# Patient Record
Sex: FEMALE | Race: BLACK OR AFRICAN AMERICAN | Employment: FULL TIME | RURAL
[De-identification: names, ages, dates, MRNs, and addresses within clinical notes are randomized per-mention and may not be internally consistent; named-entity substitution may affect disease eponyms.]

---

## 2017-01-11 ENCOUNTER — OFFICE VISIT (OUTPATIENT)
Dept: FAMILY MEDICINE CLINIC | Age: 37
End: 2017-01-11

## 2017-01-11 VITALS
BODY MASS INDEX: 21.66 KG/M2 | HEART RATE: 109 BPM | HEIGHT: 66 IN | RESPIRATION RATE: 16 BRPM | DIASTOLIC BLOOD PRESSURE: 84 MMHG | OXYGEN SATURATION: 96 % | WEIGHT: 134.8 LBS | TEMPERATURE: 98.4 F | SYSTOLIC BLOOD PRESSURE: 116 MMHG

## 2017-01-11 DIAGNOSIS — E11.69 TYPE 2 DIABETES MELLITUS WITH OTHER SPECIFIED COMPLICATION, WITH LONG-TERM CURRENT USE OF INSULIN (HCC): Primary | ICD-10-CM

## 2017-01-11 DIAGNOSIS — F39 MOOD DISORDER (HCC): ICD-10-CM

## 2017-01-11 DIAGNOSIS — I47.1 PSVT (PAROXYSMAL SUPRAVENTRICULAR TACHYCARDIA) (HCC): ICD-10-CM

## 2017-01-11 DIAGNOSIS — Z79.4 TYPE 2 DIABETES MELLITUS WITH OTHER SPECIFIED COMPLICATION, WITH LONG-TERM CURRENT USE OF INSULIN (HCC): Primary | ICD-10-CM

## 2017-01-11 DIAGNOSIS — Z72.0 TOBACCO ABUSE: ICD-10-CM

## 2017-01-11 RX ORDER — METOPROLOL SUCCINATE 25 MG/1
25 TABLET, EXTENDED RELEASE ORAL DAILY
Qty: 30 TAB | Refills: 2 | Status: ON HOLD | OUTPATIENT
Start: 2017-01-11 | End: 2020-11-16 | Stop reason: SDUPTHER

## 2017-01-11 RX ORDER — INSULIN GLARGINE 100 [IU]/ML
15 INJECTION, SOLUTION SUBCUTANEOUS DAILY
Qty: 3 VIAL | Refills: 2 | Status: SHIPPED | OUTPATIENT
Start: 2017-01-11 | End: 2021-03-09 | Stop reason: ALTCHOICE

## 2017-01-11 RX ORDER — CLONAZEPAM 1 MG/1
1 TABLET ORAL 3 TIMES DAILY
Qty: 45 TAB | Refills: 0 | Status: SHIPPED | OUTPATIENT
Start: 2017-01-11 | End: 2017-01-26

## 2017-01-11 RX ORDER — QUETIAPINE FUMARATE 50 MG/1
50 TABLET, FILM COATED ORAL 2 TIMES DAILY
Qty: 60 TAB | Refills: 2 | Status: SHIPPED | OUTPATIENT
Start: 2017-01-11 | End: 2017-01-19 | Stop reason: SDUPTHER

## 2017-01-11 NOTE — PROGRESS NOTES
Chief Complaint   Patient presents with    Medication Refill     Body mass index is 21.76 kg/(m^2). Reviewed record in preparation for visit and have necessary documentation  Pt did not bring medication to office visit for review  Information was given to pt on Advanced Directives, Living Will  Information was given on Shingles Vaccine  Opportunity was given for questions  Goals that were addressed and/or need to be completed after this appointment include:     Health Maintenance Due   Topic Date Due    Pneumococcal 19-64 Medium Risk (1 of 1 - PPSV23) 07/26/1999    DTaP/Tdap/Td series (1 - Tdap) 07/26/2001    PAP AKA CERVICAL CYTOLOGY  07/26/2001    EYE EXAM RETINAL OR DILATED Q1  06/03/2014     - check for functional glucose monitor and record keeping system: Yes, checks sugar three times daily  Pt was given BS record log to document home readings and return to office for review  Diabetic Bundle:  LDL-76  A1C-11.6  BP-116/84  Smoking? Yes  Anticoagulation medication? No   Eye exam dilated?  Due  Foot exam? Good

## 2017-01-11 NOTE — MR AVS SNAPSHOT
Visit Information Date & Time Provider Department Dept. Phone Encounter #  
 1/11/2017  2:40 PM Alvina Lorenzana MD [address] [id number] Follow-up Instructions Return in about 2 weeks (around 1/25/2017). Upcoming Health Maintenance Date Due Pneumococcal 19-64 Medium Risk (1 of 1 - PPSV23) 7/26/1999 DTaP/Tdap/Td series (1 - Tdap) 7/26/2001 PAP AKA CERVICAL CYTOLOGY 7/26/2001 EYE EXAM RETINAL OR DILATED Q1 6/3/2014 HEMOGLOBIN A1C Q6M 5/7/2017 FOOT EXAM Q1 11/7/2017 MICROALBUMIN Q1 11/7/2017 LIPID PANEL Q1 11/7/2017 Allergies as of 1/11/2017  Review Complete On: 1/11/2017 By: Aliyah Cadet LPN No Known Allergies Current Immunizations  Never Reviewed Name Date Influenza Vaccine (Quad) PF 11/7/2016 Influenza Vaccine PF 1/15/2014 Not reviewed this visit You Were Diagnosed With   
  
 Codes Comments Type 2 diabetes mellitus with other specified complication, with long-term current use of insulin (HCC)    -  Primary ICD-10-CM: E11.69, Z79.4 PSVT (paroxysmal supraventricular tachycardia) (HCC)     ICD-10-CM: I47.1 ICD-9-CM: 427.0 [facility] disorder ([facility]     ICD-10-CM: F39 
ICD-9-CM: 296.90 Vitals BP Pulse Temp Resp Height(growth percentile) Weight(growth percentile) 116/84 (BP 1 Location: Right arm, BP Patient Position: Sitting) (!) 109 98.4 °F (36.9 °C) (Oral) 16 5' 6\" (1.676 m) 134 lb 12.8 oz (61.1 kg) LMP SpO2 BMI OB Status Smoking Status 12/10/2012 96% 21.76 kg/m2 Hysterectomy Current Every Day Smoker Vitals History BMI and BSA Data Body Mass Index Body Surface Area 21.76 kg/m 2 1.69 m 2 Preferred Pharmacy Pharmacy Name Phone [address] [place], [place] - [address] [phone] Your Updated Medication List  
  
   
This list is accurate as of: 1/11/17  3:00 PM.  Always use your most recent med list.  
  
  
  
  
 Blood-Glucose Meter monitoring kit Commonly known as:  [address]  
by Does Not Apply route. Use once daily at different times of day  
  
 citalopram 10 mg tablet Commonly known as:  Alireza Gola Take 1 Tab by mouth daily. clonazePAM 1 mg tablet Commonly known as:  Alexa Sables Take 1 Tab by mouth three (3) times daily for 15 days. Max Daily Amount: 3 mg. Indications: SARA ASSOCIATED WITH BIPOLAR DISORDER, ADJUNCT FLAGYL 500 mg tablet Generic drug:  metroNIDAZOLE Take 500 mg by mouth two (2) times a day. glucose blood VI test strips strip Commonly known as:  ASCENSIA AUTODISC VI, ONE TOUCH ULTRA TEST VI  
by Does Not Apply route two (2) times a day. Bid  
  
 insulin glargine 100 unit/mL injection Commonly known as:  LANTUS  
15 Units by SubCUTAneous route daily. Lancets Misc E11.9 testing once a day  
  
 linaclotide 145 mcg Cap capsule Commonly known as:  Weaver Peterson Take 1 Cap by mouth daily. lisinopril-hydroCHLOROthiazide 20-25 mg per tablet Commonly known as:  Peterson Spark Take  by mouth daily. metFORMIN 1,000 mg tablet Commonly known as:  GLUCOPHAGE Take 1,000 mg by mouth two (2) times daily (with meals). metoprolol succinate 25 mg XL tablet Commonly known as:  TOPROL-XL Take 1 Tab by mouth daily. Indications: PAROXYSMAL SUPRAVENTRICULAR TACHYCARDIA QUEtiapine 50 mg tablet Commonly known as:  SEROquel Take 1 Tab by mouth two (2) times a day for 30 days. Indications: Sara associated with Bipolar Disorder Prescriptions Printed Refills  
 clonazePAM (KLONOPIN) 1 mg tablet 0 Sig: Take 1 Tab by mouth three (3) times daily for 15 days. Max Daily Amount: 3 mg. Indications: SARA ASSOCIATED WITH BIPOLAR DISORDER, ADJUNCT Class: Print Route: Oral  
  
Prescriptions Sent to Pharmacy  Refills  
 insulin glargine (LANTUS) 100 unit/mL injection 2  
 Sig: 15 Units by SubCUTAneous route daily. Class: Normal  
 Pharmacy: [address] [place] [address] #: [phone] Route: SubCUTAneous  
 metoprolol succinate (TOPROL-XL) 25 mg XL tablet 2 Sig: Take 1 Tab by mouth daily. Indications: PAROXYSMAL SUPRAVENTRICULAR TACHYCARDIA Class: Normal  
 Pharmacy: [address] [place] [address] #: [phone] Route: Oral  
 QUEtiapine (SEROQUEL) 50 mg tablet 2 Sig: Take 1 Tab by mouth two (2) times a day for 30 days. Indications: Jolene associated with Bipolar Disorder Class: Normal  
 Pharmacy: [address] [place] [address] #: [phone] Route: Oral  
  
We Performed the Following REFERRAL TO OPHTHALMOLOGY [REF57 Custom] Comments:  
 Patient with DM2 in need of eye exam.. Follow-up Instructions Return in about 2 weeks (around 1/25/2017). Referral Information Referral ID Referred By Referred To  
  
 [id number] Seema FLORES Not Available Visits Status Start Date End Date 1 New Request 1/11/17 1/11/18 If your referral has a status of pending review or denied, additional information will be sent to support the outcome of this decision. Introducing [facility]! Romayne Duster introduces InfoScout patient portal. Now you can access parts of your medical record, email your doctor's office, and request medication refills online. 1. In your internet browser, go to [email or web address] 2. Click on the First Time User? Click Here link in the Sign In box. You will see the New Member Sign Up page. 3. Enter your InfoScout Access Code exactly as it appears below. You will not need to use this code after youve completed the sign-up process. If you do not sign up before the expiration date, you must request a new code. · Info[employer]out Access Code: [email or web address] Expires: 2/5/2017 10:20 AM 
 
 4. Enter the last four digits of your Social Security Number (xxxx) and Date of Birth (mm/dd/yyyy) as indicated and click Submit. You will be taken to the next sign-up page. 5. Create a Joox ID. This will be your Joox login ID and cannot be changed, so think of one that is secure and easy to remember. 6. Create a Joox password. You can change your password at any time. 7. Enter your Password Reset Question and Answer. This can be used at a later time if you forget your password. 8. Enter your e-mail address. You will receive e-mail notification when new information is available in [address] 9. Click Sign Up. You can now view and download portions of your medical record. 10. Click the Download Summary menu link to download a portable copy of your medical information. If you have questions, please visit the Frequently Asked Questions section of the Joox website. Remember, Joox is NOT to be used for urgent needs. For medical emergencies, dial 911. Now available from your iPhone and Android! Please provide this summary of care documentation to your next provider. Your primary care clinician is listed as [address] [place] [address]. If you have any questions after today's visit, please call [phone].

## 2017-01-16 NOTE — PROGRESS NOTES
Patient: Miguel Ellison MRN: [id number]  SSN: [id number]    YOB: 1980  Age: 39 y.o. Sex: female        Subjective:     Chief Complaint   Patient presents with    Medication Refill       HPI: she is a 39y.o. year old female who presents for follow up. Patient with hx of uncontrolled DM2, HTN, HLD, and tobacco abuse. Patient with recent drug screen positive for opioid and cannabis. She is requesting refill of clonazepam prescribed by another provider for anxiety. Patient denies F/C, HA, dizziness, SOB, CP, abdominal pain, dysuria, myalgias or arthralgias. Importance of compliance with all prescribed medications, diabetic diet and follow up discussed. Patient sans other complaints or concerns at this time. Explained that she needed follow up with prescribing provider as I would not write current amounts of clonazepam except for today. Particularly given patient's hx of drug abuse. BP Readings from Last 3 Encounters:   01/11/17 116/84   12/27/16 128/83   11/29/16 136/82     Wt Readings from Last 3 Encounters:   01/11/17 134 lb 12.8 oz (61.1 kg)   12/27/16 126 lb (57.2 kg)   11/29/16 123 lb (55.8 kg)     Body mass index is 21.76 kg/(m^2).       Lab Results   Component Value Date/Time    WBC 5.8 11/07/2016 12:22 PM    Hemoglobin (POC) 12.5 10/04/2013 04:33 PM    HGB 15.0 11/07/2016 12:22 PM    HCT 45.3 11/07/2016 12:22 PM    PLATELET 368 27/88/7482 12:22 PM    MCV 88 11/07/2016 12:22 PM       Lab Results   Component Value Date/Time    Cholesterol, total 143 11/07/2016 12:22 PM    HDL Cholesterol 45 11/07/2016 12:22 PM    LDL, calculated 76 11/07/2016 12:22 PM    Triglyceride 108 11/07/2016 12:22 PM       Lab Results   Component Value Date/Time    Sodium 132 11/07/2016 12:22 PM    Potassium 4.1 11/07/2016 12:22 PM    Chloride 88 11/07/2016 12:22 PM    CO2 24 11/07/2016 12:22 PM    Glucose 226 11/07/2016 12:22 PM    BUN 11 11/07/2016 12:22 PM    Creatinine 0.65 11/07/2016 12:22 PM    BUN/Creatinine ratio 17 11/07/2016 12:22 PM    GFR est  11/07/2016 12:22 PM    GFR est non- 11/07/2016 12:22 PM    Calcium 10.3 11/07/2016 12:22 PM    Bilirubin, total 0.3 11/07/2016 12:22 PM    ALT 11 11/07/2016 12:22 PM    AST 15 11/07/2016 12:22 PM    Alk. phosphatase 87 11/07/2016 12:22 PM    Protein, total 7.7 11/07/2016 12:22 PM    Albumin 4.6 11/07/2016 12:22 PM    A-G Ratio 1.5 11/07/2016 12:22 PM      Lab Results   Component Value Date/Time    Hemoglobin A1c 11.6 11/07/2016 12:22 PM    Hemoglobin A1c (POC) 5.5 08/07/2013 01:47 PM          Encounter Diagnoses   Name Primary?  Type 2 diabetes mellitus with other specified complication, with long-term current use of insulin (HCC) Yes    PSVT ([facility]oxysmal supraventricular tachycardia) [facility]     Mood disorder ([facility])     Tobacco abuse        Current and past medical information:    Current Medications after this visit[de-identified]     Current Outpatient Prescriptions   Medication Sig    insulin glargine (LANTUS) 100 unit/mL injection 15 Units by SubCUTAneous route daily.  metoprolol succinate (TOPROL-XL) 25 mg XL tablet Take 1 Tab by mouth daily. Indications: PAROXYSMAL SUPRAVENTRICULAR TACHYCARDIA    QUEtiapine (SEROQUEL) 50 mg tablet Take 1 Tab by mouth two (2) times a day for 30 days. Indications: Sara associated with Bipolar Disorder    clonazePAM (KLONOPIN) 1 mg tablet Take 1 Tab by mouth three (3) times daily for 15 days. Max Daily Amount: 3 mg. Indications: SARA ASSOCIATED WITH BIPOLAR DISORDER, ADJUNCT    glucose blood VI test strips (ASCENSIA AUTODISC VI, ONE TOUCH ULTRA TEST VI) strip by Does Not Apply route two (2) times a day. Bid    lisinopril-hydroCHLOROthiazide (PRINZIDE, ZESTORETIC) 20-25 mg per tablet Take  by mouth daily.  linaclotide (LINZESS) 145 mcg cap capsule Take 1 Cap by mouth daily.  metFORMIN (GLUCOPHAGE) 1,000 mg tablet Take 1,000 mg by mouth two (2) times daily (with meals).     Lancets misc E11.9 testing once a day    citalopram (CELEXA) 10 mg tablet Take 1 Tab by mouth daily.  Blood-Glucose Meter (ONE TOUCH [employer] SYSTEM) monitoring kit by Does Not Apply route. Use once daily at different times of day    metroNIDAZOLE (FLAGYL) 500 mg tablet Take 500 mg by mouth two (2) times a day. No current facility-administered medications for this visit.         Patient Active Problem List    Diagnosis Date Noted    H/O medication noncompliance 11/23/2016    Toenail fungus 08/07/2013    Emotional lability 05/31/2013    Hyperlipidemia 04/29/2013    Anxiety 04/29/2013    Anemia 03/18/2011    Tobacco abuse 06/21/2010    HTN (hypertension), benign 05/19/2010    Diabetes mellitus ([facility] 05/19/2010    Cholesterol serum increased 05/19/2010    Menorrhagia, premenopausal 05/19/2010       Past Medical History   Diagnosis Date    Cholesterol serum increased 5/19/2010    Diabetes mellitus ([facility] 5/19/2010    HTN (hypertension), benign 5/19/2010       No Known Allergies    Past Surgical History   Procedure Laterality Date    Hx gyn       s/p Hysterectomy       Social History     Social History    Marital status: SINGLE     Spouse name: N/A    Number of children: N/A    Years of education: N/A     Social History Main Topics    Smoking status: Current Every Day Smoker     Packs/day: 1.00    Smokeless tobacco: Never Used    Alcohol use No    Drug use: No    Sexual activity: Yes     Other Topics Concern    None     Social History Narrative         Objective:     Review of Systems:  Constitutional: Negative for fatigue or malaise  Derm: Negative for rash or lesion  HEENT: Negative for acute hearing or vision changes  Cardiovascular: Negative for dizziness, chest pain or palpitations  Respiratory: Negative for cough, wheezing or SOB  Gastreintestinal: Negative for nausea or abdominal pain  Genital/urinary: Negative for dysuria or voiding dysfunction  Muscoloskeletal: Negative for acute myalgias or arthralgias   Neurological: Negative for headache, weakness or paresthesia  Psychological: Negative for depression or anxiety      Vitals:    01/11/17 1438   BP: 116/84   Pulse: (!) 109   Resp: 16   Temp: 98.4 °F (36.9 °C)   TempSrc: Oral   SpO2: 96%   Weight: 134 lb 12.8 oz (61.1 kg)   Height: 5' 6\" (1.676 m)      Body mass index is 21.76 kg/(m^2). Physical Exam:  Constitutional: well developed, well nourished, in no acute distress  Skin: warm and dry, normal tone and turgor  Head: normocephalic, atraumatic  Eyes: sclera clear, EOMI, PERRL  Neck: normal range of motion  Cardiovascular: normal S1, S2, regular rate and rhythm  Respiratory: clear to auscultation bilaterally with symmetrical effort  Abdomen: soft, BS normal  Extremities: full range of motion  Neurology: normal strength and sensation  Psych: active, alert and oriented, affect appropriate       Health Maintenance Due   Topic Date Due    Pneumococcal 19-64 Medium Risk (1 of 1 - PPSV23) 07/26/1999    DTaP/Tdap/Td series (1 - Tdap) 07/26/2001    PAP AKA CERVICAL CYTOLOGY  07/26/2001    EYE EXAM RETINAL OR DILATED Q1  06/03/2014       Risk, benefits and potential costs of recommended health maintenance discussed. Patient expressed understanding and deferred at this time. Assessment and orders:       ICD-10-CM ICD-9-CM    1. Type 2 diabetes mellitus with other specified complication, with long-term current use of insulin ([facility]) E11.69  insulin glargine (LANTUS) 100 unit/mL injection    Z79.4  REFERRAL TO OPHTHALMOLOGY   2. PSVT (paroxysmal supraventricular tachycardia) ([facility]) I47.1 427.0 metoprolol succinate (TOPROL-XL) 25 mg XL tablet   3. Mood disorder ([facility]) F39 296.90 QUEtiapine (SEROQUEL) 50 mg tablet      clonazePAM (KLONOPIN) 1 mg tablet   4. Tobacco abuse Z72.0 305.1          Plan of care:    Available hospital records reviewed. Diagnoses were discussed in detail with patient. Medication risks/benefits/side effects discussed with patient.    All of the patient's questions were addressed. The patient understands and agrees with our plan of care. The patient knows to call back if they are unsure of or forgets any changes we discussed today or if the symptoms change. The patient received an After-Visit Summary which contains VS, diagnoses, orders, allergy and medication lists. Patient Care Team:  Gio De Los Santos NP as PCP - General (Nurse Practitioner)  Veronica Petty MD (Ophthalmology)    Follow-up Disposition:  Return in about 2 weeks (around 1/25/2017).     Future Appointments  Date Time Provider Lucila De Oliveira   1/25/2017 1:25 PM Bee Cortes NP [facility] SCHED       Signed By: Reynaldo Wade MD     January 15, 2017

## 2017-01-18 ENCOUNTER — PATIENT OUTREACH (OUTPATIENT)
Dept: FAMILY MEDICINE CLINIC | Age: 37
End: 2017-01-18

## 2017-01-18 NOTE — PROGRESS NOTES
[address] 11/24-11/26/2016 DKA, Pseudohyponatremia, Acute kidney injury, Trichomonas    This episode closed. Patient attended follow up appointment and had no readmissions during 30 day PALMER.

## 2017-01-19 ENCOUNTER — TELEPHONE (OUTPATIENT)
Dept: FAMILY MEDICINE CLINIC | Age: 37
End: 2017-01-19

## 2017-01-19 DIAGNOSIS — F39 MOOD DISORDER (HCC): ICD-10-CM

## 2017-01-19 DIAGNOSIS — R45.86 EMOTIONAL LABILITY: ICD-10-CM

## 2017-01-19 DIAGNOSIS — G47.00 INSOMNIA, UNSPECIFIED TYPE: ICD-10-CM

## 2017-01-19 RX ORDER — QUETIAPINE FUMARATE 50 MG/1
50 TABLET, FILM COATED ORAL 2 TIMES DAILY
Qty: 15 TAB | Refills: 0 | Status: SHIPPED | OUTPATIENT
Start: 2017-01-19 | End: 2017-01-25 | Stop reason: ALTCHOICE

## 2017-01-19 NOTE — TELEPHONE ENCOUNTER
Pt is calling back about her anxiety. It is really high. She took the medication as described by Bee. But it did not last as long as suppose to. She does not believe she can go another 8 hours with the anxiety as high as it is. She had some kind of \"blow up\" last night. Her next appointment is scheduled for Wednesday, January 25, 2017 01:25 PM. Per Dr. Gonzalo Bartlett: He is sending medication to last her to her next appointment only. She is to take as directed.

## 2017-01-19 NOTE — TELEPHONE ENCOUNTER
Patient called about her prescription for Seroquel. [facility] is stating that it is a week to early to fill this prescription. Patient states that she is out and wants to know what to do about this. [facility] stated that the dose is the same as what has been prescribed previously.   Please contact the patient to discuss ASAP at [phone]

## 2017-01-19 NOTE — TELEPHONE ENCOUNTER
Call placed to [facility] regarding filling seroquel early.  Per Dr. Sarah Valentin refill sent for 15 tablets to last patient until next appointment

## 2017-01-23 DIAGNOSIS — F39 MOOD DISORDER (HCC): ICD-10-CM

## 2017-01-23 RX ORDER — CLONAZEPAM 1 MG/1
1 TABLET ORAL 3 TIMES DAILY
Refills: 0 | OUTPATIENT
Start: 2017-01-23 | End: 2017-02-07

## 2017-01-24 NOTE — TELEPHONE ENCOUNTER
Patient request for refill of seroquel. Using more than prescribed dosing and has run out early. Has follow up on 1/25/17.

## 2017-01-25 ENCOUNTER — OFFICE VISIT (OUTPATIENT)
Dept: FAMILY MEDICINE CLINIC | Age: 37
End: 2017-01-25

## 2017-01-25 VITALS
WEIGHT: 131 LBS | DIASTOLIC BLOOD PRESSURE: 102 MMHG | OXYGEN SATURATION: 99 % | BODY MASS INDEX: 21.05 KG/M2 | SYSTOLIC BLOOD PRESSURE: 149 MMHG | RESPIRATION RATE: 16 BRPM | HEIGHT: 66 IN | HEART RATE: 103 BPM | TEMPERATURE: 98.1 F

## 2017-01-25 DIAGNOSIS — E55.9 VITAMIN D DEFICIENCY: ICD-10-CM

## 2017-01-25 DIAGNOSIS — G47.00 INSOMNIA, UNSPECIFIED TYPE: ICD-10-CM

## 2017-01-25 DIAGNOSIS — I47.1 PSVT (PAROXYSMAL SUPRAVENTRICULAR TACHYCARDIA) (HCC): ICD-10-CM

## 2017-01-25 DIAGNOSIS — Z71.3 DIETARY COUNSELING AND SURVEILLANCE: ICD-10-CM

## 2017-01-25 DIAGNOSIS — Z79.4 TYPE 2 DIABETES MELLITUS WITH OTHER SPECIFIED COMPLICATION, WITH LONG-TERM CURRENT USE OF INSULIN (HCC): ICD-10-CM

## 2017-01-25 DIAGNOSIS — Z71.82 EXERCISE COUNSELING: ICD-10-CM

## 2017-01-25 DIAGNOSIS — E78.1 PURE HYPERGLYCERIDEMIA: ICD-10-CM

## 2017-01-25 DIAGNOSIS — F39 MOOD DISORDER (HCC): Primary | ICD-10-CM

## 2017-01-25 DIAGNOSIS — Z72.0 TOBACCO ABUSE: ICD-10-CM

## 2017-01-25 DIAGNOSIS — E11.69 TYPE 2 DIABETES MELLITUS WITH OTHER SPECIFIED COMPLICATION, WITH LONG-TERM CURRENT USE OF INSULIN (HCC): ICD-10-CM

## 2017-01-25 RX ORDER — TRAZODONE HYDROCHLORIDE 100 MG/1
100 TABLET ORAL
Qty: 30 TAB | Refills: 0 | Status: SHIPPED | OUTPATIENT
Start: 2017-01-25 | End: 2017-02-06 | Stop reason: SDUPTHER

## 2017-01-25 RX ORDER — DIVALPROEX SODIUM 500 MG/1
500 TABLET, DELAYED RELEASE ORAL 2 TIMES DAILY
Qty: 60 TAB | Refills: 0 | Status: SHIPPED | OUTPATIENT
Start: 2017-01-25 | End: 2017-02-24

## 2017-01-25 NOTE — MR AVS SNAPSHOT
Visit Information Date & Time Provider Department Dept. Phone Encounter #  
 1/25/2017  1:25 PM Shellie Ch,  [facility] [phone] [id number] Follow-up Instructions Return in about 2 weeks (around 2/8/2017), or if symptoms worsen or fail to improve. Upcoming Health Maintenance Date Due Pneumococcal 19-64 Medium Risk (1 of 1 - PPSV23) 7/26/1999 DTaP/Tdap/Td series (1 - Tdap) 7/26/2001 PAP AKA CERVICAL CYTOLOGY 7/26/2001 EYE EXAM RETINAL OR DILATED Q1 6/3/2014 HEMOGLOBIN A1C Q6M 5/7/2017 FOOT EXAM Q1 11/7/2017 MICROALBUMIN Q1 11/7/2017 LIPID PANEL Q1 11/7/2017 Allergies as of 1/25/2017  Review Complete On: 1/25/2017 By: Carole Thomson LPN No Known Allergies Current Immunizations  Never Reviewed Name Date Influenza Vaccine (Quad) PF 11/7/2016 Influenza Vaccine PF 1/15/2014 Not reviewed this visit You Were Diagnosed With   
  
 Codes Comments Mood disorder ([facility]    -  Primary ICD-10-CM: F39 
ICD-9-CM: 296.90 Insomnia, unspecified type     ICD-10-CM: G47.00 ICD-9-CM: 780.52 Type 2 diabetes mellitus with other specified complication, with long-term current use of insulin (HCC)     ICD-10-CM: E11.69, Z79.4 PSVT (paroxysmal supraventricular tachycardia) ([facility])     ICD-10-CM: I47.1 ICD-9-CM: 427.0 Tobacco abuse     ICD-10-CM: Z72.0 ICD-9-CM: 305.1 Uncontrolled type 2 diabetes mellitus without complication, without long-term current use of insulin ([facility]     ICD-10-CM: E11.65 ICD-9-CM: 250.02   
 Pure hyperglyceridemia     ICD-10-CM: E78.1 ICD-9-CM: 272.1 Vitamin D deficiency     ICD-10-CM: E55.9 ICD-9-CM: 268.9 Exercise counseling     ICD-10-CM: Z71.89 ICD-9-CM: V65.41 Dietary counseling and surveillance     ICD-10-CM: Z71.3 ICD-9-CM: V65.3  Type 2 diabetes mellitus with ketoacidosis without coma, with long-term current use of insulin (HCC)     ICD-10-CM: E13.10, Z79.4 ICD-9-CM: 250.10, V58.67 Vitals BP Pulse Temp Resp Height(growth percentile) Weight(growth percentile) (!) 149/102 (BP 1 Location: Right arm, BP Patient Position: Sitting) (!) 103 98.1 °F (36.7 °C) (Oral) 16 5' 6\" (1.676 m) 131 lb (59.4 kg) LMP SpO2 BMI OB Status Smoking Status 12/10/2012 99% 21.14 kg/m2 Hysterectomy Current Every Day Smoker Vitals History BMI and BSA Data Body Mass Index Body Surface Area  
 21.14 kg/m 2 1.66 m 2 Preferred Pharmacy Pharmacy Name Phone [address] [address] [facility] [phone] Your Updated Medication List  
  
   
This list is accurate as of: 1/25/17  2:14 PM.  Always use your most recent med list.  
  
  
  
  
 Blood-Glucose Meter monitoring kit Commonly known as:  [address]  
by Does Not Apply route. Use once daily at different times of day  
  
 clonazePAM 1 mg tablet Commonly known as:  Lova Robe Take 1 Tab by mouth three (3) times daily for 15 days. Max Daily Amount: 3 mg. Indications: SARA ASSOCIATED WITH BIPOLAR DISORDER, ADJUNCT  
  
 divalproex  mg tablet Commonly known as:  DEPAKOTE Take 1 Tab by mouth two (2) times a day for 30 days. Indications: Sara associated with Bipolar Disorder  
  
 glucose blood VI test strips strip Commonly known as:  ASCENSIA AUTODISC VI, ONE TOUCH ULTRA TEST VI  
by Does Not Apply route two (2) times a day. Bid  
  
 insulin glargine 100 unit/mL injection Commonly known as:  LANTUS  
15 Units by SubCUTAneous route daily. Insulin Syringes (Disposable) 1 mL Syrg 2 Syringes by Does Not Apply route daily for 30 days. Lancets Misc E11.9 testing once a day  
  
 lisinopril-hydroCHLOROthiazide 20-25 mg per tablet Commonly known as:  Grace Schilder Take  by mouth daily. metFORMIN 1,000 mg tablet Commonly known as:  GLUCOPHAGE  
 Take 1,000 mg by mouth two (2) times daily (with meals). metoprolol succinate 25 mg XL tablet Commonly known as:  TOPROL-XL Take 1 Tab by mouth daily. Indications: PAROXYSMAL SUPRAVENTRICULAR TACHYCARDIA  
  
 traZODone 100 mg tablet Commonly known as:  Henry Croniner Take 1 Tab by mouth nightly for 30 days. Indications: insomnia associated with depression Prescriptions Sent to Pharmacy Refills  
 divalproex DR (DEPAKOTE) 500 mg tablet 0 Sig: Take 1 Tab by mouth two (2) times a day for 30 days. Indications: Jolene associated with Bipolar Disorder Class: Normal  
 Pharmacy: [address] [place] [address] #: [phone] Route: Oral  
 glucose blood VI test strips (ASCENSIA AUTODISC VI, ONE TOUCH ULTRA TEST VI) strip 3 Sig: by Does Not Apply route two (2) times a day. Bid Class: Normal  
 Pharmacy: [address] [place] [address] #: [phone] Route: Does Not Apply Insulin Syringes, Disposable, 1 mL syrg 2 Si Syringes by Does Not Apply route daily for 30 days. Class: Normal  
 Pharmacy: [address] [place] [address] #: [phone] Route: Does Not Apply  
 traZODone (DESYREL) 100 mg tablet 0 Sig: Take 1 Tab by mouth nightly for 30 days. Indications: insomnia associated with depression Class: Normal  
 Pharmacy: [address] [place] [address] #: [phone] Route: Oral  
  
We Performed the Following [id number] 9+OXYCODONE+CRT-UNBUND [74860 CPT(R)] Follow-up Instructions Return in about 2 weeks (around 2017), or if symptoms worsen or fail to improve. Patient Instructions A Healthy Lifestyle: Care Instructions Your Care Instructions A healthy lifestyle can help you feel good, stay at a healthy weight, and have plenty of energy for both work and play.  A healthy lifestyle is something you can share with your whole family. A healthy lifestyle also can lower your risk for serious health problems, such as high blood pressure, heart disease, and diabetes. You can follow a few steps listed below to improve your health and the health of your family. Follow-up care is a key part of your treatment and safety. Be sure to make and go to all appointments, and call your doctor if you are having problems. Its also a good idea to know your test results and keep a list of the medicines you take. How can you care for yourself at home? · Do not eat too much sugar, fat, or fast foods. You can still have dessert and treats now and then. The goal is moderation. · Start small to improve your eating habits. Pay attention to portion sizes, drink less juice and soda pop, and eat more fruits and vegetables. ¨ Eat a healthy amount of food. A 3-ounce serving of meat, for example, is about the size of a deck of cards. Fill the rest of your plate with vegetables and whole grains. ¨ Limit the amount of soda and sports drinks you have every day. Drink more water when you are thirsty. ¨ Eat at least 5 servings of fruits and vegetables every day. It may seem like a lot, but it is not hard to reach this goal. A serving or helping is 1 piece of fruit, 1 cup of vegetables, or 2 cups of leafy, raw vegetables. Have an apple or some carrot sticks as an afternoon snack instead of a candy bar. Try to have fruits and/or vegetables at every meal. 
· Make exercise part of your daily routine. You may want to start with simple activities, such as walking, bicycling, or slow swimming. Try to be active 30 to 60 minutes every day. You do not need to do all 30 to 60 minutes all at once. For example, you can exercise 3 times a day for 10 or 20 minutes.  Moderate exercise is safe for most people, but it is always a good idea to talk to your doctor before starting an exercise program. 
 · Keep moving. Krystal Restrepoo the lawn, work in the garden, or Soundstache. Take the stairs instead of the elevator at work. · If you smoke, quit. People who smoke have an increased risk for heart attack, stroke, cancer, and other lung illnesses. Quitting is hard, but there are ways to boost your chance of quitting tobacco for good. ¨ Use nicotine gum, patches, or lozenges. ¨ Ask your doctor about stop-smoking programs and medicines. ¨ Keep trying. In addition to reducing your risk of diseases in the future, you will notice some benefits soon after you stop using tobacco. If you have shortness of breath or asthma symptoms, they will likely get better within a few weeks after you quit. · Limit how much alcohol you drink. Moderate amounts of alcohol (up to 2 drinks a day for men, 1 drink a day for women) are okay. But drinking too much can lead to liver problems, high blood pressure, and other health problems. Family health If you have a family, there are many things you can do together to improve your health. · Eat meals together as a family as often as possible. · Eat healthy foods. This includes fruits, vegetables, lean meats and dairy, and whole grains. · Include your family in your fitness plan. Most people think of activities such as jogging or tennis as the way to fitness, but there are many ways you and your family can be more active. Anything that makes you breathe hard and gets your heart pumping is exercise. Here are some tips: 
¨ Walk to do errands or to take your child to school or the bus. ¨ Go for a family bike ride after dinner instead of watching TV. Where can you learn more? Go to http://stewart-delaney.info/. Enter X537 in the search box to learn more about \"A Healthy Lifestyle: Care Instructions. \" Current as of: July 26, 2016 Content Version: 11.1 © 3150-8159 [employer].  Care instructions adapted under license by [address] (which disclaims liability or warranty for this information). If you have questions about a medical condition or this instruction, always ask your healthcare professional. [address] any warranty or liability for your use of this information. Introducing \[facility] [place] [facility]\"" & HEALTH SERVICES! Manohar Sifuentes introduces [employer] patient portal. Now you can access parts of your medical record, email your doctor's office, and request medication refills online. 1. In your internet browser, go to [email or web address] 2. Click on the First Time User? Click Here link in the Sign In box. You will see the New Member Sign Up page. 3. Enter your [employer] Access Code exactly as it appears below. You will not need to use this code after youve completed the sign-up process. If you do not sign up before the expiration date, you must request a new code. · [employer] Access Code: [email or web address] Expires: 2/5/2017 10:20 AM 
 
4. Enter the last four digits of your Social Security Number (xxxx) and Date of Birth (mm/dd/yyyy) as indicated and click Submit. You will be taken to the next sign-up page. 5. Create a [employer] ID. This will be your [employer] login ID and cannot be changed, so think of one that is secure and easy to remember. 6. Create a [employer] password. You can change your password at any time. 7. Enter your Password Reset Question and Answer. This can be used at a later time if you forget your password. 8. Enter your e-mail address. You will receive e-mail notification when new information is available in [address] 9. Click Sign Up. You can now view and download portions of your medical record. 10. Click the Download Summary menu link to download a portable copy of your medical information. If you have questions, please visit the Frequently Asked Questions section of the [employer] website.  Remember, [employer] is NOT to be used for urgent needs. For medical emergencies, dial 911. Now available from your iPhone and Android! Please provide this summary of care documentation to your next provider. Your primary care clinician is listed as [address] [place] [address]. If you have any questions after today's visit, please call [phone].

## 2017-01-25 NOTE — PATIENT INSTRUCTIONS

## 2017-01-25 NOTE — PROGRESS NOTES
- check for functional glucose monitor and record keeping system-yes  Three times dailyPt was given BS record log to document home readings and return to office for review  Diabetic Bundle:  LDL-76  A1C-11.6  BP-  Smoking? yes  Anticoagulation medication? no  Eye exam dilated? Foot exam?  Reviewed record in preparation for visit and have obtained necessary documentation. Patient did not bring medications to visit for review. Information provided on Advanced Directive, Living Will. Body mass index is 21.14 kg/(m^2).    Health Maintenance Due   Topic Date Due    Pneumococcal 19-64 Medium Risk (1 of 1 - PPSV23) 07/26/1999    DTaP/Tdap/Td series (1 - Tdap) 07/26/2001    PAP AKA CERVICAL CYTOLOGY  07/26/2001    EYE EXAM RETINAL OR DILATED Q1  06/03/2014

## 2017-01-31 LAB
ALPRAZ UR QL: NEGATIVE
AMPHETAMINES UR QL SCN: NEGATIVE NG/ML
BARBITURATES UR QL SCN: NEGATIVE NG/ML
BENZODIAZ UR QL SCN: NORMAL NG/ML
BENZODIAZ UR QL: POSITIVE NG/ML
BZE UR QL SCN: NEGATIVE NG/ML
CANNABINOIDS UR QL CFM: POSITIVE
CANNABINOIDS UR QL SCN: NORMAL NG/ML
CLONAZEPAM UR CFM-MCNC: 566 NG/ML
CLONAZEPAM UR QL: POSITIVE
CREAT UR-MCNC: 230.6 MG/DL (ref 20–300)
FLURAZEPAM UR QL: NEGATIVE
LORAZEPAM UR QL: NEGATIVE
METHADONE UR QL SCN: NEGATIVE NG/ML
MIDAZOLAM UR QL CFM: NEGATIVE
NORDIAZEPAM UR QL: NEGATIVE
OPIATES UR QL SCN: NEGATIVE NG/ML
OXAZEPAM UR QL: NEGATIVE
OXYCODONE+OXYMORPHONE UR QL SCN: NEGATIVE
OXYCODONE+OXYMORPHONE UR QL SCN: NORMAL NG/ML
PCP UR QL: NEGATIVE NG/ML
PH UR: 6 [PH] (ref 4.5–8.9)
PLEASE NOTE:, 733157: NORMAL
PROPOXYPH UR QL SCN: NEGATIVE NG/ML
TEMAZEPAM UR QL CFM: NEGATIVE
THC UR CFM-MCNC: 756 NG/ML
TRIAZOLAM UR QL: NEGATIVE

## 2017-02-01 NOTE — PROGRESS NOTES
Progress Note    Patient: Chi Heaton MRN: [id number]  SSN: [id number]    YOB: 1980  Age: 39 y.o. Sex: female        Chief Complaint   Patient presents with    Diabetes    Medication Refill    Sleep Problem         Subjective:   Cardiovascular Review:  The patient has diabetes, hypertension and hyperlipidemia. Diet and Lifestyle: generally follows a low fat low cholesterol diet, exercises sporadically, smoker 1 ppd, caffeine intake moderate, alcohol intake none  Home BP Monitoring: is well controlled at home, ranging 120's/70's. Pertinent ROS: taking medications as instructed, no medication side effects noted, no TIA's, no chest pain on exertion, no dyspnea on exertion, no swelling of ankles, no orthostatic dizziness or lightheadedness, no orthopnea or paroxysmal nocturnal dyspnea, no palpitations.       Diabetes Mellitus:  She has diabetes mellitus, and hypertension and hyperlipidemia. Diabetic ROS - medication compliance: compliant all of the time, diabetic diet compliance: compliant all of the time, home glucose monitoring: is performed regularly, fasting values range 130. Lab review: most recent lipid panel reviewed, showing LDL result does not yet meet goal, HDL normal, triglycerides normal, liver functions are normal, A1c is >11. Patient states that she is checking her blood sugars twice a day. Has a log with the results. Highest was 190 and lowest was 120. Patient relates that she is watching her diet and attempting to get some exercise. Relates that she was afraid that her glucose was elevated.      Anxiety  Patient complains of evaluation of anxiety disorder, bipolar disorder. She has the following anxiety symptoms: palpitations, racing thoughts, psychomotor agitation, feelings of losing control, difficulty concentrating. Onset of symptoms was approximately 2 weeks ago, gradually worsening since that time. She denies current suicidal and homicidal ideation.  Family history significant for nothing. Possible organic causes contributing are: none. Risk factors: negative life event multiple and previous episode of depression Previous treatment includes Celexa. Previous treatment includes Celexa and no other therapies. She complains of the following side effects from the treatment: none. Patient states that she may have bipolar. Is having severe mood swings with feelings that she has failed. Is having issues with insomnia and states that her brain will not shut off at night.      Encounter Diagnoses   Name Primary?  Mood disorder (HCC) Yes    Insomnia, unspecified type     Type 2 diabetes mellitus with other specified complication, with long-term current use of insulin (HCC)     PSVT (paroxysmal supraventricular tachycardia) (HCC)     Tobacco abuse     Uncontrolled type 2 diabetes mellitus without complication, without long-term current use of insulin (HCC)     Pure hyperglyceridemia     Vitamin D deficiency     Exercise counseling     Dietary counseling and surveillance              Current and past medical information:    Current Medications after this visit[de-identified]   Current Outpatient Prescriptions   Medication Sig    divalproex DR (DEPAKOTE) 500 mg tablet Take 1 Tab by mouth two (2) times a day for 30 days. Indications: Jolene associated with Bipolar Disorder    glucose blood VI test strips (ASCENSIA AUTODISC VI, ONE TOUCH ULTRA TEST VI) strip by Does Not Apply route two (2) times a day. Bid    Insulin Syringes, Disposable, 1 mL syrg 2 Syringes by Does Not Apply route daily for 30 days.  traZODone (DESYREL) 100 mg tablet Take 1 Tab by mouth nightly for 30 days. Indications: insomnia associated with depression    insulin glargine (LANTUS) 100 unit/mL injection 15 Units by SubCUTAneous route daily.  metoprolol succinate (TOPROL-XL) 25 mg XL tablet Take 1 Tab by mouth daily.  Indications: PAROXYSMAL SUPRAVENTRICULAR TACHYCARDIA    lisinopril-hydroCHLOROthiazide (PRINZIDE, ZESTORETIC) 20-25 mg per tablet Take  by mouth daily.  metFORMIN (GLUCOPHAGE) 1,000 mg tablet Take 1,000 mg by mouth two (2) times daily (with meals).  Lancets misc E11.9 testing once a day    Blood-Glucose Meter (ONE TOUCH BASIC SYSTEM) monitoring kit by Does Not Apply route. Use once daily at different times of day     No current facility-administered medications for this visit. Patient Active Problem List    Diagnosis Date Noted    H/O medication noncompliance 11/23/2016    Toenail fungus 08/07/2013    Emotional lability 05/31/2013    Hyperlipidemia 04/29/2013    Anxiety 04/29/2013    Anemia 03/18/2011    Tobacco abuse 06/21/2010    HTN (hypertension), benign 05/19/2010    Diabetes mellitus ([facility] [address] 05/19/2010    Cholesterol serum increased 05/19/2010    Menorrhagia, premenopausal 05/19/2010       Past Medical History   Diagnosis Date    Cholesterol serum increased 5/19/2010    Diabetes mellitus ([facility] [address] 5/19/2010    HTN (hypertension), benign 5/19/2010       No Known Allergies    Past Surgical History   Procedure Laterality Date    Hx gyn       s/p Hysterectomy       Social History     Social History    Marital status: SINGLE     Spouse name: N/A    Number of children: N/A    Years of education: N/A     Social History Main Topics    Smoking status: Current Every Day Smoker     Packs/day: 1.00    Smokeless tobacco: Never Used    Alcohol use No    Drug use: No    Sexual activity: Yes     Other Topics Concern    None     Social History Narrative       Review of Systems   Constitutional: Negative. Negative for chills, diaphoresis, fever, malaise/fatigue and weight loss. HENT: Negative. Negative for congestion and sore throat. Eyes: Negative. Negative for blurred vision, double vision and photophobia. Respiratory: Negative. Negative for cough, hemoptysis, sputum production, shortness of breath and wheezing. Cardiovascular: Negative.   Negative for chest pain, palpitations, orthopnea, claudication, leg swelling and PND. Gastrointestinal: Negative. Negative for abdominal pain, blood in stool, constipation, diarrhea, heartburn, melena, nausea and vomiting. Genitourinary: Negative. Negative for dysuria, flank pain, frequency, hematuria and urgency. Musculoskeletal: Negative. Negative for back pain, falls, joint pain, myalgias and neck pain. Skin: Negative. Negative for itching and rash. Neurological: Negative. Negative for dizziness, tingling, tremors, sensory change, speech change, focal weakness, seizures, loss of consciousness, weakness and headaches. Endo/Heme/Allergies: Negative. Psychiatric/Behavioral: Negative for depression, hallucinations, memory loss, substance abuse and suicidal ideas. The patient is nervous/anxious and has insomnia. Objective:     Vitals:    01/25/17 1311   BP: (!) 149/102   Pulse: (!) 103   Resp: 16   Temp: 98.1 °F (36.7 °C)   TempSrc: Oral   SpO2: 99%   Weight: 131 lb (59.4 kg)   Height: 5' 6\" (1.676 m)      Body mass index is 21.14 kg/(m^2). Physical Exam   Constitutional: She is oriented to person, place, and time and well-developed, well-nourished, and in no distress. No distress. HENT:   Head: Normocephalic and atraumatic. Right Ear: External ear normal.   Left Ear: External ear normal.   Nose: Nose normal.   Mouth/Throat: Oropharynx is clear and moist. No oropharyngeal exudate. Eyes: Conjunctivae and EOM are normal. Pupils are equal, round, and reactive to light. Right eye exhibits no discharge. Left eye exhibits no discharge. Neck: Normal range of motion. Neck supple. No JVD present. No tracheal deviation present. No thyromegaly present. Cardiovascular: Normal rate, regular rhythm, normal heart sounds and intact distal pulses. Exam reveals no gallop and no friction rub. No murmur heard. Pulmonary/Chest: Effort normal and breath sounds normal. No stridor. No respiratory distress. She has no wheezes.  She has no rales. She exhibits no tenderness. Abdominal: Soft. Bowel sounds are normal. She exhibits no distension and no mass. There is no tenderness. There is no rebound and no guarding. Musculoskeletal: Normal range of motion. She exhibits no edema, tenderness or deformity. Lymphadenopathy:     She has no cervical adenopathy. Neurological: She is alert and oriented to person, place, and time. She displays normal reflexes. No cranial nerve deficit. She exhibits normal muscle tone. Gait normal. Coordination normal. GCS score is 15. Skin: Skin is warm and dry. No rash noted. She is not diaphoretic. No erythema. No pallor. Psychiatric: Mood, memory, affect and judgment normal.   Nursing note and vitals reviewed. Health Maintenance Due   Topic Date Due    Pneumococcal 19-64 Medium Risk (1 of 1 - PPSV23) 07/26/1999    DTaP/Tdap/Td series (1 - Tdap) 07/26/2001    PAP AKA CERVICAL CYTOLOGY  07/26/2001    EYE EXAM RETINAL OR DILATED Q1  06/03/2014       Assessment and orders:       ICD-10-CM ICD-9-CM    1. [facility] F39 296.90 [id number]   2. Insomnia, unspecified type G47.00 780.52 Will patient's history, do not feel that any of the hypnotic medications is appropriate for this patient. Will start with trazodone for sleep. traZODone (DESYREL) 100 mg tablet   3. Type 2 diabetes mellitus with other specified complication, with long-term current use of insulin (HCC) E11.69  Patient to check  glucose at least daily. Instructions on foot care discussed with patient with information printed and given to patient for review. Dietary guidelines along with the need to get exercise discussed with patient to help with getting blood sugar down along with the A1c. Patient verbalized understanding. Carbohydrate counting reinforced. Patient to continue to work on compliance with medications and preventive appointments.    Reduce risk of comorbid complications related to diabetes (renal disease, heart disease, amputation, and retinopathy). To decrease or maintain patient's biometrics:  HgA1c <7 mg/dL, /80 or less. LDL of less than 100 and/or less than 70 in a patient with CAD. Verbalized understanding. glucose blood VI test strips (ASCENSIA AUTODISC VI, ONE TOUCH ULTRA TEST VI) strip    Z79.4  Insulin Syringes, Disposable, 1 mL syrg   4. PSVT (paroxysmal supraventricular tachycardia) ([facility]) I47.1 427.0 HR continues to be elevated, Patient is to f/u with cardiology about this. Appointment is not scheduled at this visit. 5. Tobacco abuse Z72.0 305.1 Discussion with patient about the global effects of smoking on their health. Is not ready to stop at this visit. Information printed for patient to review. Urged strongly to quit. Verbalized understanding. 6. Uncontrolled type 2 diabetes mellitus without complication, without long-term current use of insulin ([facility]) E11.65 250.02    7. Pure hyperglyceridemia E78.1 272.1 The patient is aware of our goal to reduce or eliminate the long term problems (such as strokes and heart attacks) related to poorly controlled hyperlipidemia. Discussion about strict diet modification along with as much exercise as possible. Information printed and given to the patient for review. 8. Vitamin D deficiency E55.9 268.9 Patient states that she is taking her medications as prescribed. No concerns at this visit. 9. Exercise counseling Z71.89 V65.41 Physical activity information given to patient and printed for review. 10. Dietary counseling and surveillance Z71.3 V65.3 Dietary information discussed with patient and printed for review. Plan of care:  Discussed diagnoses in detail with patient. Medication risks/benefits/side effects discussed with patient. All of the patient's questions were addressed. The patient understands and agrees with our plan of care.     The patient knows to call back if they are unsure of or forget any changes we discussed today or if the symptoms change. The patient received an After-Visit Summary which contains VS, orders, medication list and allergy list. This can be used as a \"mini-medical record\" should they have to seek medical care while out of town. Patient Care Team:  Hossein Aguilera NP as PCP - General (Nurse Practitioner)  Antwon Brambila MD (Ophthalmology)  Ching Leon, RN as Ambulatory Care Navigator    Follow-up Disposition:  Return in about 2 weeks (around 2/8/2017), or if symptoms worsen or fail to improve.     Future Appointments  Date Time Provider Lucila De Oliveira   2/6/2017 2:05 PM Bee Gutierrez NP [facility] MARGARETTE SCHED       Signed By: Hossein Aguilera NP     February 1, 2017

## 2017-02-06 ENCOUNTER — OFFICE VISIT (OUTPATIENT)
Dept: FAMILY MEDICINE CLINIC | Age: 37
End: 2017-02-06

## 2017-02-06 VITALS
OXYGEN SATURATION: 98 % | HEART RATE: 82 BPM | BODY MASS INDEX: 22.24 KG/M2 | TEMPERATURE: 98.7 F | RESPIRATION RATE: 20 BRPM | WEIGHT: 138.4 LBS | HEIGHT: 66 IN | DIASTOLIC BLOOD PRESSURE: 85 MMHG | SYSTOLIC BLOOD PRESSURE: 122 MMHG

## 2017-02-06 DIAGNOSIS — E11.69 TYPE 2 DIABETES MELLITUS WITH OTHER SPECIFIED COMPLICATION, WITH LONG-TERM CURRENT USE OF INSULIN (HCC): ICD-10-CM

## 2017-02-06 DIAGNOSIS — I47.1 PSVT (PAROXYSMAL SUPRAVENTRICULAR TACHYCARDIA) (HCC): ICD-10-CM

## 2017-02-06 DIAGNOSIS — E78.1 PURE HYPERGLYCERIDEMIA: ICD-10-CM

## 2017-02-06 DIAGNOSIS — Z72.0 TOBACCO ABUSE: ICD-10-CM

## 2017-02-06 DIAGNOSIS — G47.00 INSOMNIA, UNSPECIFIED TYPE: ICD-10-CM

## 2017-02-06 DIAGNOSIS — Z71.82 EXERCISE COUNSELING: ICD-10-CM

## 2017-02-06 DIAGNOSIS — Z71.3 DIETARY COUNSELING AND SURVEILLANCE: ICD-10-CM

## 2017-02-06 DIAGNOSIS — E55.9 VITAMIN D DEFICIENCY: ICD-10-CM

## 2017-02-06 DIAGNOSIS — F39 MOOD DISORDER (HCC): Primary | ICD-10-CM

## 2017-02-06 DIAGNOSIS — Z79.4 TYPE 2 DIABETES MELLITUS WITH OTHER SPECIFIED COMPLICATION, WITH LONG-TERM CURRENT USE OF INSULIN (HCC): ICD-10-CM

## 2017-02-06 RX ORDER — TRAZODONE HYDROCHLORIDE 100 MG/1
150 TABLET ORAL
Qty: 45 TAB | Refills: 0 | Status: SHIPPED | OUTPATIENT
Start: 2017-02-06 | End: 2017-03-08

## 2017-02-06 RX ORDER — CLONAZEPAM 1 MG/1
1 TABLET ORAL 3 TIMES DAILY
Qty: 90 TAB | Refills: 0 | Status: SHIPPED | OUTPATIENT
Start: 2017-02-06 | End: 2017-03-08

## 2017-02-06 NOTE — MR AVS SNAPSHOT
Visit Information Date & Time Provider Department Dept. Phone Encounter #  
 2/6/2017  2:05 PM Violetta Chelly, [facility] [phone] [id number] Upcoming Health Maintenance Date Due Pneumococcal 19-64 Medium Risk (1 of 1 - PPSV23) 7/26/1999 DTaP/Tdap/Td series (1 - Tdap) 7/26/2001 PAP AKA CERVICAL CYTOLOGY 7/26/2001 EYE EXAM RETINAL OR DILATED Q1 6/3/2014 HEMOGLOBIN A1C Q6M 5/7/2017 FOOT EXAM Q1 11/7/2017 MICROALBUMIN Q1 11/7/2017 LIPID PANEL Q1 11/7/2017 Allergies as of 2/6/2017  Review Complete On: 2/6/2017 By: Jerri Certain No Known Allergies Current Immunizations  Never Reviewed Name Date Influenza Vaccine (Quad) PF 11/7/2016 Influenza Vaccine PF 1/15/2014 Not reviewed this visit You Were Diagnosed With   
  
 Codes Comments Mood disorder ([facility]    -  Primary ICD-10-CM: F39 
ICD-9-CM: 296.90 Insomnia, unspecified type     ICD-10-CM: G47.00 ICD-9-CM: 780.52 Type 2 diabetes mellitus with other specified complication, with long-term current use of insulin (HCC)     ICD-10-CM: E11.69, Z79.4 PSVT (paroxysmal supraventricular tachycardia) (HCC)     ICD-10-CM: I47.1 ICD-9-CM: 427.0 Tobacco abuse     ICD-10-CM: Z72.0 ICD-9-CM: 305.1 Pure hyperglyceridemia     ICD-10-CM: E78.1 ICD-9-CM: 272.1 Vitamin D deficiency     ICD-10-CM: E55.9 ICD-9-CM: 268.9 Exercise counseling     ICD-10-CM: Z71.89 ICD-9-CM: V65.41 Dietary counseling and surveillance     ICD-10-CM: Z71.3 ICD-9-CM: V65.3 Vitals BP Pulse Temp Resp Height(growth percentile) Weight(growth percentile) 122/85 82 98.7 °F (37.1 °C) (Oral) 20 5' 6\" (1.676 m) 138 lb 6.4 oz (62.8 kg) LMP SpO2 BMI OB Status Smoking Status 12/10/2012 98% 22.34 kg/m2 Hysterectomy Current Every Day Smoker Vitals History BMI and BSA Data  Body Mass Index Body Surface Area  
 22.34 kg/m 2 1.71 m 2  
 Preferred Pharmacy Pharmacy Name Phone [address] [facility] [place]  [phone] Your Updated Medication List  
  
   
This list is accurate as of: 2/6/17  2:06 PM.  Always use your most recent med list.  
  
  
  
  
 Blood-Glucose Meter monitoring kit Commonly known as:  [address]  
by Does Not Apply route. Use once daily at different times of day  
  
 divalproex  mg tablet Commonly known as:  DEPAKOTE Take 1 Tab by mouth two (2) times a day for 30 days. Indications: Jolene associated with Bipolar Disorder  
  
 glucose blood VI test strips strip Commonly known as:  ASCENSIA AUTODISC VI, ONE TOUCH ULTRA TEST VI  
by Does Not Apply route two (2) times a day. Bid  
  
 insulin glargine 100 unit/mL injection Commonly known as:  LANTUS  
15 Units by SubCUTAneous route daily. Insulin Syringes (Disposable) 1 mL Syrg 2 Syringes by Does Not Apply route daily for 30 days. Lancets Misc E11.9 testing once a day  
  
 lisinopril-hydroCHLOROthiazide 20-25 mg per tablet Commonly known as:  Jaziel Anant Take  by mouth daily. metFORMIN 1,000 mg tablet Commonly known as:  GLUCOPHAGE Take 1,000 mg by mouth two (2) times daily (with meals). metoprolol succinate 25 mg XL tablet Commonly known as:  TOPROL-XL Take 1 Tab by mouth daily. Indications: PAROXYSMAL SUPRAVENTRICULAR TACHYCARDIA  
  
 traZODone 100 mg tablet Commonly known as:  Massimo Adah Take 1 Tab by mouth nightly for 30 days. Indications: insomnia associated with depression Patient Instructions Learning About Healthy Weight What is a healthy weight? A healthy weight is the weight at which you feel good about yourself and have energy for work and play. It's also one that lowers your risk for health problems. What can you do to stay at a healthy weight? It can be hard to stay at a healthy weight, especially when fast food, vending-machine snacks, and processed foods are so easy to find. And with your busy lifestyle, activity may be low on your list of things to do. But staying at a healthy weight may be easier than you think. Here are some dos and don'ts for staying at a healthy weight: 
Do eat healthy foods The kinds of foods you eat have a big impact on both your weight and your health. Reaching and staying at a healthy weight is not about going on a diet. It's about making healthier food choices every day and changing your diet for good. Healthy eating means eating a variety of foods so that you get all the nutrients you need. Your body needs protein, carbohydrate, and fats for energy. They keep your heart beating, your brain active, and your muscles working. On most days, try to eat from each food group. This means eating a variety of: · Whole grains, such as whole wheat breads and pastas. · Fruits and vegetables. · Dairy products, such as low-fat milk, yogurt, and cheese. · Lean proteins, such as all types of fish, chicken without the skin, and beans. Don't have too much or too little of one thing. All foods, if eaten in moderation, can be part of healthy eating. Even sweets can be okay. If your favorite foods are high in fat, salt, sugar, or calories, limit how often you eat them. Eat smaller servings, or look for healthy substitutes. Do watch what you eat Many people eat more than their bodies need. Part of staying at a healthy weight means learning how much food you really need from day to day and not eating more than that. Even with healthy foods, eating too much can make you gain weight. Having a well-balanced diet means that you eat enough, but not too much, and that your food gives you the nutrients you need to stay healthy. So listen to your body. Eat when you're hungry. Stop when you feel satisfied. It's a good idea to have healthy snacks ready for when you get hungry. Keep healthy snacks with you at work, in your car, and at home. If you have a healthy snack easily available, you'll be less likely to pick a candy bar or bag of chips from a vending machine instead. Some healthy snacks you might want to keep on hand are fruit, low-fat yogurt, string cheese, low-fat microwave popcorn, raisins and other dried fruit, nuts, whole wheat crackers, pretzels, carrots, celery sticks, and broccoli. Do some physical activity A big part of reaching and staying at a healthy weight is being active. When you're active, you burn calories. This makes it easier to reach and stay at a healthy weight. When you're active on a regular basis, your body burns more calories, even when you're at rest. Being active helps you lose fat and build lean muscle. Try to be active for at least 1 hour every day. This may sound like a lot, but it's okay to be active in smaller blocks of time that add up to 1 hour a day. Any activity that makes your heart beat faster and keeps it there for a while counts. A brisk walk, run, or swim will get your heart beating faster. So will climbing stairs, shooting baskets, or cycling. Even some household chores like vacuuming and mowing the lawn will get your heart rate up. Pick activities that you enjoyones that make your heart beat faster, your muscles stronger, and your muscles and joints more flexible. If you find more than one thing you like doing, do them all. You don't have to do the same thing every day. Don't diet Diets don't work. Diets are temporary. Because you give up so much when you diet, you may be hungry and think about food all the time. And after you stop dieting, you also may overeat to make up for what you missed. Most people who diet end up gaining back the pounds they lostand more. Remember that healthy bodies come in lots of shapes and sizes.  Everyone can get healthier by eating better and being more active. Where can you learn more? Go to http://stewart-delaney.info/. Enter 933 6565 in the search box to learn more about \"Learning About Healthy Weight. \" Current as of: February 16, 2016 Content Version: 11.1 © 0570-3416 [employer]. Care instructions adapted under license by [employer] (which disclaims liability or warranty for this information). If you have questions about a medical condition or this instruction, always ask your healthcare professional. [address] any warranty or liability for your use of this information. High Cholesterol: Care Instructions Your Care Instructions Cholesterol is a type of fat in your blood. It is needed for many body functions, such as making new cells. Cholesterol is made by your body. It also comes from food you eat. High cholesterol means that you have too much of the fat in your blood. This raises your risk of a heart attack and stroke. LDL and HDL are part of your total cholesterol. LDL is the \"bad\" cholesterol. High LDL can raise your risk for heart disease, heart attack, and stroke. HDL is the \"good\" cholesterol. It helps clear bad cholesterol from the body. High HDL is linked with a lower risk of heart disease, heart attack, and stroke. Your cholesterol levels help your doctor find out your risk for having a heart attack or stroke. You and your doctor can talk about whether you need to lower your risk and what treatment is best for you. A heart-healthy lifestyle along with medicines can help lower your cholesterol and your risk. The way you choose to lower your risk will depend on how high your risk is for heart attack and stroke. It will also depend on how you feel about taking medicines. Follow-up care is a key part of your treatment and safety.  Be sure to make and go to all appointments, and call your doctor if you are having problems. It's also a good idea to know your test results and keep a list of the medicines you take. How can you care for yourself at home? · Eat a variety of foods every day. Good choices include fruits, vegetables, whole grains (like oatmeal), dried beans and peas, nuts and seeds, soy products (like tofu), and fat-free or low-fat dairy products. · Replace butter, margarine, and hydrogenated or partially hydrogenated oils with olive and canola oils. (Canola oil margarine without trans fat is fine.) · Replace red meat with fish, poultry, and soy protein (like tofu). · Limit processed and packaged foods like chips, crackers, and cookies. · Bake, broil, or steam foods. Don't adams them. · Be physically active. Get at least 30 minutes of exercise on most days of the week. Walking is a good choice. You also may want to do other activities, such as running, swimming, cycling, or playing tennis or team sports. · Stay at a healthy weight or lose weight by making the changes in eating and physical activity listed above. Losing just a small amount of weight, even 5 to 10 pounds, can reduce your risk for having a heart attack or stroke. · Do not smoke. When should you call for help? Watch closely for changes in your health, and be sure to contact your doctor if: 
· You need help making lifestyle changes. · You have questions about your medicine. Where can you learn more? Go to http://stewart-delaney.info/. Enter W701 in the search box to learn more about \"High Cholesterol: Care Instructions. \" Current as of: January 27, 2016 Content Version: 11.1 © 3187-2593 [employer]. Care instructions adapted under license by [employer] (which disclaims liability or warranty for this information).  If you have questions about a medical condition or this instruction, always ask your healthcare professional. [employer] disclaims any warranty or liability for your use of this information. Introducing [facility]! Fouzia Baileynorah introduces [employer] patient portal. Now you can access parts of your medical record, email your doctor's office, and request medication refills online. 1. In your internet browser, go to [email or web address] 2. Click on the First Time User? Click Here link in the Sign In box. You will see the New Member Sign Up page. 3. Enter your [employer] Access Code exactly as it appears below. You will not need to use this code after youve completed the sign-up process. If you do not sign up before the expiration date, you must request a new code. · [employer] Access Code: [email or web address] Expires: 5/7/2017  1:05 PM 
 
4. Enter the last four digits of your Social Security Number (xxxx) and Date of Birth (mm/dd/yyyy) as indicated and click Submit. You will be taken to the next sign-up page. 5. Create a [employer] ID. This will be your [employer] login ID and cannot be changed, so think of one that is secure and easy to remember. 6. Create a [employer] password. You can change your password at any time. 7. Enter your Password Reset Question and Answer. This can be used at a later time if you forget your password. 8. Enter your e-mail address. You will receive e-mail notification when new information is available in [address] 9. Click Sign Up. You can now view and download portions of your medical record. 10. Click the Download Summary menu link to download a portable copy of your medical information. If you have questions, please visit the Frequently Asked Questions section of the [employer] website. Remember, [employer] is NOT to be used for urgent needs. For medical emergencies, dial 911. Now available from your iPhone and Android! Please provide this summary of care documentation to your next provider. Your primary care clinician is listed as [address] [place] [address]. If you have any questions after today's visit, please call [phone].

## 2017-02-06 NOTE — PATIENT INSTRUCTIONS
Learning About Healthy Weight  What is a healthy weight? A healthy weight is the weight at which you feel good about yourself and have energy for work and play. It's also one that lowers your risk for health problems. What can you do to stay at a healthy weight? It can be hard to stay at a healthy weight, especially when fast food, vending-machine snacks, and processed foods are so easy to find. And with your busy lifestyle, activity may be low on your list of things to do. But staying at a healthy weight may be easier than you think. Here are some dos and don'ts for staying at a healthy weight:  Do eat healthy foods  The kinds of foods you eat have a big impact on both your weight and your health. Reaching and staying at a healthy weight is not about going on a diet. It's about making healthier food choices every day and changing your diet for good. Healthy eating means eating a variety of foods so that you get all the nutrients you need. Your body needs protein, carbohydrate, and fats for energy. They keep your heart beating, your brain active, and your muscles working. On most days, try to eat from each food group. This means eating a variety of:  · Whole grains, such as whole wheat breads and pastas. · Fruits and vegetables. · Dairy products, such as low-fat milk, yogurt, and cheese. · Lean proteins, such as all types of fish, chicken without the skin, and beans. Don't have too much or too little of one thing. All foods, if eaten in moderation, can be part of healthy eating. Even sweets can be okay. If your favorite foods are high in fat, salt, sugar, or calories, limit how often you eat them. Eat smaller servings, or look for healthy substitutes. Do watch what you eat  Many people eat more than their bodies need. Part of staying at a healthy weight means learning how much food you really need from day to day and not eating more than that.  Even with healthy foods, eating too much can make you gain weight. Having a well-balanced diet means that you eat enough, but not too much, and that your food gives you the nutrients you need to stay healthy. So listen to your body. Eat when you're hungry. Stop when you feel satisfied. It's a good idea to have healthy snacks ready for when you get hungry. Keep healthy snacks with you at work, in your car, and at home. If you have a healthy snack easily available, you'll be less likely to pick a candy bar or bag of chips from a vending machine instead. Some healthy snacks you might want to keep on hand are fruit, low-fat yogurt, string cheese, low-fat microwave popcorn, raisins and other dried fruit, nuts, whole wheat crackers, pretzels, carrots, celery sticks, and broccoli. Do some physical activity  A big part of reaching and staying at a healthy weight is being active. When you're active, you burn calories. This makes it easier to reach and stay at a healthy weight. When you're active on a regular basis, your body burns more calories, even when you're at rest. Being active helps you lose fat and build lean muscle. Try to be active for at least 1 hour every day. This may sound like a lot, but it's okay to be active in smaller blocks of time that add up to 1 hour a day. Any activity that makes your heart beat faster and keeps it there for a while counts. A brisk walk, run, or swim will get your heart beating faster. So will climbing stairs, shooting baskets, or cycling. Even some household chores like vacuuming and mowing the lawn will get your heart rate up. Pick activities that you enjoy--ones that make your heart beat faster, your muscles stronger, and your muscles and joints more flexible. If you find more than one thing you like doing, do them all. You don't have to do the same thing every day. Don't diet  Diets don't work. Diets are temporary. Because you give up so much when you diet, you may be hungry and think about food all the time.  And after you stop dieting, you also may overeat to make up for what you missed. Most people who diet end up gaining back the pounds they lost--and more. Remember that healthy bodies come in lots of shapes and sizes. Everyone can get healthier by eating better and being more active. Where can you learn more? Go to http://stewart-delaney.info/. Enter 631 1251 in the search box to learn more about \"Learning About Healthy Weight. \"  Current as of: February 16, 2016  Content Version: 11.1  © 8602-4408 [employer]. Care instructions adapted under license by [employer] (which disclaims liability or warranty for this information). If you have questions about a medical condition or this instruction, always ask your healthcare professional. [address] any warranty or liability for your use of this information. High Cholesterol: Care Instructions  Your Care Instructions  Cholesterol is a type of fat in your blood. It is needed for many body functions, such as making new cells. Cholesterol is made by your body. It also comes from food you eat. High cholesterol means that you have too much of the fat in your blood. This raises your risk of a heart attack and stroke. LDL and HDL are part of your total cholesterol. LDL is the \"bad\" cholesterol. High LDL can raise your risk for heart disease, heart attack, and stroke. HDL is the \"good\" cholesterol. It helps clear bad cholesterol from the body. High HDL is linked with a lower risk of heart disease, heart attack, and stroke. Your cholesterol levels help your doctor find out your risk for having a heart attack or stroke. You and your doctor can talk about whether you need to lower your risk and what treatment is best for you. A heart-healthy lifestyle along with medicines can help lower your cholesterol and your risk. The way you choose to lower your risk will depend on how high your risk is for heart attack and stroke.  It will also depend on how you feel about taking medicines. Follow-up care is a key part of your treatment and safety. Be sure to make and go to all appointments, and call your doctor if you are having problems. It's also a good idea to know your test results and keep a list of the medicines you take. How can you care for yourself at home? · Eat a variety of foods every day. Good choices include fruits, vegetables, whole grains (like oatmeal), dried beans and peas, nuts and seeds, soy products (like tofu), and fat-free or low-fat dairy products. · Replace butter, margarine, and hydrogenated or partially hydrogenated oils with olive and canola oils. (Canola oil margarine without trans fat is fine.)  · Replace red meat with fish, poultry, and soy protein (like tofu). · Limit processed and packaged foods like chips, crackers, and cookies. · Bake, broil, or steam foods. Don't adams them. · Be physically active. Get at least 30 minutes of exercise on most days of the week. Walking is a good choice. You also may want to do other activities, such as running, swimming, cycling, or playing tennis or team sports. · Stay at a healthy weight or lose weight by making the changes in eating and physical activity listed above. Losing just a small amount of weight, even 5 to 10 pounds, can reduce your risk for having a heart attack or stroke. · Do not smoke. When should you call for help? Watch closely for changes in your health, and be sure to contact your doctor if:  · You need help making lifestyle changes. · You have questions about your medicine. Where can you learn more? Go to http://stewart-delaney.info/. Enter P194 in the search box to learn more about \"High Cholesterol: Care Instructions. \"  Current as of: January 27, 2016  Content Version: 11.1  © 4729-5497 [employer].  Care instructions adapted under license by [employer] (which disclaims liability or warranty for this information). If you have questions about a medical condition or this instruction, always ask your healthcare professional. [place] [address] any warranty or liability for your use of this information.

## 2017-02-06 NOTE — PROGRESS NOTES
Health Maintenance Due   Topic Date Due    Pneumococcal 19-64 Medium Risk (1 of 1 - PPSV23) 07/26/1999    DTaP/Tdap/Td series (1 - Tdap) 07/26/2001    PAP AKA CERVICAL CYTOLOGY  07/26/2001    EYE EXAM RETINAL OR DILATED Q1  06/03/2014       Diabetic Bundle:  LDL-76  A1C-11.6  BP-  Smoking? yes  Anticoagulation medication?  No  Eye exam dilated?due  Foot exam?due

## 2017-02-06 NOTE — PROGRESS NOTES
Progress Note    Patient: Ten Holt MRN: [id number]  SSN: [id number]    YOB: 1980  Age: 39 y.o. Sex: female        Chief Complaint   Patient presents with    Diabetes    Medication Evaluation         Subjective:   Cardiovascular Review:  The patient has diabetes, hypertension and hyperlipidemia. Diet and Lifestyle: generally follows a low fat low cholesterol diet, exercises sporadically, smoker 1 ppd, caffeine intake moderate, alcohol intake none  Home BP Monitoring: is well controlled at home, ranging 120's/70's. Pertinent ROS: taking medications as instructed, no medication side effects noted, no TIA's, no chest pain on exertion, no dyspnea on exertion, no swelling of ankles, no orthostatic dizziness or lightheadedness, no orthopnea or paroxysmal nocturnal dyspnea, no palpitations.       Diabetes Mellitus:  She has diabetes mellitus, and hypertension and hyperlipidemia. Diabetic ROS - medication compliance: compliant all of the time, diabetic diet compliance: compliant all of the time, home glucose monitoring: is performed regularly, fasting values range 130. Lab review: most recent lipid panel reviewed, showing LDL result does not yet meet goal, HDL normal, triglycerides normal, liver functions are normal, A1c is >11. Patient states that she is checking her blood sugars twice a day. Has a log with the results. Highest was 132 and lowest was 105. Patient relates that she is watching her diet and attempting to get some exercise.     Anxiety  Patient complains of evaluation of anxiety disorder, bipolar disorder. She has the following anxiety symptoms: palpitations, racing thoughts, psychomotor agitation, feelings of losing control, difficulty concentrating. Onset of symptoms was approximately 4 months ago, gradually worsening since that time. She denies current suicidal and homicidal ideation. Family history significant for nothing. Possible organic causes contributing are: none.  Risk factors: negative life event multiple and previous episode of depression Previous treatment includes Celexa. Previous treatment includes Celexa and no other therapies. She complains of the following side effects from the treatment: none. Patient states that she is taking her medications as prescribed. Is doing well at this visit. States that her mood swings are better and she is getting some sleep. Relates that she is taking care of her 2 girls now and that is helping her to keep focused and keep active. Encounter Diagnoses   Name Primary?  Mood disorder (HCC) Yes    Insomnia, unspecified type     Type 2 diabetes mellitus with other specified complication, with long-term current use of insulin (HCC)     PSVT (paroxysmal supraventricular tachycardia) (HCC)     Tobacco abuse     Pure hyperglyceridemia     Vitamin D deficiency     Exercise counseling     Dietary counseling and surveillance        Current and past medical information:    Current Medications after this visit[de-identified]     Current Outpatient Prescriptions   Medication Sig    traZODone (DESYREL) 100 mg tablet Take 1.5 Tabs by mouth nightly for 30 days. Indications: insomnia associated with depression    clonazePAM (KLONOPIN) 1 mg tablet Take 1 Tab by mouth three (3) times daily for 30 days. Max Daily Amount: 3 mg. Indications: SARA ASSOCIATED WITH BIPOLAR DISORDER, ADJUNCT    divalproex DR (DEPAKOTE) 500 mg tablet Take 1 Tab by mouth two (2) times a day for 30 days. Indications: Sara associated with Bipolar Disorder    glucose blood VI test strips (ASCENSIA AUTODISC VI, ONE TOUCH ULTRA TEST VI) strip by Does Not Apply route two (2) times a day. Bid    Insulin Syringes, Disposable, 1 mL syrg 2 Syringes by Does Not Apply route daily for 30 days.  insulin glargine (LANTUS) 100 unit/mL injection 15 Units by SubCUTAneous route daily.  metoprolol succinate (TOPROL-XL) 25 mg XL tablet Take 1 Tab by mouth daily.  Indications: PAROXYSMAL SUPRAVENTRICULAR TACHYCARDIA    lisinopril-hydroCHLOROthiazide (PRINZIDE, ZESTORETIC) 20-25 mg per tablet Take  by mouth daily.  metFORMIN (GLUCOPHAGE) 1,000 mg tablet Take 1,000 mg by mouth two (2) times daily (with meals).  Lancets misc E11.9 testing once a day    Blood-Glucose Meter (ONE TOUCH BASIC SYSTEM) monitoring kit by Does Not Apply route. Use once daily at different times of day     No current facility-administered medications for this visit. Patient Active Problem List    Diagnosis Date Noted    H/O medication noncompliance 11/23/2016    Toenail fungus 08/07/2013    Emotional lability 05/31/2013    Hyperlipidemia 04/29/2013    Anxiety 04/29/2013    Anemia 03/18/2011    Tobacco abuse 06/21/2010    HTN (hypertension), benign 05/19/2010    Diabetes mellitus ([facility] [address] 05/19/2010    Cholesterol serum increased 05/19/2010    Menorrhagia, premenopausal 05/19/2010       Past Medical History   Diagnosis Date    Cholesterol serum increased 5/19/2010    Diabetes mellitus ([facility] [address] 5/19/2010    HTN (hypertension), benign 5/19/2010       No Known Allergies    Past Surgical History   Procedure Laterality Date    Hx gyn       s/p Hysterectomy       Social History     Social History    Marital status: SINGLE     Spouse name: N/A    Number of children: N/A    Years of education: N/A     Social History Main Topics    Smoking status: Current Every Day Smoker     Packs/day: 1.00    Smokeless tobacco: Never Used    Alcohol use No    Drug use: No    Sexual activity: Yes     Other Topics Concern    None     Social History Narrative       Review of Systems   Constitutional: Negative. Negative for chills, diaphoresis, fever, malaise/fatigue and weight loss. HENT: Negative. Negative for congestion and sore throat. Eyes: Negative. Negative for blurred vision, double vision and photophobia. Respiratory: Negative.   Negative for cough, hemoptysis, sputum production, shortness of breath and wheezing. Cardiovascular: Negative. Negative for chest pain, palpitations, orthopnea, claudication, leg swelling and PND. Gastrointestinal: Negative. Negative for abdominal pain, blood in stool, constipation, diarrhea, heartburn, melena, nausea and vomiting. Genitourinary: Negative. Negative for dysuria, flank pain, frequency, hematuria and urgency. Musculoskeletal: Negative. Negative for back pain, falls, joint pain, myalgias and neck pain. Skin: Negative. Negative for itching and rash. Neurological: Negative. Negative for dizziness, tingling, tremors, sensory change, speech change, focal weakness, seizures, loss of consciousness, weakness and headaches. Endo/Heme/Allergies: Negative. Negative for environmental allergies and polydipsia. Does not bruise/bleed easily. Psychiatric/Behavioral: Negative for depression, hallucinations, memory loss, substance abuse and suicidal ideas. The patient is nervous/anxious. The patient does not have insomnia. Objective:     Vitals:    02/06/17 1314   BP: 122/85   Pulse: 82   Resp: 20   Temp: 98.7 °F (37.1 °C)   TempSrc: Oral   SpO2: 98%   Weight: 138 lb 6.4 oz (62.8 kg)   Height: 5' 6\" (1.676 m)      Body mass index is 22.34 kg/(m^2). Physical Exam   Constitutional: She is oriented to person, place, and time and well-developed, well-nourished, and in no distress. No distress. HENT:   Head: Normocephalic and atraumatic. Right Ear: External ear normal.   Left Ear: External ear normal.   Nose: Nose normal.   Mouth/Throat: Oropharynx is clear and moist. No oropharyngeal exudate. Eyes: Conjunctivae and EOM are normal. Pupils are equal, round, and reactive to light. Right eye exhibits no discharge. Left eye exhibits no discharge. Neck: Normal range of motion. Neck supple. No JVD present. No tracheal deviation present. No thyromegaly present. Cardiovascular: Normal rate, regular rhythm, normal heart sounds and intact distal pulses.   Exam reveals no gallop and no friction rub. No murmur heard. Pulmonary/Chest: Effort normal and breath sounds normal. No stridor. No respiratory distress. She has no wheezes. She has no rales. She exhibits no tenderness. Abdominal: Soft. Bowel sounds are normal. She exhibits no distension and no mass. There is no tenderness. There is no rebound and no guarding. Musculoskeletal: Normal range of motion. She exhibits no edema, tenderness or deformity. Lymphadenopathy:     She has no cervical adenopathy. Neurological: She is alert and oriented to person, place, and time. She displays normal reflexes. No cranial nerve deficit. She exhibits normal muscle tone. Gait normal. Coordination normal. GCS score is 15. Skin: Skin is warm and dry. No rash noted. She is not diaphoretic. No erythema. No pallor. Psychiatric: Mood, memory, affect and judgment normal.   Nursing note and vitals reviewed. Health Maintenance Due   Topic Date Due    Pneumococcal 19-64 Medium Risk (1 of 1 - PPSV23) 07/26/1999    DTaP/Tdap/Td series (1 - Tdap) 07/26/2001    PAP AKA CERVICAL CYTOLOGY  07/26/2001    EYE EXAM RETINAL OR DILATED Q1  06/03/2014       Assessment and orders:       ICD-10-CM ICD-9-CM    1. Mood disorder [facility]) F39 296.90 Patient is doing well on the medications. Continues with some insomnia. Will start on trazodone. Patient to continue with her current treatment and continue to attempt to get an appointment at [facility] for further evaluation. clonazePAM (KLONOPIN) 1 mg tablet   2. Insomnia, unspecified type G47.00 780.52 traZODone (DESYREL) 100 mg tablet   3. Type 2 diabetes mellitus with other specified complication, with long-term current use of insulin ([facility]) E11.69  Patient to check  glucose at least daily. Instructions on foot care discussed with patient with information printed and given to patient for review.   Dietary guidelines along with the need to get exercise discussed with patient to help with getting blood sugar down along with the A1c. Patient verbalized understanding. Carbohydrate counting reinforced. Patient to continue to work on compliance with medications and preventive appointments. Verbalized understanding. Z79.4     4. PSVT (paroxysmal supraventricular tachycardia) (HCC) I47.1 427.0 Stable at this visit. Patient denies any problems with feeling her heart racing. 5. Tobacco abuse Z72.0 305.1 Discussion with patient about the global effects of smoking on their health. Is not ready to stop at this visit. Information printed for patient to review. Urged strongly to quit. Verbalized understanding. 6. Pure hyperglyceridemia E78.1 272.1 The patient is aware of our goal to reduce or eliminate the long term problems (such as strokes and heart attacks) related to poorly controlled hyperlipidemia. Discussion about strict diet modification along with as much exercise as possible. Information printed and given to the patient for review. 7. Vitamin D deficiency E55.9 268.9    8. Exercise counseling Z71.89 V65.41 Physical activity information given to patient and printed for review. 9. Dietary counseling and surveillance Z71.3 V65.3 Dietary information discussed with patient and printed for review. Plan of care:  Discussed diagnoses in detail with patient. Medication risks/benefits/side effects discussed with patient. All of the patient's questions were addressed. The patient understands and agrees with our plan of care. The patient knows to call back if they are unsure of or forget any changes we discussed today or if the symptoms change. The patient received an After-Visit Summary which contains VS, orders, medication list and allergy list. This can be used as a \"mini-medical record\" should they have to seek medical care while out of town.     Patient Care Team:  Zonia Raines NP as PCP - General (Nurse Practitioner)  Grecia Lees MD (Ophthalmology)  Brannon Gandhi RN as Ambulatory Care Navigator    Follow-up Disposition: Not on File    No future appointments.     Signed By: Jeromy Barboza NP     February 16, 2017

## 2017-03-21 RX ORDER — TRAZODONE HYDROCHLORIDE 100 MG/1
150 TABLET ORAL
Qty: 45 TAB | Refills: 0 | Status: SHIPPED | OUTPATIENT
Start: 2017-03-21 | End: 2021-01-26 | Stop reason: ALTCHOICE

## 2017-03-21 RX ORDER — CLONAZEPAM 1 MG/1
1 TABLET ORAL 3 TIMES DAILY
Qty: 90 TAB | Refills: 0 | Status: SHIPPED | OUTPATIENT
Start: 2017-03-21 | End: 2021-01-26 | Stop reason: ALTCHOICE

## 2017-03-28 ENCOUNTER — OFFICE VISIT (OUTPATIENT)
Dept: FAMILY MEDICINE CLINIC | Age: 37
End: 2017-03-28

## 2017-03-28 VITALS
OXYGEN SATURATION: 99 % | BODY MASS INDEX: 24.43 KG/M2 | HEIGHT: 66 IN | SYSTOLIC BLOOD PRESSURE: 169 MMHG | DIASTOLIC BLOOD PRESSURE: 102 MMHG | TEMPERATURE: 98.5 F | WEIGHT: 152 LBS | RESPIRATION RATE: 14 BRPM | HEART RATE: 92 BPM

## 2017-03-28 DIAGNOSIS — E78.2 MIXED HYPERLIPIDEMIA: ICD-10-CM

## 2017-03-28 DIAGNOSIS — E11.69 TYPE 2 DIABETES MELLITUS WITH OTHER SPECIFIED COMPLICATION, WITH LONG-TERM CURRENT USE OF INSULIN (HCC): Primary | ICD-10-CM

## 2017-03-28 DIAGNOSIS — Z79.899 ENCOUNTER FOR LONG TERM BENZODIAZEPINE THERAPY: ICD-10-CM

## 2017-03-28 DIAGNOSIS — Z79.4 TYPE 2 DIABETES MELLITUS WITH OTHER SPECIFIED COMPLICATION, WITH LONG-TERM CURRENT USE OF INSULIN (HCC): Primary | ICD-10-CM

## 2017-03-28 DIAGNOSIS — I10 HTN (HYPERTENSION), BENIGN: ICD-10-CM

## 2017-03-28 DIAGNOSIS — Z72.0 TOBACCO ABUSE: ICD-10-CM

## 2017-03-28 DIAGNOSIS — F41.9 ANXIETY: ICD-10-CM

## 2017-03-28 LAB
ALBUMIN UR QL STRIP: 30 MG/L
BILIRUB UR QL STRIP: NEGATIVE
CREATININE, URINE POC: 100 MG/DL
GLUCOSE UR-MCNC: NORMAL MG/DL
KETONES P FAST UR STRIP-MCNC: NEGATIVE MG/DL
MICROALBUMIN/CREAT RATIO POC: <30 MG/G
PH UR STRIP: 7 [PH] (ref 4.6–8)
PROT UR QL STRIP: NEGATIVE MG/DL
SP GR UR STRIP: 1.02 (ref 1–1.03)
UA UROBILINOGEN AMB POC: NORMAL (ref 0.2–1)
URINALYSIS CLARITY POC: CLEAR
URINALYSIS COLOR POC: YELLOW
URINE BLOOD POC: NORMAL
URINE LEUKOCYTES POC: NEGATIVE
URINE NITRITES POC: NEGATIVE

## 2017-03-28 RX ORDER — LISINOPRIL AND HYDROCHLOROTHIAZIDE 20; 25 MG/1; MG/1
1 TABLET ORAL DAILY
Qty: 30 TAB | Refills: 3 | Status: SHIPPED | OUTPATIENT
Start: 2017-03-28 | End: 2021-03-09 | Stop reason: ALTCHOICE

## 2017-03-28 RX ORDER — METFORMIN HYDROCHLORIDE 1000 MG/1
1000 TABLET ORAL 2 TIMES DAILY WITH MEALS
Qty: 60 TAB | Refills: 3 | Status: SHIPPED | OUTPATIENT
Start: 2017-03-28 | End: 2021-03-09 | Stop reason: SDUPTHER

## 2017-03-28 NOTE — PROGRESS NOTES
Reviewed record in preparation for visit and have necessary documentation      Body mass index is 24.53 kg/(m^2).     Health Maintenance Due   Topic Date Due    Pneumococcal 19-64 Medium Risk (1 of 1 - PPSV23) 07/26/1999    DTaP/Tdap/Td series (1 - Tdap) 07/26/2001    PAP AKA CERVICAL CYTOLOGY  07/26/2001    EYE EXAM RETINAL OR DILATED Q1  06/03/2014

## 2017-03-29 LAB
ALBUMIN SERPL-MCNC: 4.4 G/DL (ref 3.5–5.5)
ALBUMIN/GLOB SERPL: 1.5 {RATIO} (ref 1.2–2.2)
ALP SERPL-CCNC: 67 IU/L (ref 39–117)
ALT SERPL-CCNC: 11 IU/L (ref 0–32)
AST SERPL-CCNC: 12 IU/L (ref 0–40)
BILIRUB SERPL-MCNC: 0.4 MG/DL (ref 0–1.2)
BUN SERPL-MCNC: 11 MG/DL (ref 6–20)
BUN/CREAT SERPL: 15 (ref 8–20)
CALCIUM SERPL-MCNC: 9.2 MG/DL (ref 8.7–10.2)
CHLORIDE SERPL-SCNC: 97 MMOL/L (ref 96–106)
CHOLEST SERPL-MCNC: 112 MG/DL (ref 100–199)
CO2 SERPL-SCNC: 23 MMOL/L (ref 18–29)
CREAT SERPL-MCNC: 0.74 MG/DL (ref 0.57–1)
ERYTHROCYTE [DISTWIDTH] IN BLOOD BY AUTOMATED COUNT: 15.3 % (ref 12.3–15.4)
EST. AVERAGE GLUCOSE BLD GHB EST-MCNC: 154 MG/DL
GLOBULIN SER CALC-MCNC: 3 G/DL (ref 1.5–4.5)
GLUCOSE SERPL-MCNC: 244 MG/DL (ref 65–99)
HBA1C MFR BLD: 7 % (ref 4.8–5.6)
HCT VFR BLD AUTO: 41 % (ref 34–46.6)
HDLC SERPL-MCNC: 59 MG/DL
HGB BLD-MCNC: 13.9 G/DL (ref 11.1–15.9)
LDLC SERPL CALC-MCNC: 44 MG/DL (ref 0–99)
MCH RBC QN AUTO: 31.2 PG (ref 26.6–33)
MCHC RBC AUTO-ENTMCNC: 33.9 G/DL (ref 31.5–35.7)
MCV RBC AUTO: 92 FL (ref 79–97)
PLATELET # BLD AUTO: 244 X10E3/UL (ref 150–379)
POTASSIUM SERPL-SCNC: 4 MMOL/L (ref 3.5–5.2)
PROT SERPL-MCNC: 7.4 G/DL (ref 6–8.5)
RBC # BLD AUTO: 4.46 X10E6/UL (ref 3.77–5.28)
SODIUM SERPL-SCNC: 136 MMOL/L (ref 134–144)
TRIGL SERPL-MCNC: 44 MG/DL (ref 0–149)
TSH SERPL DL<=0.005 MIU/L-ACNC: 0.9 UIU/ML (ref 0.45–4.5)
VLDLC SERPL CALC-MCNC: 9 MG/DL (ref 5–40)
WBC # BLD AUTO: 5.9 X10E3/UL (ref 3.4–10.8)

## 2017-03-31 NOTE — PROGRESS NOTES
Patient request for refill of seroquel. Using more than prescribed dosing and has run out early. Has follow up on 1/25/17. Patient: Gonzalo Kocher MRN: [id number]  SSN: [id number]    YOB: 1980  Age: 39 y.o. Sex: female        Subjective:     Chief Complaint   Patient presents with    Medication Refill    Hypertension       HPI: she is a 39y.o. year old female who presents for follow up. Patient with hx of uncontrolled DM2, HTN, HLD, and tobacco abuse. She asks for refills of clonazepam and trazodone. Also says that she has been out of her other medications. However there was no request for a refill of these with that for clonazepam. Clonazepam was prescribed by another provider and faxed to pharmacy one week ago. When I asked about this, patient said she had not picked this up yet. I went to ask the nurse to call the pharmacy to check on the medication and it was confirmed that this was still at the pharmacy. When I returned patient was gathering her belongs to leave exam room which she did saying she was beginning to get upset about her clonazepam. Review of prior medical records show patient with recent drug screens positive for opioid and cannabis. I have previously informed patient that I would not write current amounts of clonazepam. Particularly given patient's hx of drug abuse. Will discuss with . BP Readings from Last 3 Encounters:   03/28/17 (!) 169/102   02/06/17 122/85   01/25/17 (!) 149/102     Wt Readings from Last 3 Encounters:   03/28/17 152 lb (68.9 kg)   02/06/17 138 lb 6.4 oz (62.8 kg)   01/25/17 131 lb (59.4 kg)     Body mass index is 24.53 kg/(m^2).       Lab Results   Component Value Date/Time    WBC 5.9 03/28/2017 10:45 AM    Hemoglobin (POC) 12.5 10/04/2013 04:33 PM    HGB 13.9 03/28/2017 10:45 AM    HCT 41.0 03/28/2017 10:45 AM    PLATELET 902 75/34/2194 10:45 AM    MCV 92 03/28/2017 10:45 AM       Lab Results   Component Value Date/Time    Cholesterol, total 112 03/28/2017 10:45 AM    HDL Cholesterol 59 03/28/2017 10:45 AM    LDL, calculated 44 03/28/2017 10:45 AM    Triglyceride 44 03/28/2017 10:45 AM       Lab Results   Component Value Date/Time    Sodium 136 03/28/2017 10:45 AM    Potassium 4.0 03/28/2017 10:45 AM    Chloride 97 03/28/2017 10:45 AM    CO2 23 03/28/2017 10:45 AM    Glucose 244 03/28/2017 10:45 AM    BUN 11 03/28/2017 10:45 AM    Creatinine 0.74 03/28/2017 10:45 AM    BUN/Creatinine ratio 15 03/28/2017 10:45 AM    GFR est  03/28/2017 10:45 AM    GFR est non- 03/28/2017 10:45 AM    Calcium 9.2 03/28/2017 10:45 AM    Bilirubin, total 0.4 03/28/2017 10:45 AM    ALT (SGPT) 11 03/28/2017 10:45 AM    AST (SGOT) 12 03/28/2017 10:45 AM    Alk. phosphatase 67 03/28/2017 10:45 AM    Protein, total 7.4 03/28/2017 10:45 AM    Albumin 4.4 03/28/2017 10:45 AM    A-G Ratio 1.5 03/28/2017 10:45 AM      Lab Results   Component Value Date/Time    Hemoglobin A1c 7.0 03/28/2017 10:45 AM          Encounter Diagnoses   Name Primary?  Type 2 diabetes mellitus with other specified complication, with long-term current use of insulin (HCC) Yes    Mixed hyperlipidemia     HTN (hypertension), benign     Anxiety     Encounter for long term benzodiazepine therapy        Current and past medical information:    Current Medications after this visit[de-identified]     Current Outpatient Prescriptions   Medication Sig    lisinopril-hydroCHLOROthiazide (PRINZIDE, ZESTORETIC) 20-25 mg per tablet Take 1 Tab by mouth daily.  metFORMIN (GLUCOPHAGE) 1,000 mg tablet Take 1 Tab by mouth two (2) times daily (with meals).  traZODone (DESYREL) 100 mg tablet Take 1.5 Tabs by mouth nightly.  clonazePAM (KLONOPIN) 1 mg tablet Take 1 Tab by mouth three (3) times daily. Max Daily Amount: 3 mg.  glucose blood VI test strips (ASCENSIA AUTODISC VI, ONE TOUCH ULTRA TEST VI) strip by Does Not Apply route two (2) times a day.  Bid    insulin glargine (LANTUS) 100 unit/mL injection 15 Units by SubCUTAneous route daily.  metoprolol succinate (TOPROL-XL) 25 mg XL tablet Take 1 Tab by mouth daily. Indications: PAROXYSMAL SUPRAVENTRICULAR TACHYCARDIA    Lancets misc E11.9 testing once a day    Blood-Glucose Meter ([address]) monitoring kit by Does Not Apply route. Use once daily at different times of day     No current facility-administered medications for this visit.         Patient Active Problem List    Diagnosis Date Noted    H/O medication noncompliance 11/23/2016    Toenail fungus 08/07/2013    Emotional lability 05/31/2013    Hyperlipidemia 04/29/2013    Anxiety 04/29/2013    Anemia 03/18/2011    Tobacco abuse 06/21/2010    HTN (hypertension), benign 05/19/2010    Diabetes mellitus ([facility] [address] 05/19/2010    Cholesterol serum increased 05/19/2010    Menorrhagia, premenopausal 05/19/2010       Past Medical History:   Diagnosis Date    Cholesterol serum increased 5/19/2010    Diabetes mellitus ([facility] [address] 5/19/2010    HTN (hypertension), benign 5/19/2010       No Known Allergies    Past Surgical History:   Procedure Laterality Date    HX GYN      s/p Hysterectomy       Social History     Social History    Marital status: SINGLE     Spouse name: N/A    Number of children: N/A    Years of education: N/A     Social History Main Topics    Smoking status: Current Every Day Smoker     Packs/day: 1.00    Smokeless tobacco: Never Used    Alcohol use No    Drug use: No    Sexual activity: Yes     Other Topics Concern    None     Social History Narrative         Objective:     Review of Systems:  Constitutional: Negative for fatigue or malaise  Cardiovascular: Negative for dizziness, chest pain or palpitations  Respiratory: Negative for cough, wheezing or SOB  Muscoloskeletal: Negative for acute myalgias or arthralgias   Neurological: Negative for headache, weakness or paresthesia  Psychological: see HPI      Vitals:    03/28/17 0939   BP: (!) 169/102   Pulse: 92   Resp: 14 Temp: 98.5 °F (36.9 °C)   TempSrc: Oral   SpO2: 99%   Weight: 152 lb (68.9 kg)   Height: 5' 6\" (1.676 m)      Body mass index is 24.53 kg/(m^2). Physical Exam:  Constitutional: well developed, well nourished  Head: normocephalic, atraumatic  Eyes: sclera clear, EOMI, PERRL  Neck: normal range of motion  Cardiovascular: regular rate   Respiratory: symmetrical effort  Extremities: full range of motion  Neurology: normal strength   Psych: active, alert and oriented, anxious appropriate       Health Maintenance Due   Topic Date Due    Pneumococcal 19-64 Medium Risk (1 of 1 - PPSV23) 07/26/1999    DTaP/Tdap/Td series (1 - Tdap) 07/26/2001    PAP AKA CERVICAL CYTOLOGY  07/26/2001    EYE EXAM RETINAL OR DILATED Q1  06/03/2014       Risk, benefits and potential costs of recommended health maintenance discussed. Patient expressed understanding and deferred at this time. Assessment and orders:       ICD-10-CM ICD-9-CM    1. Type 2 diabetes mellitus with other specified complication, with long-term current use of insulin (HCC) E11.69  metFORMIN (GLUCOPHAGE) 1,000 mg tablet    U12.0  METABOLIC PANEL, COMPREHENSIVE      LIPID PANEL      HEMOGLOBIN A1C WITH EAG      CBC W/O DIFF      AMB POC URINALYSIS DIP STICK AUTO W/O MICRO      AMB POC URINE, MICROALBUMIN, SEMIQUANT (3 RESULTS)   2. Mixed hyperlipidemia I16.9 089.3 METABOLIC PANEL, COMPREHENSIVE      LIPID PANEL   3. HTN (hypertension), benign I10 401.1 lisinopril-hydroCHLOROthiazide (PRINZIDE, ZESTORETIC) 20-25 mg per tablet      METABOLIC PANEL, COMPREHENSIVE      TSH 3RD GENERATION   4. Anxiety F41.9 300.00    5. Encounter for long term benzodiazepine therapy Z79.899 V58.69 [id number]         Plan of care:    Diagnoses were discussed in detail with patient. Medication risks/benefits/side effects discussed with patient. All of the patient's questions were addressed. The patient understands and agrees with our plan of care.   The patient knows to call back if they are unsure of or forgets any changes we discussed today or if the symptoms change. The patient received an After-Visit Summary which contains VS, diagnoses, orders, allergy and medication lists.       Patient Care Team:  John Belle NP as PCP - General (Nurse Practitioner)  Gisela Bernal MD (Ophthalmology)  Valeriano Nguyen RN as Ambulatory Care Navigator    Follow-up Disposition: Not on File        Signed By: Savanna Michelle MD     March 31, 2017

## 2017-04-02 LAB
AMPHETAMINES UR QL SCN: NEGATIVE NG/ML
BARBITURATES UR QL SCN: NEGATIVE NG/ML
BENZODIAZ UR QL SCN: NEGATIVE NG/ML
BZE UR CFM-MCNC: ABNORMAL NG/ML
BZE UR QL SCN: NORMAL NG/ML
BZE UR QL: POSITIVE
CANNABINOIDS UR QL CFM: POSITIVE
CANNABINOIDS UR QL SCN: NORMAL NG/ML
CREAT UR-MCNC: 95.8 MG/DL (ref 20–300)
METHADONE UR QL SCN: NEGATIVE NG/ML
OPIATES UR QL SCN: NEGATIVE NG/ML
OXYCODONE+OXYMORPHONE UR QL SCN: NEGATIVE NG/ML
PCP UR QL: NEGATIVE NG/ML
PH UR: 7.2 [PH] (ref 4.5–8.9)
PLEASE NOTE:, 733157: NORMAL
PROPOXYPH UR QL SCN: NEGATIVE NG/ML
THC UR CFM-MCNC: 69 NG/ML

## 2017-04-21 ENCOUNTER — OFFICE VISIT (OUTPATIENT)
Dept: FAMILY MEDICINE CLINIC | Age: 37
End: 2017-04-21

## 2017-04-21 VITALS
HEART RATE: 94 BPM | SYSTOLIC BLOOD PRESSURE: 159 MMHG | OXYGEN SATURATION: 99 % | RESPIRATION RATE: 20 BRPM | HEIGHT: 66 IN | DIASTOLIC BLOOD PRESSURE: 102 MMHG | TEMPERATURE: 98.3 F | BODY MASS INDEX: 23.88 KG/M2 | WEIGHT: 148.6 LBS

## 2017-04-21 DIAGNOSIS — R82.5 POSITIVE URINE DRUG SCREEN: Primary | ICD-10-CM

## 2017-04-21 DIAGNOSIS — F41.9 ANXIETY: ICD-10-CM

## 2017-04-21 RX ORDER — DIVALPROEX SODIUM 500 MG/1
500 TABLET, DELAYED RELEASE ORAL 2 TIMES DAILY
Qty: 60 TAB | Refills: 3 | Status: SHIPPED | OUTPATIENT
Start: 2017-04-21 | End: 2021-03-09 | Stop reason: ALTCHOICE

## 2017-04-21 RX ORDER — DIVALPROEX SODIUM 500 MG/1
500 TABLET, DELAYED RELEASE ORAL 2 TIMES DAILY
COMMUNITY
End: 2017-04-21 | Stop reason: SDUPTHER

## 2017-04-21 NOTE — PROGRESS NOTES
Health Maintenance Due   Topic Date Due    Pneumococcal 19-64 Medium Risk (1 of 1 - PPSV23) 07/26/1999    DTaP/Tdap/Td series (1 - Tdap) 07/26/2001    PAP AKA CERVICAL CYTOLOGY  07/26/2001    EYE EXAM RETINAL OR DILATED Q1  06/03/2014

## 2017-04-21 NOTE — MR AVS SNAPSHOT
Visit Information Date & Time Provider Department Dept. Phone Encounter #  
 4/21/2017  2:40 PM Natasha Greene MD [facility] [place] [id number] Upcoming Health Maintenance Date Due Pneumococcal 19-64 Medium Risk (1 of 1 - PPSV23) 7/26/1999 DTaP/Tdap/Td series (1 - Tdap) 7/26/2001 PAP AKA CERVICAL CYTOLOGY 7/26/2001 EYE EXAM RETINAL OR DILATED Q1 6/3/2014 HEMOGLOBIN A1C Q6M 9/28/2017 FOOT EXAM Q1 11/7/2017 MICROALBUMIN Q1 3/28/2018 LIPID PANEL Q1 3/28/2018 Allergies as of 4/21/2017  Review Complete On: 4/21/2017 By: Joan Farah No Known Allergies Current Immunizations  Never Reviewed Name Date Influenza Vaccine (Quad) PF 11/7/2016 Influenza Vaccine PF 1/15/2014 Not reviewed this visit Vitals BP Pulse Temp Resp Height(growth percentile) Weight(growth percentile) (!) 159/102 94 98.3 °F (36.8 °C) (Oral) 20 5' 6\" (1.676 m) 148 lb 9.6 oz (67.4 kg) LMP SpO2 BMI OB Status Smoking Status 12/10/2012 99% 23.98 kg/m2 Hysterectomy Current Every Day Smoker Vitals History BMI and BSA Data Body Mass Index Body Surface Area  
 23.98 kg/m 2 1.77 m 2 Preferred Pharmacy Pharmacy Name Phone [address] [place], [place] - [address] [phone] Your Updated Medication List  
  
   
This list is accurate as of: 4/21/17  3:26 PM.  Always use your most recent med list.  
  
  
  
  
 Blood-Glucose Meter monitoring kit Commonly known as:  [address]  
by Does Not Apply route. Use once daily at different times of day  
  
 clonazePAM 1 mg tablet Commonly known as:  Marcellina Comas Take 1 Tab by mouth three (3) times daily. Max Daily Amount: 3 mg.  
  
 divalproex  mg tablet Commonly known as:  DEPAKOTE Take 1 Tab by mouth two (2) times a day. glucose blood VI test strips strip Commonly known as:  ASCENSIA AUTODISC VI, ONE TOUCH ULTRA TEST VI  
by Does Not Apply route two (2) times a day. Bid  
  
 insulin glargine 100 unit/mL injection Commonly known as:  LANTUS  
15 Units by SubCUTAneous route daily. Lancets Misc E11.9 testing once a day  
  
 lisinopril-hydroCHLOROthiazide 20-25 mg per tablet Commonly known as:  Phyllis Rower Take 1 Tab by mouth daily. metFORMIN 1,000 mg tablet Commonly known as:  GLUCOPHAGE Take 1 Tab by mouth two (2) times daily (with meals). metoprolol succinate 25 mg XL tablet Commonly known as:  TOPROL-XL Take 1 Tab by mouth daily. Indications: PAROXYSMAL SUPRAVENTRICULAR TACHYCARDIA  
  
 traZODone 100 mg tablet Commonly known as:  Destiny Katayama Take 1.5 Tabs by mouth nightly. Prescriptions Sent to Pharmacy Refills  
 divalproex DR (DEPAKOTE) 500 mg tablet 3 Sig: Take 1 Tab by mouth two (2) times a day. Class: Normal  
 Pharmacy: [address] [place] [address] #: [phone] Route: Oral  
  
Introducing [facility]! Carlos Singh introduces Sensoraide patient portal. Now you can access parts of your medical record, email your doctor's office, and request medication refills online. 1. In your internet browser, go to [email or web address] 2. Click on the First Time User? Click Here link in the Sign In box. You will see the New Member Sign Up page. 3. Enter your Sensoraide Access Code exactly as it appears below. You will not need to use this code after youve completed the sign-up process. If you do not sign up before the expiration date, you must request a new code. · S[employer]oraide Access Code: [email or web address] Expires: 5/7/2017  2:05 PM 
 
4. Enter the last four digits of your Social Security Number (xxxx) and Date of Birth (mm/dd/yyyy) as indicated and click Submit. You will be taken to the next sign-up page. 5. Create a Popbasic ID. This will be your Popbasic login ID and cannot be changed, so think of one that is secure and easy to remember. 6. Create a Popbasic password. You can change your password at any time. 7. Enter your Password Reset Question and Answer. This can be used at a later time if you forget your password. 8. Enter your e-mail address. You will receive e-mail notification when new information is available in [address] 9. Click Sign Up. You can now view and download portions of your medical record. 10. Click the Download Summary menu link to download a portable copy of your medical information. If you have questions, please visit the Frequently Asked Questions section of the Popbasic website. Remember, Popbasic is NOT to be used for urgent needs. For medical emergencies, dial 911. Now available from your iPhone and Android! Please provide this summary of care documentation to your next provider. If you have any questions after today's visit, please call [phone].

## 2017-04-26 NOTE — PROGRESS NOTES
Patient request for refill of seroquel. Using more than prescribed dosing and has run out early. Has follow up on 1/25/17. Patient: Isela Snellen MRN: [id number]  SSN: [id number]    YOB: 1980  Age: 39 y.o. Sex: female        Subjective:     Chief Complaint   Patient presents with    Medication Refill       HPI: she is a 39y.o. year old female who presents for follow up. Asks about refill of clonazepam. Recent drug screen positive for cannabis and cocaine. Discharge letter has been sent. Patient informed I would be unable to write for clonazepam. Patient advised to stop all use of illicit drugs. BP Readings from Last 3 Encounters:   04/21/17 (!) 159/102   03/28/17 (!) 169/102   02/06/17 122/85     Wt Readings from Last 3 Encounters:   04/21/17 148 lb 9.6 oz (67.4 kg)   03/28/17 152 lb (68.9 kg)   02/06/17 138 lb 6.4 oz (62.8 kg)     Body mass index is 23.98 kg/(m^2). Encounter Diagnoses   Name Primary?  Positive urine drug screen Yes    Anxiety        Current and past medical information:    Current Medications after this visit[de-identified]     Current Outpatient Prescriptions   Medication Sig    divalproex DR (DEPAKOTE) 500 mg tablet Take 1 Tab by mouth two (2) times a day.  lisinopril-hydroCHLOROthiazide (PRINZIDE, ZESTORETIC) 20-25 mg per tablet Take 1 Tab by mouth daily.  metFORMIN (GLUCOPHAGE) 1,000 mg tablet Take 1 Tab by mouth two (2) times daily (with meals).  traZODone (DESYREL) 100 mg tablet Take 1.5 Tabs by mouth nightly.  clonazePAM (KLONOPIN) 1 mg tablet Take 1 Tab by mouth three (3) times daily. Max Daily Amount: 3 mg.  glucose blood VI test strips (ASCENSIA AUTODISC VI, ONE TOUCH ULTRA TEST VI) strip by Does Not Apply route two (2) times a day. Bid    insulin glargine (LANTUS) 100 unit/mL injection 15 Units by SubCUTAneous route daily.  metoprolol succinate (TOPROL-XL) 25 mg XL tablet Take 1 Tab by mouth daily.  Indications: PAROXYSMAL SUPRAVENTRICULAR TACHYCARDIA    Lancets misc E11.9 testing once a day    Blood-Glucose Meter ([address]) monitoring kit by Does Not Apply route. Use once daily at different times of day     No current facility-administered medications for this visit.         Patient Active Problem List    Diagnosis Date Noted    H/O medication noncompliance 11/23/2016    Toenail fungus 08/07/2013    Emotional lability 05/31/2013    Hyperlipidemia 04/29/2013    Anxiety 04/29/2013    Anemia 03/18/2011    Tobacco abuse 06/21/2010    HTN (hypertension), benign 05/19/2010    Diabetes mellitus ([address] 05/19/2010    Cholesterol serum increased 05/19/2010    Menorrhagia, premenopausal 05/19/2010       Past Medical History:   Diagnosis Date    Cholesterol serum increased 5/19/2010    [facility] 5/19/2010    HTN (hypertension), benign 5/19/2010       No Known Allergies    Past Surgical History:   Procedure Laterality Date    HX GYN      s/p Hysterectomy       Social History     Social History    Marital status: SINGLE     Spouse name: N/A    Number of children: N/A    Years of education: N/A     Social History Main Topics    Smoking status: Current Every Day Smoker     Packs/day: 1.00    Smokeless tobacco: Never Used    Alcohol use No    Drug use: No    Sexual activity: Yes     Other Topics Concern    None     Social History Narrative         Objective:     Review of Systems:  Constitutional: Negative for fatigue or malaise  Cardiovascular: Negative for dizziness, chest pain or palpitations  Respiratory: Negative for cough, wheezing or SOB  Muscoloskeletal: Negative for acute myalgias or arthralgias   Neurological: Negative for headache, weakness or paresthesia  Psychological: see HPI      Vitals:    04/21/17 1455   BP: (!) 159/102   Pulse: 94   Resp: 20   Temp: 98.3 °F (36.8 °C)   TempSrc: Oral   SpO2: 99%   Weight: 148 lb 9.6 oz (67.4 kg)   Height: 5' 6\" (1.676 m)      Body mass index is 23.98 kg/(m^2). Physical Exam:  Constitutional: well developed, well nourished  Head: normocephalic, atraumatic  Eyes: sclera clear, EOMI  Neck: normal range of motion  Cardiovascular: regular rate   Respiratory: symmetrical effort  Extremities: full range of motion  Neurology: normal strength   Psych: active, alert and oriented        Assessment and orders:       ICD-10-CM ICD-9-CM    1. Positive urine drug screen R82.5 796.0    2. Anxiety F41.9 300.00          Plan of care:    Diagnoses were discussed in detail with patient. Medication risks/benefits/side effects discussed with patient. All of the patient's questions were addressed. The patient understands and agrees with our plan of care. The patient knows to call back if they are unsure of or forgets any changes we discussed today or if the symptoms change. The patient received an After-Visit Summary which contains VS, diagnoses, orders, allergy and medication lists.       Patient Care Team:  Daxa Pride MD (Ophthalmology)      Signed By: Kasia Ni MD     April 25, 2017

## 2019-02-08 RX ORDER — OSELTAMIVIR PHOSPHATE 75 MG/1
75 CAPSULE ORAL DAILY
Qty: 5 CAP | Refills: 0 | Status: SHIPPED | OUTPATIENT
Start: 2019-02-08 | End: 2019-02-13

## 2020-11-13 ENCOUNTER — APPOINTMENT (OUTPATIENT)
Dept: GENERAL RADIOLOGY | Age: 40
End: 2020-11-13
Attending: EMERGENCY MEDICINE
Payer: MEDICAID

## 2020-11-13 ENCOUNTER — HOSPITAL ENCOUNTER (EMERGENCY)
Age: 40
Discharge: HOME | End: 2020-11-13
Attending: EMERGENCY MEDICINE
Payer: MEDICAID

## 2020-11-13 VITALS
BODY MASS INDEX: 22.98 KG/M2 | TEMPERATURE: 98.8 F | WEIGHT: 143 LBS | DIASTOLIC BLOOD PRESSURE: 97 MMHG | RESPIRATION RATE: 16 BRPM | HEART RATE: 96 BPM | SYSTOLIC BLOOD PRESSURE: 121 MMHG | OXYGEN SATURATION: 100 % | HEIGHT: 66 IN

## 2020-11-13 DIAGNOSIS — R73.9 HYPERGLYCEMIA: Primary | ICD-10-CM

## 2020-11-13 DIAGNOSIS — R10.84 ABDOMINAL PAIN, GENERALIZED: ICD-10-CM

## 2020-11-13 LAB
ALBUMIN SERPL-MCNC: 4.5 G/DL (ref 3.5–5)
ALBUMIN/GLOB SERPL: 0.9 {RATIO} (ref 1.1–2.2)
ALP SERPL-CCNC: 111 U/L (ref 45–117)
ALT SERPL-CCNC: 23 U/L (ref 12–78)
ANION GAP SERPL CALC-SCNC: 11 MMOL/L (ref 5–15)
AST SERPL W P-5'-P-CCNC: 13 U/L (ref 15–37)
ATRIAL RATE: 127 BPM
B-OH-BUTYR SERPL-SCNC: 1.62 MMOL/L
BASE EXCESS BLDV CALC-SCNC: 1.3 MMOL/L (ref 0–2)
BASOPHILS # BLD: 0 K/UL (ref 0–0.1)
BASOPHILS NFR BLD: 0 % (ref 0–1)
BDY SITE: ABNORMAL
BILIRUB SERPL-MCNC: 0.6 MG/DL (ref 0.2–1)
BUN SERPL-MCNC: 22 MG/DL (ref 6–20)
BUN/CREAT SERPL: 25 (ref 12–20)
CA-I BLD-MCNC: 10 MG/DL (ref 8.5–10.1)
CALCULATED P AXIS, ECG09: 83 DEGREES
CALCULATED R AXIS, ECG10: 85 DEGREES
CALCULATED T AXIS, ECG11: 41 DEGREES
CHLORIDE SERPL-SCNC: 97 MMOL/L (ref 97–108)
CO2 SERPL-SCNC: 25 MMOL/L (ref 21–32)
CREAT SERPL-MCNC: 0.88 MG/DL (ref 0.55–1.02)
DIAGNOSIS, 93000: NORMAL
DIFFERENTIAL METHOD BLD: ABNORMAL
EOSINOPHIL # BLD: 0 K/UL (ref 0–0.4)
EOSINOPHIL NFR BLD: 0 % (ref 0–7)
ERYTHROCYTE [DISTWIDTH] IN BLOOD BY AUTOMATED COUNT: 12.8 % (ref 11.5–14.5)
FIO2 ON VENT: 21 %
GLOBULIN SER CALC-MCNC: 5.2 G/DL (ref 2–4)
GLUCOSE BLD STRIP.AUTO-MCNC: 247 MG/DL (ref 65–100)
GLUCOSE SERPL-MCNC: 241 MG/DL (ref 65–100)
HCO3 BLDV-SCNC: 25 MMOL/L (ref 22–26)
HCT VFR BLD AUTO: 42.1 % (ref 35–47)
HGB BLD-MCNC: 15.2 G/DL (ref 11.5–16)
IMM GRANULOCYTES # BLD AUTO: 0 K/UL (ref 0–0.04)
IMM GRANULOCYTES NFR BLD AUTO: 0 % (ref 0–0.5)
LIPASE SERPL-CCNC: 77 U/L (ref 73–393)
LYMPHOCYTES # BLD: 1.2 K/UL (ref 0.8–3.5)
LYMPHOCYTES NFR BLD: 9 % (ref 12–49)
MCH RBC QN AUTO: 30.5 PG (ref 26–34)
MCHC RBC AUTO-ENTMCNC: 36.1 G/DL (ref 30–36.5)
MCV RBC AUTO: 84.4 FL (ref 80–99)
MONOCYTES # BLD: 0.5 K/UL (ref 0–1)
MONOCYTES NFR BLD: 4 % (ref 5–13)
NEUTS SEG # BLD: 11.1 K/UL (ref 1.8–8)
NEUTS SEG NFR BLD: 87 % (ref 32–75)
P-R INTERVAL, ECG05: 134 MS
PCO2 BLDV: 40.3 MMHG (ref 40–50)
PERFORMED BY, TECHID: ABNORMAL
PH BLDV: 7.42 [PH] (ref 7.31–7.41)
PLATELET # BLD AUTO: 364 K/UL (ref 150–400)
PMV BLD AUTO: 10.2 FL (ref 8.9–12.9)
PO2 BLDV: 50 MMHG (ref 36–42)
POTASSIUM SERPL-SCNC: 3.5 MMOL/L (ref 3.5–5.1)
PROT SERPL-MCNC: 9.7 G/DL (ref 6.4–8.2)
Q-T INTERVAL, ECG07: 318 MS
QRS DURATION, ECG06: 80 MS
QTC CALCULATION (BEZET), ECG08: 462 MS
RBC # BLD AUTO: 4.99 M/UL (ref 3.8–5.2)
SAO2 % BLDV: 88 % (ref 60–80)
SAO2% DEVICE SAO2% SENSOR NAME: ABNORMAL
SODIUM SERPL-SCNC: 133 MMOL/L (ref 136–145)
TROPONIN I SERPL-MCNC: <0.05 NG/ML
VENTRICULAR RATE, ECG03: 127 BPM
WBC # BLD AUTO: 12.8 K/UL (ref 3.6–11)

## 2020-11-13 PROCEDURE — 82803 BLOOD GASES ANY COMBINATION: CPT

## 2020-11-13 PROCEDURE — 82010 KETONE BODYS QUAN: CPT

## 2020-11-13 PROCEDURE — 96374 THER/PROPH/DIAG INJ IV PUSH: CPT

## 2020-11-13 PROCEDURE — 80053 COMPREHEN METABOLIC PANEL: CPT

## 2020-11-13 PROCEDURE — 74011250636 HC RX REV CODE- 250/636: Performed by: EMERGENCY MEDICINE

## 2020-11-13 PROCEDURE — 93005 ELECTROCARDIOGRAM TRACING: CPT

## 2020-11-13 PROCEDURE — 96375 TX/PRO/DX INJ NEW DRUG ADDON: CPT

## 2020-11-13 PROCEDURE — 85025 COMPLETE CBC W/AUTO DIFF WBC: CPT

## 2020-11-13 PROCEDURE — 84484 ASSAY OF TROPONIN QUANT: CPT

## 2020-11-13 PROCEDURE — 99284 EMERGENCY DEPT VISIT MOD MDM: CPT

## 2020-11-13 PROCEDURE — 36415 COLL VENOUS BLD VENIPUNCTURE: CPT

## 2020-11-13 PROCEDURE — 71045 X-RAY EXAM CHEST 1 VIEW: CPT

## 2020-11-13 PROCEDURE — 83690 ASSAY OF LIPASE: CPT

## 2020-11-13 PROCEDURE — 82962 GLUCOSE BLOOD TEST: CPT

## 2020-11-13 RX ORDER — MORPHINE SULFATE 4 MG/ML
4 INJECTION INTRAVENOUS ONCE
Status: COMPLETED | OUTPATIENT
Start: 2020-11-13 | End: 2020-11-13

## 2020-11-13 RX ORDER — ONDANSETRON 2 MG/ML
4 INJECTION INTRAMUSCULAR; INTRAVENOUS
Status: COMPLETED | OUTPATIENT
Start: 2020-11-13 | End: 2020-11-13

## 2020-11-13 RX ORDER — ONDANSETRON 4 MG/1
4 TABLET, ORALLY DISINTEGRATING ORAL
Qty: 12 TAB | Refills: 0 | Status: SHIPPED | OUTPATIENT
Start: 2020-11-13 | End: 2020-11-18

## 2020-11-13 RX ADMIN — ONDANSETRON 4 MG: 2 INJECTION INTRAMUSCULAR; INTRAVENOUS at 10:03

## 2020-11-13 RX ADMIN — MORPHINE SULFATE 4 MG: 4 INJECTION INTRAVENOUS at 10:02

## 2020-11-13 RX ADMIN — SODIUM CHLORIDE 1000 ML: 9 INJECTION, SOLUTION INTRAVENOUS at 10:03

## 2020-11-13 RX ADMIN — SODIUM CHLORIDE 1000 ML: 9 INJECTION, SOLUTION INTRAVENOUS at 12:30

## 2020-11-13 NOTE — ED PROVIDER NOTES
EMERGENCY DEPARTMENT HISTORY AND PHYSICAL EXAM        Date: 11/13/2020  Patient Name: Polina Pendleton    History of Presenting Illness     Chief Complaint   Patient presents with    Vomiting    Abdominal Pain       History Provided By: Patient    HPI: Polina Pendleton, 36 y.o. female with past medical history of hypertension, diabetes, hyperlipidemia who presents with abdominal pain and chest pain. Symptoms started yesterday. She noticed chest pain first while she was at rest.  She is also had upper abdominal pain. Pain is constant, nonradiating, worse with eating or drinking. She has not tried over-the-counter medications. States that last time she had the symptoms she was in DKA. PCP: Rory, MD Cecilia    Current Outpatient Medications   Medication Sig Dispense Refill    divalproex DR (DEPAKOTE) 500 mg tablet Take 1 Tab by mouth two (2) times a day. 60 Tab 3    lisinopril-hydroCHLOROthiazide (PRINZIDE, ZESTORETIC) 20-25 mg per tablet Take 1 Tab by mouth daily. 30 Tab 3    metFORMIN (GLUCOPHAGE) 1,000 mg tablet Take 1 Tab by mouth two (2) times daily (with meals). 60 Tab 3    traZODone (DESYREL) 100 mg tablet Take 1.5 Tabs by mouth nightly. 45 Tab 0    clonazePAM (KLONOPIN) 1 mg tablet Take 1 Tab by mouth three (3) times daily. Max Daily Amount: 3 mg. 90 Tab 0    glucose blood VI test strips (ASCENSIA AUTODISC VI, ONE TOUCH ULTRA TEST VI) strip by Does Not Apply route two (2) times a day. Bid 180 Strip 3    insulin glargine (LANTUS) 100 unit/mL injection 15 Units by SubCUTAneous route daily. 3 Vial 2    metoprolol succinate (TOPROL-XL) 25 mg XL tablet Take 1 Tab by mouth daily. Indications: PAROXYSMAL SUPRAVENTRICULAR TACHYCARDIA 30 Tab 2    Lancets misc E11.9 testing once a day 50 Each 5    Blood-Glucose Meter ([address]) monitoring kit by Does Not Apply route.  Use once daily at different times of day 1 Kit prn       Past History     Past Medical History:  Past Medical History: Diagnosis Date    Cholesterol serum increased 5/19/2010    Diabetes mellitus ([facility] 5/19/2010    HTN (hypertension), benign 5/19/2010       Past Surgical History:  Past Surgical History:   Procedure Laterality Date    HX GYN      s/p Hysterectomy       Family History:  Family History   Problem Relation Age of Onset    Diabetes Mother     Cancer Father     Alcohol abuse Neg Hx     Arthritis-rheumatoid Neg Hx     Asthma Neg Hx     Bleeding Prob Neg Hx     Elevated Lipids Neg Hx     Headache Neg Hx     Heart Disease Neg Hx     Hypertension Neg Hx     Lung Disease Neg Hx     Migraines Neg Hx     Psychiatric Disorder Neg Hx     Stroke Neg Hx     Mental Retardation Neg Hx        Social History:  Social History     Tobacco Use    Smoking status: Current Every Day Smoker     Packs/day: 1.00    Smokeless tobacco: Never Used   Substance Use Topics    Alcohol use: No    Drug use: No       Allergies:  No Known Allergies    Review of Systems   Review of Systems   Constitutional: Negative for fever. HENT: Negative for congestion. Eyes: Negative for visual disturbance. Respiratory: Negative for shortness of breath. Cardiovascular: Positive for chest pain. Gastrointestinal: Positive for abdominal pain, nausea and vomiting. Genitourinary: Negative for dysuria. Musculoskeletal: Negative for arthralgias. Skin: Negative for rash. Neurological: Negative for headaches. Physical Exam   General: No acute distress. Well-nourished. Skin: No rash. Head: Normocephalic. Atraumatic. Eye: No proptosis or conjunctival injections. Respiratory: No apparent respiratory distress. Gastrointestinal: Nondistended. No abdominal tenderness. Musculoskeletal: No obvious bony deformities. Psychiatric: Cooperative. Appropriate mood and affect.     Diagnostic Study Results     Labs -     Recent Results (from the past 24 hour(s))   GLUCOSE, POC    Collection Time: 11/13/20  9:04 AM   Result Value Ref Range Glucose (POC) 247 (H) 65 - 100 mg/dL    Performed by iVki Riley    EKG, 12 LEAD, INITIAL    Collection Time: 11/13/20  9:08 AM   Result Value Ref Range    Ventricular Rate 127 BPM    Atrial Rate 127 BPM    P-R Interval 134 ms    QRS Duration 80 ms    Q-T Interval 318 ms    QTC Calculation (Bezet) 462 ms    Calculated P Axis 83 degrees    Calculated R Axis 85 degrees    Calculated T Axis 41 degrees    Diagnosis       Sinus tachycardia  Biatrial enlargement  Abnormal ECG  When compared with ECG of 30-NOV-2012 10:02,  Vent. rate has increased BY  42 BPM  Confirmed by Mickie Lopez ([id number]) on 11/13/2020 26:58:70 AM     METABOLIC PANEL, COMPREHENSIVE    Collection Time: 11/13/20  9:45 AM   Result Value Ref Range    Sodium 133 (L) 136 - 145 mmol/L    Potassium 3.5 3.5 - 5.1 mmol/L    Chloride 97 97 - 108 mmol/L    CO2 25 21 - 32 mmol/L    Anion gap 11 5 - 15 mmol/L    Glucose 241 (H) 65 - 100 mg/dL    BUN 22 (H) 6 - 20 mg/dL    Creatinine 0.88 0.55 - 1.02 mg/dL    BUN/Creatinine ratio 25 (H) 12 - 20      GFR est AA >60 >60 ml/min/1.73m2    GFR est non-AA >60 >60 ml/min/1.73m2    Calcium 10.0 8.5 - 10.1 mg/dL    Bilirubin, total 0.6 0.2 - 1.0 mg/dL    AST (SGOT) 13 (L) 15 - 37 U/L    ALT (SGPT) 23 12 - 78 U/L    Alk.  phosphatase 111 45 - 117 U/L    Protein, total 9.7 (H) 6.4 - 8.2 g/dL    Albumin 4.5 3.5 - 5.0 g/dL    Globulin 5.2 (H) 2.0 - 4.0 g/dL    A-G Ratio 0.9 (L) 1.1 - 2.2     CBC WITH AUTOMATED DIFF    Collection Time: 11/13/20  9:45 AM   Result Value Ref Range    WBC 12.8 (H) 3.6 - 11.0 K/uL    RBC 4.99 3.80 - 5.20 M/uL    HGB 15.2 11.5 - 16.0 g/dL    HCT 42.1 35.0 - 47.0 %    MCV 84.4 80.0 - 99.0 FL    MCH 30.5 26.0 - 34.0 PG    MCHC 36.1 30.0 - 36.5 g/dL    RDW 12.8 11.5 - 14.5 %    PLATELET 846 633 - 929 K/uL    MPV 10.2 8.9 - 12.9 FL    NEUTROPHILS 87 (H) 32 - 75 %    LYMPHOCYTES 9 (L) 12 - 49 %    MONOCYTES 4 (L) 5 - 13 %    EOSINOPHILS 0 0 - 7 %    BASOPHILS 0 0 - 1 %    IMMATURE GRANULOCYTES 0 0.0 - 0.5 %    ABS. NEUTROPHILS 11.1 (H) 1.8 - 8.0 K/UL    ABS. LYMPHOCYTES 1.2 0.8 - 3.5 K/UL    ABS. MONOCYTES 0.5 0.0 - 1.0 K/UL    ABS. EOSINOPHILS 0.0 0.0 - 0.4 K/UL    ABS. BASOPHILS 0.0 0.0 - 0.1 K/UL    ABS. IMM. GRANS. 0.0 0.00 - 0.04 K/UL    DF AUTOMATED     VENOUS BLOOD GAS    Collection Time: 11/13/20  9:45 AM   Result Value Ref Range    VENOUS PH 7.418 (H) 7.31 - 7.41      VENOUS PCO2 40.3 40 - 50 mmHg    VENOUS PO2 50 (H) 36 - 42 mmHg    VENOUS O2 SATURATION 88 (H) 60 - 80 %    VENOUS BICARBONATE 25 22 - 26 mmol/L    VENOUS BASE EXCESS 1.3 0 - 2 mmol/L    O2 METHOD Room air      FIO2 21.0 %    SITE Left Radial     LIPASE    Collection Time: 11/13/20  9:45 AM   Result Value Ref Range    Lipase 77 73 - 393 U/L   TROPONIN I    Collection Time: 11/13/20  9:45 AM   Result Value Ref Range    Troponin-I, Qt. <0.05 <0.05 ng/mL       Radiologic Studies -   XR CHEST SNGL V   Final Result   Impression: No acute findings. CT Results  (Last 48 hours)    None        CXR Results  (Last 48 hours)               11/13/20 1004  XR CHEST SNGL V Final result    Impression:  Impression: No acute findings. Narrative:  Chest pain. No comparison. Findings: Frontal single view chest demonstrates normal cardiomediastinal   silhouette. No vascular congestion or pulmonary edema. The lungs are   well-inflated. No infiltrate, effusion, pneumothorax. No free air under the   hemidiaphragms. Bones grossly intact. Medical Decision Making and ED Course     I reviewed the available vital signs, nursing notes, past medical history, past surgical history, family history, and social history. Vital Signs - Reviewed the patient's vital signs. Patient Vitals for the past 12 hrs:   Temp Pulse Resp BP SpO2   11/13/20 0905 98.8 °F (37.1 °C) (!) 127 18 (!) 143/84 100 %       EKG interpretation: Sinus tachycardia at 127 bpm.  Normal OK interval, QRS duration, QTc interval.  No ST segment abnormalities. Normal axis. Medical Decision Making:   Presented with abdominal pain. The  differential diagnosis is hyperglycemia, dehydration, electrolyte abnormality, DKA, gastritis, gastroenteritis, ACS. Work-up is negative except for mild leukocytosis and mild hyperglycemia. Given IV fluids which helped with tachycardia. Patient symptomatically improved with morphine and Zofran. Feel no need for any further testing such as CT scan given her abdomen is not tender and she feels very well now. No signs of DKA. Patient reassured and discharged in good condition. Disposition     Discharged    DISCHARGE PLAN:  1. Current Discharge Medication List      CONTINUE these medications which have NOT CHANGED    Details   divalproex DR (DEPAKOTE) 500 mg tablet Take 1 Tab by mouth two (2) times a day. Qty: 60 Tab, Refills: 3      lisinopril-hydroCHLOROthiazide (PRINZIDE, ZESTORETIC) 20-25 mg per tablet Take 1 Tab by mouth daily. Qty: 30 Tab, Refills: 3    Associated Diagnoses: HTN (hypertension), benign      metFORMIN (GLUCOPHAGE) 1,000 mg tablet Take 1 Tab by mouth two (2) times daily (with meals). Qty: 60 Tab, Refills: 3    Associated Diagnoses: Type 2 diabetes mellitus with other specified complication, with long-term current use of insulin ([facility])      traZODone (DESYREL) 100 mg tablet Take 1.5 Tabs by mouth nightly. Qty: 45 Tab, Refills: 0      clonazePAM (KLONOPIN) 1 mg tablet Take 1 Tab by mouth three (3) times daily. Max Daily Amount: 3 mg. Qty: 90 Tab, Refills: 0      glucose blood VI test strips (ASCENSIA AUTODISC VI, ONE TOUCH ULTRA TEST VI) strip by Does Not Apply route two (2) times a day. Bid  Qty: 180 Strip, Refills: 3    Associated Diagnoses: Type 2 diabetes mellitus with other specified complication, with long-term current use of insulin ([facility])      insulin glargine (LANTUS) 100 unit/mL injection 15 Units by SubCUTAneous route daily.   Qty: 3 Vial, Refills: 2    Associated Diagnoses: Type 2 diabetes mellitus with other specified complication, with long-term current use of insulin ([facility])      metoprolol succinate (TOPROL-XL) 25 mg XL tablet Take 1 Tab by mouth daily. Indications: PAROXYSMAL SUPRAVENTRICULAR TACHYCARDIA  Qty: 30 Tab, Refills: 2    Associated Diagnoses: PSVT (paroxysmal supraventricular tachycardia) ([facility])      Lancets misc E11.9 testing once a day  Qty: 50 Each, Refills: 5    Associated Diagnoses: Controlled type 2 diabetes mellitus without complication, without long-term current use of insulin ([facility])      Blood-Glucose Meter ([address]) monitoring kit by Does Not Apply route. Use once daily at different times of day  Qty: 1 Kit, Refills: prn    Associated Diagnoses: Type II or unspecified type diabetes mellitus without mention of complication, not stated as uncontrolled; Diabetic peripheral vascular disorder ([facility] Hypertension; Hyperlipidemia           2. Follow-up Information    None       3. Return to ED if worse     Diagnosis     Clinical impression:   1. Hyperglycemia    2. Abdominal pain, generalized         Attestation:  Please note that this dictation was completed with [employer], the computer voice recognition software. Quite often unanticipated grammatical, syntax, homophones, and other interpretive errors are inadvertently transcribed by the computer software. Please disregard these errors. Please excuse any errors that have escaped final proofreading. Thank you.   Lala Reyes, DO

## 2020-11-13 NOTE — ED TRIAGE NOTES
Around 0100 this morning patient states abdominal pain started with vomiting hx dm and htn bs 247 in triage

## 2020-11-14 ENCOUNTER — HOSPITAL ENCOUNTER (OUTPATIENT)
Age: 40
Setting detail: OBSERVATION
Discharge: HOME | End: 2020-11-16
Attending: EMERGENCY MEDICINE | Admitting: INTERNAL MEDICINE
Payer: MEDICAID

## 2020-11-14 ENCOUNTER — APPOINTMENT (OUTPATIENT)
Dept: ULTRASOUND IMAGING | Age: 40
End: 2020-11-14
Attending: INTERNAL MEDICINE
Payer: MEDICAID

## 2020-11-14 ENCOUNTER — APPOINTMENT (OUTPATIENT)
Dept: CT IMAGING | Age: 40
End: 2020-11-14
Attending: INTERNAL MEDICINE
Payer: MEDICAID

## 2020-11-14 DIAGNOSIS — R00.0 TACHYCARDIA: Primary | ICD-10-CM

## 2020-11-14 DIAGNOSIS — I47.1 PSVT (PAROXYSMAL SUPRAVENTRICULAR TACHYCARDIA) (HCC): ICD-10-CM

## 2020-11-14 DIAGNOSIS — R07.89 ATYPICAL CHEST PAIN: ICD-10-CM

## 2020-11-14 PROBLEM — R11.2 NAUSEA AND VOMITING: Status: ACTIVE | Noted: 2020-11-14

## 2020-11-14 PROBLEM — E86.0 DEHYDRATION: Status: ACTIVE | Noted: 2020-11-14

## 2020-11-14 PROBLEM — R11.2 NAUSEA & VOMITING: Status: ACTIVE | Noted: 2020-11-14

## 2020-11-14 PROBLEM — R10.11 RIGHT UPPER QUADRANT PAIN: Status: ACTIVE | Noted: 2020-11-14

## 2020-11-14 PROBLEM — K59.00 CONSTIPATION: Chronic | Status: ACTIVE | Noted: 2020-11-14

## 2020-11-14 LAB
ALBUMIN SERPL-MCNC: 4 G/DL (ref 3.5–5)
ALBUMIN/GLOB SERPL: 0.9 {RATIO} (ref 1.1–2.2)
ALP SERPL-CCNC: 94 U/L (ref 45–117)
ALT SERPL-CCNC: 18 U/L (ref 12–78)
ANION GAP SERPL CALC-SCNC: 8 MMOL/L (ref 5–15)
APPEARANCE UR: ABNORMAL
AST SERPL W P-5'-P-CCNC: 14 U/L (ref 15–37)
ATRIAL RATE: 103 BPM
BACTERIA URNS QL MICRO: ABNORMAL /HPF
BASOPHILS # BLD: 0 K/UL (ref 0–0.1)
BASOPHILS NFR BLD: 0 % (ref 0–1)
BILIRUB SERPL-MCNC: 0.7 MG/DL (ref 0.2–1)
BILIRUB UR QL: NEGATIVE
BNP SERPL-MCNC: 101 PG/ML
BUN SERPL-MCNC: 16 MG/DL (ref 6–20)
BUN/CREAT SERPL: 26 (ref 12–20)
CA-I BLD-MCNC: 9 MG/DL (ref 8.5–10.1)
CALCULATED P AXIS, ECG09: 84 DEGREES
CALCULATED R AXIS, ECG10: 87 DEGREES
CALCULATED T AXIS, ECG11: 31 DEGREES
CHLORIDE SERPL-SCNC: 105 MMOL/L (ref 97–108)
CO2 SERPL-SCNC: 25 MMOL/L (ref 21–32)
COLOR UR: ABNORMAL
CREAT SERPL-MCNC: 0.61 MG/DL (ref 0.55–1.02)
D DIMER PPP FEU-MCNC: 0.44 UG/ML(FEU)
DIAGNOSIS, 93000: NORMAL
DIFFERENTIAL METHOD BLD: ABNORMAL
EOSINOPHIL # BLD: 0 K/UL (ref 0–0.4)
EOSINOPHIL NFR BLD: 0 % (ref 0–7)
EPITH CASTS URNS QL MICRO: ABNORMAL /LPF
ERYTHROCYTE [DISTWIDTH] IN BLOOD BY AUTOMATED COUNT: 12.7 % (ref 11.5–14.5)
GLOBULIN SER CALC-MCNC: 4.4 G/DL (ref 2–4)
GLUCOSE BLD STRIP.AUTO-MCNC: 107 MG/DL (ref 65–100)
GLUCOSE BLD STRIP.AUTO-MCNC: 134 MG/DL (ref 65–100)
GLUCOSE BLD STRIP.AUTO-MCNC: 147 MG/DL (ref 65–100)
GLUCOSE SERPL-MCNC: 176 MG/DL (ref 65–100)
GLUCOSE UR STRIP.AUTO-MCNC: >300 MG/DL
HCG SERPL QL: NEGATIVE
HCT VFR BLD AUTO: 40.2 % (ref 35–47)
HGB BLD-MCNC: 14.3 G/DL (ref 11.5–16)
HGB UR QL STRIP: ABNORMAL
IMM GRANULOCYTES # BLD AUTO: 0 K/UL (ref 0–0.04)
IMM GRANULOCYTES NFR BLD AUTO: 0 % (ref 0–0.5)
KETONES UR QL STRIP.AUTO: 80 MG/DL
LEUKOCYTE ESTERASE UR QL STRIP.AUTO: ABNORMAL
LIPASE SERPL-CCNC: 193 U/L (ref 73–393)
LYMPHOCYTES # BLD: 1.6 K/UL (ref 0.8–3.5)
LYMPHOCYTES NFR BLD: 13 % (ref 12–49)
MAGNESIUM SERPL-MCNC: 2.2 MG/DL (ref 1.6–2.4)
MCH RBC QN AUTO: 30 PG (ref 26–34)
MCHC RBC AUTO-ENTMCNC: 35.6 G/DL (ref 30–36.5)
MCV RBC AUTO: 84.3 FL (ref 80–99)
MONOCYTES # BLD: 0.8 K/UL (ref 0–1)
MONOCYTES NFR BLD: 7 % (ref 5–13)
MUCOUS THREADS URNS QL MICRO: ABNORMAL /LPF
NEUTS SEG # BLD: 9.6 K/UL (ref 1.8–8)
NEUTS SEG NFR BLD: 80 % (ref 32–75)
NITRITE UR QL STRIP.AUTO: NEGATIVE
P-R INTERVAL, ECG05: 132 MS
PERFORMED BY, TECHID: ABNORMAL
PH UR STRIP: 6 [PH] (ref 5–8)
PLATELET # BLD AUTO: 368 K/UL (ref 150–400)
PMV BLD AUTO: 10.1 FL (ref 8.9–12.9)
POTASSIUM SERPL-SCNC: 3.5 MMOL/L (ref 3.5–5.1)
PROT SERPL-MCNC: 8.4 G/DL (ref 6.4–8.2)
PROT UR STRIP-MCNC: NEGATIVE MG/DL
Q-T INTERVAL, ECG07: 338 MS
QRS DURATION, ECG06: 84 MS
QTC CALCULATION (BEZET), ECG08: 442 MS
RBC # BLD AUTO: 4.77 M/UL (ref 3.8–5.2)
RBC #/AREA URNS HPF: ABNORMAL /HPF (ref 0–5)
SODIUM SERPL-SCNC: 138 MMOL/L (ref 136–145)
SP GR UR REFRACTOMETRY: 1.01 (ref 1–1.03)
TROPONIN I SERPL-MCNC: <0.05 NG/ML
TSH SERPL DL<=0.05 MIU/L-ACNC: 0.7 UIU/ML (ref 0.36–3.74)
UA: UC IF INDICATED,UAUC: ABNORMAL
UROBILINOGEN UR QL STRIP.AUTO: 0.1 EU/DL (ref 0.1–1)
VENTRICULAR RATE, ECG03: 103 BPM
WBC # BLD AUTO: 12 K/UL (ref 3.6–11)
WBC URNS QL MICRO: ABNORMAL /HPF (ref 0–4)
YEAST URNS QL MICRO: PRESENT

## 2020-11-14 PROCEDURE — 96372 THER/PROPH/DIAG INJ SC/IM: CPT

## 2020-11-14 PROCEDURE — 87186 SC STD MICRODIL/AGAR DIL: CPT

## 2020-11-14 PROCEDURE — 84484 ASSAY OF TROPONIN QUANT: CPT

## 2020-11-14 PROCEDURE — 83520 IMMUNOASSAY QUANT NOS NONAB: CPT

## 2020-11-14 PROCEDURE — 71275 CT ANGIOGRAPHY CHEST: CPT

## 2020-11-14 PROCEDURE — 74011250636 HC RX REV CODE- 250/636: Performed by: INTERNAL MEDICINE

## 2020-11-14 PROCEDURE — 96376 TX/PRO/DX INJ SAME DRUG ADON: CPT

## 2020-11-14 PROCEDURE — 74011000636 HC RX REV CODE- 636: Performed by: INTERNAL MEDICINE

## 2020-11-14 PROCEDURE — 84443 ASSAY THYROID STIM HORMONE: CPT

## 2020-11-14 PROCEDURE — 83735 ASSAY OF MAGNESIUM: CPT

## 2020-11-14 PROCEDURE — 83880 ASSAY OF NATRIURETIC PEPTIDE: CPT

## 2020-11-14 PROCEDURE — 99218 HC RM OBSERVATION: CPT

## 2020-11-14 PROCEDURE — 74011250637 HC RX REV CODE- 250/637: Performed by: INTERNAL MEDICINE

## 2020-11-14 PROCEDURE — 80061 LIPID PANEL: CPT

## 2020-11-14 PROCEDURE — 93005 ELECTROCARDIOGRAM TRACING: CPT

## 2020-11-14 PROCEDURE — 96361 HYDRATE IV INFUSION ADD-ON: CPT

## 2020-11-14 PROCEDURE — 83690 ASSAY OF LIPASE: CPT

## 2020-11-14 PROCEDURE — 87077 CULTURE AEROBIC IDENTIFY: CPT

## 2020-11-14 PROCEDURE — 74011250636 HC RX REV CODE- 250/636: Performed by: EMERGENCY MEDICINE

## 2020-11-14 PROCEDURE — 84703 CHORIONIC GONADOTROPIN ASSAY: CPT

## 2020-11-14 PROCEDURE — 80053 COMPREHEN METABOLIC PANEL: CPT

## 2020-11-14 PROCEDURE — 96374 THER/PROPH/DIAG INJ IV PUSH: CPT

## 2020-11-14 PROCEDURE — 85379 FIBRIN DEGRADATION QUANT: CPT

## 2020-11-14 PROCEDURE — 82962 GLUCOSE BLOOD TEST: CPT

## 2020-11-14 PROCEDURE — 99285 EMERGENCY DEPT VISIT HI MDM: CPT

## 2020-11-14 PROCEDURE — 96375 TX/PRO/DX INJ NEW DRUG ADDON: CPT

## 2020-11-14 PROCEDURE — 85025 COMPLETE CBC W/AUTO DIFF WBC: CPT

## 2020-11-14 PROCEDURE — 81001 URINALYSIS AUTO W/SCOPE: CPT

## 2020-11-14 PROCEDURE — 36415 COLL VENOUS BLD VENIPUNCTURE: CPT

## 2020-11-14 PROCEDURE — 76705 ECHO EXAM OF ABDOMEN: CPT

## 2020-11-14 PROCEDURE — 87086 URINE CULTURE/COLONY COUNT: CPT

## 2020-11-14 RX ORDER — SODIUM CHLORIDE AND POTASSIUM CHLORIDE .9; .15 G/100ML; G/100ML
SOLUTION INTRAVENOUS CONTINUOUS
Status: DISCONTINUED | OUTPATIENT
Start: 2020-11-14 | End: 2020-11-16

## 2020-11-14 RX ORDER — ENOXAPARIN SODIUM 100 MG/ML
40 INJECTION SUBCUTANEOUS DAILY
Status: DISCONTINUED | OUTPATIENT
Start: 2020-11-14 | End: 2020-11-17 | Stop reason: HOSPADM

## 2020-11-14 RX ORDER — DEXTROSE 50 % IN WATER (D50W) INTRAVENOUS SYRINGE
25-50 AS NEEDED
Status: DISCONTINUED | OUTPATIENT
Start: 2020-11-14 | End: 2020-11-17 | Stop reason: HOSPADM

## 2020-11-14 RX ORDER — POLYETHYLENE GLYCOL 3350 17 G/17G
17 POWDER, FOR SOLUTION ORAL DAILY
Status: DISCONTINUED | OUTPATIENT
Start: 2020-11-14 | End: 2020-11-17 | Stop reason: HOSPADM

## 2020-11-14 RX ORDER — MAGNESIUM SULFATE 100 %
4 CRYSTALS MISCELLANEOUS AS NEEDED
Status: DISCONTINUED | OUTPATIENT
Start: 2020-11-14 | End: 2020-11-17 | Stop reason: HOSPADM

## 2020-11-14 RX ORDER — INSULIN GLARGINE 100 [IU]/ML
40 INJECTION, SOLUTION SUBCUTANEOUS DAILY
Status: CANCELLED | OUTPATIENT
Start: 2020-11-15

## 2020-11-14 RX ORDER — SODIUM CHLORIDE 0.9 % (FLUSH) 0.9 %
5-40 SYRINGE (ML) INJECTION AS NEEDED
Status: DISCONTINUED | OUTPATIENT
Start: 2020-11-14 | End: 2020-11-17 | Stop reason: HOSPADM

## 2020-11-14 RX ORDER — DIPHENHYDRAMINE HYDROCHLORIDE 50 MG/ML
50 INJECTION, SOLUTION INTRAMUSCULAR; INTRAVENOUS
Status: COMPLETED | OUTPATIENT
Start: 2020-11-14 | End: 2020-11-14

## 2020-11-14 RX ORDER — ACETAMINOPHEN 650 MG/1
650 SUPPOSITORY RECTAL
Status: DISCONTINUED | OUTPATIENT
Start: 2020-11-14 | End: 2020-11-17 | Stop reason: HOSPADM

## 2020-11-14 RX ORDER — LISINOPRIL 20 MG/1
20 TABLET ORAL DAILY
Status: DISCONTINUED | OUTPATIENT
Start: 2020-11-15 | End: 2020-11-17 | Stop reason: HOSPADM

## 2020-11-14 RX ORDER — INSULIN LISPRO 100 [IU]/ML
INJECTION, SOLUTION INTRAVENOUS; SUBCUTANEOUS
Status: DISCONTINUED | OUTPATIENT
Start: 2020-11-14 | End: 2020-11-17 | Stop reason: HOSPADM

## 2020-11-14 RX ORDER — ONDANSETRON 2 MG/ML
4 INJECTION INTRAMUSCULAR; INTRAVENOUS
Status: COMPLETED | OUTPATIENT
Start: 2020-11-14 | End: 2020-11-14

## 2020-11-14 RX ORDER — SODIUM CHLORIDE 0.9 % (FLUSH) 0.9 %
5-40 SYRINGE (ML) INJECTION EVERY 8 HOURS
Status: DISCONTINUED | OUTPATIENT
Start: 2020-11-14 | End: 2020-11-17 | Stop reason: HOSPADM

## 2020-11-14 RX ORDER — METOCLOPRAMIDE HYDROCHLORIDE 5 MG/ML
5 INJECTION INTRAMUSCULAR; INTRAVENOUS
Status: COMPLETED | OUTPATIENT
Start: 2020-11-14 | End: 2020-11-14

## 2020-11-14 RX ORDER — ONDANSETRON 2 MG/ML
4 INJECTION INTRAMUSCULAR; INTRAVENOUS
Status: DISCONTINUED | OUTPATIENT
Start: 2020-11-14 | End: 2020-11-17 | Stop reason: HOSPADM

## 2020-11-14 RX ORDER — KETOROLAC TROMETHAMINE 30 MG/ML
15 INJECTION, SOLUTION INTRAMUSCULAR; INTRAVENOUS
Status: DISCONTINUED | OUTPATIENT
Start: 2020-11-14 | End: 2020-11-17 | Stop reason: HOSPADM

## 2020-11-14 RX ORDER — ASPIRIN 325 MG
325 TABLET ORAL DAILY
Status: DISCONTINUED | OUTPATIENT
Start: 2020-11-14 | End: 2020-11-17 | Stop reason: HOSPADM

## 2020-11-14 RX ORDER — PANTOPRAZOLE SODIUM 40 MG/1
40 TABLET, DELAYED RELEASE ORAL
Status: DISCONTINUED | OUTPATIENT
Start: 2020-11-14 | End: 2020-11-17 | Stop reason: HOSPADM

## 2020-11-14 RX ORDER — METOPROLOL TARTRATE 50 MG/1
50 TABLET ORAL EVERY 12 HOURS
Status: DISCONTINUED | OUTPATIENT
Start: 2020-11-14 | End: 2020-11-17 | Stop reason: HOSPADM

## 2020-11-14 RX ORDER — PROMETHAZINE HYDROCHLORIDE 25 MG/1
12.5 TABLET ORAL
Status: DISCONTINUED | OUTPATIENT
Start: 2020-11-14 | End: 2020-11-17 | Stop reason: HOSPADM

## 2020-11-14 RX ORDER — DICYCLOMINE HYDROCHLORIDE 10 MG/ML
10 INJECTION INTRAMUSCULAR
Status: COMPLETED | OUTPATIENT
Start: 2020-11-14 | End: 2020-11-14

## 2020-11-14 RX ORDER — ACETAMINOPHEN 325 MG/1
650 TABLET ORAL
Status: DISCONTINUED | OUTPATIENT
Start: 2020-11-14 | End: 2020-11-17 | Stop reason: HOSPADM

## 2020-11-14 RX ORDER — ATORVASTATIN CALCIUM 10 MG/1
10 TABLET, FILM COATED ORAL
Status: DISCONTINUED | OUTPATIENT
Start: 2020-11-14 | End: 2020-11-17 | Stop reason: HOSPADM

## 2020-11-14 RX ORDER — HYDRALAZINE HYDROCHLORIDE 10 MG/1
10 TABLET, FILM COATED ORAL
Status: DISCONTINUED | OUTPATIENT
Start: 2020-11-14 | End: 2020-11-17 | Stop reason: HOSPADM

## 2020-11-14 RX ADMIN — Medication 10 ML: at 20:09

## 2020-11-14 RX ADMIN — SODIUM CHLORIDE 1000 ML: 9 INJECTION, SOLUTION INTRAVENOUS at 03:59

## 2020-11-14 RX ADMIN — ATORVASTATIN CALCIUM 10 MG: 10 TABLET, FILM COATED ORAL at 20:07

## 2020-11-14 RX ADMIN — SODIUM CHLORIDE 1000 ML: 9 INJECTION, SOLUTION INTRAVENOUS at 05:42

## 2020-11-14 RX ADMIN — IOPAMIDOL 100 ML: 755 INJECTION, SOLUTION INTRAVENOUS at 10:20

## 2020-11-14 RX ADMIN — DICYCLOMINE HYDROCHLORIDE 10 MG: 20 INJECTION, SOLUTION INTRAMUSCULAR at 05:00

## 2020-11-14 RX ADMIN — POLYETHYLENE GLYCOL 3350 17 G: 17 POWDER, FOR SOLUTION ORAL at 10:46

## 2020-11-14 RX ADMIN — POTASSIUM CHLORIDE AND SODIUM CHLORIDE: 900; 150 INJECTION, SOLUTION INTRAVENOUS at 12:59

## 2020-11-14 RX ADMIN — ACETAMINOPHEN 650 MG: 325 TABLET, FILM COATED ORAL at 12:58

## 2020-11-14 RX ADMIN — METOCLOPRAMIDE HYDROCHLORIDE 5 MG: 5 INJECTION INTRAMUSCULAR; INTRAVENOUS at 05:00

## 2020-11-14 RX ADMIN — DIPHENHYDRAMINE HYDROCHLORIDE 50 MG: 50 INJECTION, SOLUTION INTRAMUSCULAR; INTRAVENOUS at 05:00

## 2020-11-14 RX ADMIN — ONDANSETRON 4 MG: 2 INJECTION INTRAMUSCULAR; INTRAVENOUS at 12:58

## 2020-11-14 RX ADMIN — METOPROLOL TARTRATE 50 MG: 50 TABLET, FILM COATED ORAL at 20:07

## 2020-11-14 RX ADMIN — ASPIRIN 325 MG ORAL TABLET 325 MG: 325 PILL ORAL at 12:58

## 2020-11-14 RX ADMIN — METOPROLOL TARTRATE 50 MG: 50 TABLET, FILM COATED ORAL at 12:59

## 2020-11-14 RX ADMIN — ENOXAPARIN SODIUM 40 MG: 40 INJECTION SUBCUTANEOUS at 12:59

## 2020-11-14 RX ADMIN — Medication 10 ML: at 09:54

## 2020-11-14 RX ADMIN — ONDANSETRON 4 MG: 2 INJECTION INTRAMUSCULAR; INTRAVENOUS at 20:07

## 2020-11-14 RX ADMIN — PANTOPRAZOLE SODIUM 40 MG: 40 TABLET, DELAYED RELEASE ORAL at 12:58

## 2020-11-14 RX ADMIN — ONDANSETRON 4 MG: 2 INJECTION INTRAMUSCULAR; INTRAVENOUS at 03:58

## 2020-11-14 NOTE — ED TRIAGE NOTES
Pt states was discharge yesterday afternoon after 2 but is having palpitations again was dx with dx of dehydrations due to vomiting.

## 2020-11-14 NOTE — ED PROVIDER NOTES
EMERGENCY DEPARTMENT HISTORY AND PHYSICAL EXAM      Date: 11/14/2020  Patient Name: Narendra Gutierrez      History of Presenting Illness     Chief Complaint   Patient presents with    Palpitations       History Provided By: Patient and Chart review    HPI: Narendra Gutierrez, 36 y.o. female with a past medical history significant Hypertension, diabetes presents to the ED with cc of 59-year-old female returning to the emergency department after recent discharge from the ED for nausea vomiting and palpitations. She states she felt better at discharge however around midnight she began again with palpitations nausea and vomiting. As well as diffuse abdominal discomfort. No diarrhea no blood in her stool. She has felt nauseous but no vomiting. No cough no fevers no chest pain. Yesterday had labs which were unremarkable. There are no other complaints, changes, or physical findings at this time. PCP: Cecilia Valadez MD    Current Outpatient Medications   Medication Sig Dispense Refill    ondansetron (Zofran ODT) 4 mg disintegrating tablet Take 1 Tab by mouth every eight (8) hours as needed for Nausea or Vomiting for up to 5 days. 12 Tab 0    divalproex DR (DEPAKOTE) 500 mg tablet Take 1 Tab by mouth two (2) times a day. 60 Tab 3    lisinopril-hydroCHLOROthiazide (PRINZIDE, ZESTORETIC) 20-25 mg per tablet Take 1 Tab by mouth daily. 30 Tab 3    metFORMIN (GLUCOPHAGE) 1,000 mg tablet Take 1 Tab by mouth two (2) times daily (with meals). 60 Tab 3    traZODone (DESYREL) 100 mg tablet Take 1.5 Tabs by mouth nightly. 45 Tab 0    clonazePAM (KLONOPIN) 1 mg tablet Take 1 Tab by mouth three (3) times daily. Max Daily Amount: 3 mg. 90 Tab 0    glucose blood VI test strips (ASCENSIA AUTODISC VI, ONE TOUCH ULTRA TEST VI) strip by Does Not Apply route two (2) times a day. Bid 180 Strip 3    insulin glargine (LANTUS) 100 unit/mL injection 15 Units by SubCUTAneous route daily.  3 Vial 2    metoprolol succinate (TOPROL-XL) 25 mg XL tablet Take 1 Tab by mouth daily. Indications: PAROXYSMAL SUPRAVENTRICULAR TACHYCARDIA 30 Tab 2    Lancets misc E11.9 testing once a day 50 Each 5    Blood-Glucose Meter ([address]) monitoring kit by Does Not Apply route. Use once daily at different times of day 1 Kit prn       Past History     Past Medical History:  Past Medical History:   Diagnosis Date    Cholesterol serum increased 5/19/2010    Diabetes mellitus ([address] 5/19/2010    HTN (hypertension), benign 5/19/2010       Past Surgical History:  Past Surgical History:   Procedure Laterality Date    HX GYN      s/p Hysterectomy       Family History:  Family History   Problem Relation Age of Onset    Diabetes Mother     Cancer Father     Alcohol abuse Neg Hx     Arthritis-rheumatoid Neg Hx     Asthma Neg Hx     Bleeding Prob Neg Hx     Elevated Lipids Neg Hx     Headache Neg Hx     Heart Disease Neg Hx     Hypertension Neg Hx     Lung Disease Neg Hx     Migraines Neg Hx     Psychiatric Disorder Neg Hx     Stroke Neg Hx     Mental Retardation Neg Hx        Social History:  Social History     Tobacco Use    Smoking status: Current Every Day Smoker     Packs/day: 1.00    Smokeless tobacco: Never Used   Substance Use Topics    Alcohol use: No    Drug use: No       Allergies:  No Known Allergies      Review of Systems     Review of Systems   Constitutional: Positive for activity change, appetite change and fatigue. Negative for chills and fever. HENT: Negative for congestion. Respiratory: Positive for chest tightness and shortness of breath. Negative for cough. Cardiovascular: Positive for chest pain. Negative for palpitations. Gastrointestinal: Positive for abdominal pain and nausea. Negative for vomiting. Genitourinary: Negative for difficulty urinating. Musculoskeletal: Negative for arthralgias and myalgias. Skin: Negative for color change, rash and wound.    Neurological: Negative for dizziness, syncope, numbness and headaches. Psychiatric/Behavioral: Negative for confusion. Physical Exam   *  Physical Exam  Vitals signs and nursing note reviewed. Constitutional:       General: She is not in acute distress. Appearance: Normal appearance. She is not ill-appearing. HENT:      Head: Normocephalic and atraumatic. Nose: Nose normal.      Mouth/Throat:      Mouth: Mucous membranes are moist.   Eyes:      Pupils: Pupils are equal, round, and reactive to light. Neck:      Musculoskeletal: Normal range of motion and neck supple. Cardiovascular:      Rate and Rhythm: Regular rhythm. Tachycardia present. Pulmonary:      Effort: Pulmonary effort is normal.      Breath sounds: Normal breath sounds. Abdominal:      General: Abdomen is flat. Bowel sounds are normal.      Palpations: Abdomen is soft. Tenderness: There is abdominal tenderness. Comments: Abdominal discomfort to palpation without guarding or rebound abdomen soft   Musculoskeletal: Normal range of motion. Skin:     General: Skin is warm and dry. Capillary Refill: Capillary refill takes less than 2 seconds. Neurological:      General: No focal deficit present. Mental Status: She is alert and oriented to person, place, and time.    Psychiatric:         Mood and Affect: Mood normal.         Lab and Diagnostic Study Results     Labs -     Recent Results (from the past 12 hour(s))   TROPONIN I    Collection Time: 11/14/20  3:45 AM   Result Value Ref Range    Troponin-I, Qt. <0.05 <0.05 ng/mL   BNP    Collection Time: 11/14/20  3:45 AM   Result Value Ref Range    NT pro- <125 pg/mL   CBC WITH AUTOMATED DIFF    Collection Time: 11/14/20  3:45 AM   Result Value Ref Range    WBC 12.0 (H) 3.6 - 11.0 K/uL    RBC 4.77 3.80 - 5.20 M/uL    HGB 14.3 11.5 - 16.0 g/dL    HCT 40.2 35.0 - 47.0 %    MCV 84.3 80.0 - 99.0 FL    MCH 30.0 26.0 - 34.0 PG    MCHC 35.6 30.0 - 36.5 g/dL    RDW 12.7 11.5 - 14.5 %    PLATELET 927 150 - 400 K/uL    MPV 10.1 8.9 - 12.9 FL    NEUTROPHILS 80 (H) 32 - 75 %    LYMPHOCYTES 13 12 - 49 %    MONOCYTES 7 5 - 13 %    EOSINOPHILS 0 0 - 7 %    BASOPHILS 0 0 - 1 %    IMMATURE GRANULOCYTES 0 0.0 - 0.5 %    ABS. NEUTROPHILS 9.6 (H) 1.8 - 8.0 K/UL    ABS. LYMPHOCYTES 1.6 0.8 - 3.5 K/UL    ABS. MONOCYTES 0.8 0.0 - 1.0 K/UL    ABS. EOSINOPHILS 0.0 0.0 - 0.4 K/UL    ABS. BASOPHILS 0.0 0.0 - 0.1 K/UL    ABS. IMM. GRANS. 0.0 0.00 - 0.04 K/UL    DF AUTOMATED     METABOLIC PANEL, COMPREHENSIVE    Collection Time: 11/14/20  3:45 AM   Result Value Ref Range    Sodium 138 136 - 145 mmol/L    Potassium 3.5 3.5 - 5.1 mmol/L    Chloride 105 97 - 108 mmol/L    CO2 25 21 - 32 mmol/L    Anion gap 8 5 - 15 mmol/L    Glucose 176 (H) 65 - 100 mg/dL    BUN 16 6 - 20 mg/dL    Creatinine 0.61 0.55 - 1.02 mg/dL    BUN/Creatinine ratio 26 (H) 12 - 20      GFR est AA >60 >60 ml/min/1.73m2    GFR est non-AA >60 >60 ml/min/1.73m2    Calcium 9.0 8.5 - 10.1 mg/dL    Bilirubin, total 0.7 0.2 - 1.0 mg/dL    AST (SGOT) 14 (L) 15 - 37 U/L    ALT (SGPT) 18 12 - 78 U/L    Alk. phosphatase 94 45 - 117 U/L    Protein, total 8.4 (H) 6.4 - 8.2 g/dL    Albumin 4.0 3.5 - 5.0 g/dL    Globulin 4.4 (H) 2.0 - 4.0 g/dL    A-G Ratio 0.9 (L) 1.1 - 2.2     MAGNESIUM    Collection Time: 11/14/20  3:45 AM   Result Value Ref Range    Magnesium 2.2 1.6 - 2.4 mg/dL   D DIMER    Collection Time: 11/14/20  3:45 AM   Result Value Ref Range    D DIMER 0.44 <0.50 ug/ml(FEU)       Radiologic Studies -   [unfilled]  CT Results  (Last 48 hours)    None        CXR Results  (Last 48 hours)               11/13/20 1004  XR CHEST SNGL V Final result    Impression:  Impression: No acute findings. Narrative:  Chest pain. No comparison. Findings: Frontal single view chest demonstrates normal cardiomediastinal   silhouette. No vascular congestion or pulmonary edema. The lungs are   well-inflated. No infiltrate, effusion, pneumothorax.  No free air under the   hemidiaphragms. Bones grossly intact. Medical Decision Making and ED Course   - I am the first and primary provider for this patient. - I reviewed the vital signs, available nursing notes, past medical history, past surgical history, family history and social history. - Initial assessment performed. The patients presenting problems have been discussed, and the staff are in agreement with the care plan formulated and outlined with them. I have encouraged them to ask questions as they arise throughout their visit. Vital Signs-Reviewed the patient's vital signs. Patient Vitals for the past 12 hrs:   Temp Pulse Resp BP SpO2   11/14/20 0636 -- (!) 111 19 (!) 165/99 100 %   11/14/20 0505 -- (!) 101 18 (!) 147/89 100 %   11/14/20 0336 97.9 °F (36.6 °C) (!) 123 20 (!) 153/93 100 %       EKG interpretation: (Preliminary):       14 November 2020 zero 335.0 339 sinus tachycardia rate of 1 3 bpm.  QTc 442 ms. No STEMI no ectopy.  ms. Records Reviewed: Nursing Notes and Old Medical Records      ED Course:        Provider Notes (Medical Decision Making):   Patient is a 20-year-old female presenting to the emergency department with palpitations nausea and vomiting. She was recently here for the same and diagnosis was dehydration nausea and vomiting. Her lab work here is actually improved however she is still significantly tachycardic. She is low risk by Wells criteria for PE but is persistently tachycardic after 2 L of IV fluids. Her D-dimer is negative as well. She still complaining of tightness in her chest.  Will admit for observation given persistent tachycardia. Atypical chest pain. MDM           Consultations:       Consultations: Discussed with hospitalist Dr. Sachi Stephens  Will admit         Disposition     Disposition:   Admission        Diagnosis     Clinical Impression:   1. Tachycardia    2.  Atypical chest pain        Attestations:    Iza Sanders MD    Please note that this dictation was completed with [employer]fish, the [employer] voice recognition software. Quite often unanticipated grammatical, syntax, homophones, and other interpretive errors are inadvertently transcribed by the computer software. Please disregard these errors. Please excuse any errors that have escaped final proofreading. Thank you.
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
## 2020-11-14 NOTE — ROUTINE PROCESS
TRANSFER - OUT REPORT:    Verbal report given to kaeal on Marcia Rondon  being transferred to [facility] for routine progression of care       Report consisted of patients Situation, Background, Assessment and   Recommendations(SBAR). Information from the following report(s) SBAR was reviewed with the receiving nurse. Lines:   Peripheral IV 11/14/20 Right Antecubital (Active)   Site Assessment Clean, dry, & intact 11/14/20 0402   Phlebitis Assessment 0 11/14/20 0402   Infiltration Assessment 0 11/14/20 0402   Dressing Status Clean, dry, & intact 11/14/20 0402   Hub Color/Line Status Flushed;Pink;Patent 11/14/20 0402        Opportunity for questions and clarification was provided.       Patient transported with:   olook

## 2020-11-15 LAB
ANION GAP SERPL CALC-SCNC: 7 MMOL/L (ref 5–15)
BUN SERPL-MCNC: 11 MG/DL (ref 6–20)
BUN/CREAT SERPL: 20 (ref 12–20)
CA-I BLD-MCNC: 8 MG/DL (ref 8.5–10.1)
CHLORIDE SERPL-SCNC: 104 MMOL/L (ref 97–108)
CHOLEST SERPL-MCNC: 134 MG/DL
CO2 SERPL-SCNC: 23 MMOL/L (ref 21–32)
CREAT SERPL-MCNC: 0.54 MG/DL (ref 0.55–1.02)
ERYTHROCYTE [DISTWIDTH] IN BLOOD BY AUTOMATED COUNT: 12.5 % (ref 11.5–14.5)
GLUCOSE BLD STRIP.AUTO-MCNC: 104 MG/DL (ref 65–100)
GLUCOSE BLD STRIP.AUTO-MCNC: 184 MG/DL (ref 65–100)
GLUCOSE SERPL-MCNC: 176 MG/DL (ref 65–100)
HCT VFR BLD AUTO: 38.8 % (ref 35–47)
HDLC SERPL-MCNC: 49 MG/DL
HDLC SERPL: 2.7 {RATIO} (ref 0–5)
HGB BLD-MCNC: 13.5 G/DL (ref 11.5–16)
LDLC SERPL CALC-MCNC: 68 MG/DL (ref 0–100)
LIPID PROFILE,FLP: NORMAL
MCH RBC QN AUTO: 29.5 PG (ref 26–34)
MCHC RBC AUTO-ENTMCNC: 34.8 G/DL (ref 30–36.5)
MCV RBC AUTO: 84.7 FL (ref 80–99)
PERFORMED BY, TECHID: ABNORMAL
PERFORMED BY, TECHID: ABNORMAL
PLATELET # BLD AUTO: 352 K/UL (ref 150–400)
PMV BLD AUTO: 10.2 FL (ref 8.9–12.9)
POTASSIUM SERPL-SCNC: 3.3 MMOL/L (ref 3.5–5.1)
RBC # BLD AUTO: 4.58 M/UL (ref 3.8–5.2)
SODIUM SERPL-SCNC: 134 MMOL/L (ref 136–145)
TRIGL SERPL-MCNC: 85 MG/DL (ref ?–150)
VLDLC SERPL CALC-MCNC: 17 MG/DL
WBC # BLD AUTO: 9.5 K/UL (ref 3.6–11)

## 2020-11-15 PROCEDURE — 96375 TX/PRO/DX INJ NEW DRUG ADDON: CPT

## 2020-11-15 PROCEDURE — 74011636637 HC RX REV CODE- 636/637: Performed by: INTERNAL MEDICINE

## 2020-11-15 PROCEDURE — 74011250636 HC RX REV CODE- 250/636: Performed by: INTERNAL MEDICINE

## 2020-11-15 PROCEDURE — 74011250637 HC RX REV CODE- 250/637: Performed by: INTERNAL MEDICINE

## 2020-11-15 PROCEDURE — 36415 COLL VENOUS BLD VENIPUNCTURE: CPT

## 2020-11-15 PROCEDURE — 99218 HC RM OBSERVATION: CPT

## 2020-11-15 PROCEDURE — 82962 GLUCOSE BLOOD TEST: CPT

## 2020-11-15 PROCEDURE — 80048 BASIC METABOLIC PNL TOTAL CA: CPT

## 2020-11-15 PROCEDURE — 96376 TX/PRO/DX INJ SAME DRUG ADON: CPT

## 2020-11-15 PROCEDURE — 85027 COMPLETE CBC AUTOMATED: CPT

## 2020-11-15 RX ORDER — POTASSIUM CHLORIDE 750 MG/1
40 TABLET, FILM COATED, EXTENDED RELEASE ORAL
Status: COMPLETED | OUTPATIENT
Start: 2020-11-15 | End: 2020-11-15

## 2020-11-15 RX ADMIN — POLYETHYLENE GLYCOL 3350 17 G: 17 POWDER, FOR SOLUTION ORAL at 08:54

## 2020-11-15 RX ADMIN — ONDANSETRON 4 MG: 2 INJECTION INTRAMUSCULAR; INTRAVENOUS at 04:03

## 2020-11-15 RX ADMIN — ONDANSETRON 4 MG: 2 INJECTION INTRAMUSCULAR; INTRAVENOUS at 19:15

## 2020-11-15 RX ADMIN — METOPROLOL TARTRATE 50 MG: 50 TABLET, FILM COATED ORAL at 08:54

## 2020-11-15 RX ADMIN — METOPROLOL TARTRATE 50 MG: 50 TABLET, FILM COATED ORAL at 20:24

## 2020-11-15 RX ADMIN — INSULIN LISPRO 2 UNITS: 100 INJECTION, SOLUTION INTRAVENOUS; SUBCUTANEOUS at 20:31

## 2020-11-15 RX ADMIN — Medication 10 ML: at 04:03

## 2020-11-15 RX ADMIN — POTASSIUM CHLORIDE AND SODIUM CHLORIDE: 900; 150 INJECTION, SOLUTION INTRAVENOUS at 20:33

## 2020-11-15 RX ADMIN — ATORVASTATIN CALCIUM 10 MG: 10 TABLET, FILM COATED ORAL at 20:24

## 2020-11-15 RX ADMIN — KETOROLAC TROMETHAMINE 15 MG: 30 INJECTION, SOLUTION INTRAMUSCULAR at 04:03

## 2020-11-15 RX ADMIN — LISINOPRIL 20 MG: 20 TABLET ORAL at 08:54

## 2020-11-15 RX ADMIN — ASPIRIN 325 MG ORAL TABLET 325 MG: 325 PILL ORAL at 08:54

## 2020-11-15 RX ADMIN — POTASSIUM CHLORIDE 40 MEQ: 750 TABLET, FILM COATED, EXTENDED RELEASE ORAL at 20:24

## 2020-11-15 RX ADMIN — PANTOPRAZOLE SODIUM 40 MG: 40 TABLET, DELAYED RELEASE ORAL at 04:03

## 2020-11-15 RX ADMIN — ONDANSETRON 4 MG: 2 INJECTION INTRAMUSCULAR; INTRAVENOUS at 12:59

## 2020-11-15 RX ADMIN — Medication 10 ML: at 14:00

## 2020-11-15 RX ADMIN — Medication 10 ML: at 20:33

## 2020-11-15 NOTE — PROGRESS NOTES
Problem: Falls - Risk of  Goal: *Absence of Falls  Description: Document Manisha Brownichert Fall Risk and appropriate interventions in the flowsheet.   Outcome: Progressing Towards Goal  Note: Fall Risk Interventions:            Medication Interventions: Patient to call before getting OOB                   Problem: Nausea/Vomiting (Adult)  Goal: *Absence of nausea/vomiting  Outcome: Progressing Towards Goal

## 2020-11-15 NOTE — PROGRESS NOTES
Hospitalist Progress Note               Daily Progress Note: 11/15/2020      Subjective: The patient is seen for follow  up. She c/o chest tightness but is improving. No sob. Tolerating full liquids but reluctant to eat 2/2 nausea though improving.   Trop neg x 3  cta no pe or cp process    Problem List:  Problem List as of 11/15/2020 Date Reviewed: 4/25/2017          Codes Class Noted - Resolved    * (Principal) Chest pain, atypical ICD-10-CM: R07.89  ICD-9-CM: 786.59  11/14/2020 - Present        Sinus tachycardia ICD-10-CM: R00.0  ICD-9-CM: 427.89  11/14/2020 - Present        Dehydration ICD-10-CM: E86.0  ICD-9-CM: 276.51  11/14/2020 - Present        Right upper quadrant pain ICD-10-CM: R10.11  ICD-9-CM: 789.01  11/14/2020 - Present        Nausea and vomiting ICD-10-CM: R11.2  ICD-9-CM: 787.01  11/14/2020 - Present        Constipation (Chronic) ICD-10-CM: K59.00  ICD-9-CM: 564.00  11/14/2020 - Present        Nausea & vomiting ICD-10-CM: R11.2  ICD-9-CM: 787.01  11/14/2020 - Present        H/O medication noncompliance ICD-10-CM: Z91.14  ICD-9-CM: V15.81  11/23/2016 - Present        Toenail fungus ICD-10-CM: B35.1  ICD-9-CM: 110.1  8/7/2013 - Present        Emotional lability ICD-10-CM: R45.86  ICD-9-CM: 799.24  5/31/2013 - Present        Hyperlipidemia ICD-10-CM: E78.5  ICD-9-CM: 272.4  4/29/2013 - Present        Anxiety ICD-10-CM: F41.9  ICD-9-CM: 300.00  4/29/2013 - Present        Anemia ICD-10-CM: D64.9  ICD-9-CM: 285.9  3/18/2011 - Present        Tobacco abuse ICD-10-CM: Z72.0  ICD-9-CM: 305.1  6/21/2010 - Present        HTN (hypertension), benign ICD-10-CM: I10  ICD-9-CM: 401.1  5/19/2010 - Present        Diabetes mellitus ([address] ICD-10-CM: E11.9  ICD-9-CM: 250.00  5/19/2010 - Present        Cholesterol serum increased ICD-9-CM: 272.9  5/19/2010 - Present        Menorrhagia, premenopausal ICD-10-CM: N92.4  ICD-9-CM: 627.0  5/19/2010 - Present              Medications reviewed  Current Facility-Administered Medications   Medication Dose Route Frequency    potassium chloride SR (KLOR-CON 10) tablet 40 mEq  40 mEq Oral NOW    sodium chloride (NS) flush 5-40 mL  5-40 mL IntraVENous Q8H    sodium chloride (NS) flush 5-40 mL  5-40 mL IntraVENous PRN    acetaminophen (TYLENOL) tablet 650 mg  650 mg Oral Q6H PRN    Or    acetaminophen (TYLENOL) suppository 650 mg  650 mg Rectal Q6H PRN    polyethylene glycol (MIRALAX) packet 17 g  17 g Oral DAILY    promethazine (PHENERGAN) tablet 12.5 mg  12.5 mg Oral Q6H PRN    Or    ondansetron (ZOFRAN) injection 4 mg  4 mg IntraVENous Q6H PRN    enoxaparin (LOVENOX) injection 40 mg  40 mg SubCUTAneous DAILY    aspirin tablet 325 mg  325 mg Oral DAILY    atorvastatin (LIPITOR) tablet 10 mg  10 mg Oral QHS    pantoprazole (PROTONIX) tablet 40 mg  40 mg Oral ACB    ketorolac (TORADOL) injection 15 mg  15 mg IntraVENous Q6H PRN    0.9% sodium chloride with KCl 20 mEq/L infusion   IntraVENous CONTINUOUS    metoprolol tartrate (LOPRESSOR) tablet 50 mg  50 mg Oral Q12H    lisinopriL (PRINIVIL, ZESTRIL) tablet 20 mg  20 mg Oral DAILY    insulin lispro (HUMALOG) injection   SubCUTAneous AC&HS    glucose chewable tablet 16 g  4 Tab Oral PRN    glucagon (GLUCAGEN) injection 1 mg  1 mg IntraMUSCular PRN    dextrose (D50W) injection syrg 12.5-25 g  25-50 mL IntraVENous PRN    hydrALAZINE (APRESOLINE) tablet 10 mg  10 mg Oral QID PRN       Review of Systems:   Review of Systems   Constitutional: Positive for malaise/fatigue. Negative for chills, diaphoresis, fever and weight loss. HENT: Negative. Eyes: Negative. Respiratory: Negative for sputum production. Cardiovascular: Positive for chest pain. Negative for palpitations, orthopnea, claudication, leg swelling and PND. Gastrointestinal: Positive for heartburn and nausea. Negative for abdominal pain, diarrhea and vomiting. Genitourinary: Negative. Musculoskeletal: Positive for myalgias. Skin: Negative. Neurological: Negative. Endo/Heme/Allergies: Negative. Psychiatric/Behavioral: Negative. Objective:   Physical Exam:     Visit Vitals  /87   Pulse 80   Temp 98 °F (36.7 °C)   Resp 16   Ht 5' 6\" (1.676 m)   Wt 64.9 kg (143 lb)   LMP 12/10/2012   SpO2 100%   Breastfeeding No   BMI 23.08 kg/m²      O2 Device: Room air    Temp (24hrs), Av.3 °F (36.8 °C), Min:98 °F (36.7 °C), Max:98.5 °F (36.9 °C)    No intake/output data recorded.  1901 - 11/15 0700  In: 1000 [I.V.:1000]  Out: -     General:  Alert, cooperative, no distress, appears stated age. Head:  Normocephalic, without obvious abnormality, atraumatic. Eyes:  Conjunctivae/corneas clear. PERRL, EOMs intact. Nose: Nares normal. Septum midline. Mucosa normal. No drainage or sinus tenderness. Throat: Dry oral mucosa   Neck: Supple, symmetrical, trachea midline, no adenopathy, thyroid: no enlargement/tenderness/nodules, no carotid bruit and no JVD. Back:   Symmetric, no curvature. ROM normal. No CVA tenderness. Lungs:   Clear to auscultation bilaterally. Chest wall:  No tenderness or deformity. Heart:  Regular tachycardia, S1, S2 normal, no murmur, click, rub or gallop. Abdomen:   Soft, +ruq/epigastric tender with deeper palp, no r/g,. Bowel sounds normal. No masses,  No organomegaly. Extremities: Extremities normal, atraumatic, no cyanosis or edema. Pulses: 2+ and symmetric all extremities. Skin: Skin color, texture, turgor normal. No rashes or lesions   Neurologic: CNII-XII intact. No motor or sensory deficits.        Data Review:       Recent Days:  Recent Labs     11/15/20  0921 11/14/20  0345 11/13/20  0945   WBC 9.5 12.0* 12.8*   HGB 13.5 14.3 15.2   HCT 38.8 40.2 42.1    368 364     Recent Labs     11/15/20  0921 11/14/20  0345 11/13/20  0945   * 138 133*   K 3.3* 3.5 3.5    105 97   CO2 23 25 25   * 176* 241*   BUN 11 16 22*   CREA 0.54* 0.61 0.88   CA 8.0* 9.0 10.0   MG  --  2.2  --    ALB  --  4.0 4.5   TBILI  --  0.7 0.6   ALT  --  18 23     Recent Labs     11/13/20  0945   FIO2 21.0       24 Hour Results:  Recent Results (from the past 24 hour(s))   TROPONIN I    Collection Time: 11/14/20  7:08 PM   Result Value Ref Range    Troponin-I, Qt. <0.05 <0.05 ng/mL   GLUCOSE, POC    Collection Time: 11/14/20  8:02 PM   Result Value Ref Range    Glucose (POC) 147 (H) 65 - 100 mg/dL    Performed by [address] [place] [address], [facility]    Collection Time: 11/15/20  9:21 AM   Result Value Ref Range    Sodium 134 (L) 136 - 145 mmol/L    Potassium 3.3 (L) 3.5 - 5.1 mmol/L    Chloride 104 97 - 108 mmol/L    CO2 23 21 - 32 mmol/L    Anion gap 7 5 - 15 mmol/L    Glucose 176 (H) 65 - 100 mg/dL    BUN 11 6 - 20 mg/dL    Creatinine 0.54 (L) 0.55 - 1.02 mg/dL    BUN/Creatinine ratio 20 12 - 20      GFR est AA >60 >60 ml/min/1.73m2    GFR est non-AA >60 >60 ml/min/1.73m2    Calcium 8.0 (L) 8.5 - 10.1 mg/dL   CBC W/O DIFF    Collection Time: 11/15/20  9:21 AM   Result Value Ref Range    WBC 9.5 3.6 - 11.0 K/uL    RBC 4.58 3.80 - 5.20 M/uL    HGB 13.5 11.5 - 16.0 g/dL    HCT 38.8 35.0 - 47.0 %    MCV 84.7 80.0 - 99.0 FL    MCH 29.5 26.0 - 34.0 PG    MCHC 34.8 30.0 - 36.5 g/dL    RDW 12.5 11.5 - 14.5 %    PLATELET 946 731 - 990 K/uL    MPV 10.2 8.9 - 12.9 FL   GLUCOSE, POC    Collection Time: 11/15/20  3:19 PM   Result Value Ref Range    Glucose (POC) 104 (H) 65 - 100 mg/dL    [facility]            Assessment/     Patient Active Problem List   Diagnosis Code    HTN (hypertension), benign I10    Diabetes mellitus ([facility] [address] E11.9    Cholesterol serum increased     Menorrhagia, premenopausal N92.4    Tobacco abuse Z72.0    Anemia D64.9    Hyperlipidemia E78.5    Anxiety F41.9    Emotional lability R45.86    Toenail fungus B35.1    H/O medication noncompliance Z91.14    Chest pain, atypical R07.89    Sinus tachycardia R00.0    Dehydration E86.0    Right upper quadrant pain R10.11    Nausea and vomiting R11.2    Constipation K59.00    Nausea & vomiting R11.2         --Atypical chest pain, trops neg for acs. CTA unrevealing. Us ruq wnl. Viral?  --Sinus tachycardia, suspect dehydration, improved, still tachy this am, settled thru the day  --Vomiting, right upper quadrant and epigastric discomfort,? Gastroenteritis? GB disease? ruq us wnl. Nausea better, tolerating fluids. Marijuana/clinical vomiting this seems less likely  --Essential hypertension, improved trend/adequate  --Insulin treated diabetes mellitus type 2, trended better today. --Hyperlipidemia  --Constipation  --D-dimer marginally elevated, cta neg for pe  --Bipolar/depression stable  --Tobacco abuse/marijuana abuse  --vit d def hx 2016, did not continue supplements, perhaps contributing.   --CTA chest remarks on likely hepatic hemangioimas- op follow up. --HyperK repleting  Plan:  Thought likely home by the end of the day but states not ready. Replace lytes, follow mag. Advance diet   Mobilize  Vit d oh 22 and op follow up    Anticipate home in Am. Care Plan discussed with: Patient/Family Nurse    Total time spent with patient: 30 minutes.     Sathya Huff MD

## 2020-11-16 ENCOUNTER — APPOINTMENT (OUTPATIENT)
Dept: GENERAL RADIOLOGY | Age: 40
End: 2020-11-16
Attending: INTERNAL MEDICINE
Payer: MEDICAID

## 2020-11-16 VITALS
DIASTOLIC BLOOD PRESSURE: 93 MMHG | TEMPERATURE: 98.6 F | HEIGHT: 66 IN | RESPIRATION RATE: 17 BRPM | HEART RATE: 85 BPM | BODY MASS INDEX: 22.98 KG/M2 | OXYGEN SATURATION: 100 % | WEIGHT: 143 LBS | SYSTOLIC BLOOD PRESSURE: 164 MMHG

## 2020-11-16 PROBLEM — N39.0 UTI (URINARY TRACT INFECTION): Status: ACTIVE | Noted: 2020-11-16

## 2020-11-16 PROBLEM — R07.89 CHEST PAIN, ATYPICAL: Status: RESOLVED | Noted: 2020-11-14 | Resolved: 2020-11-16

## 2020-11-16 PROBLEM — R11.2 NAUSEA AND VOMITING: Status: RESOLVED | Noted: 2020-11-14 | Resolved: 2020-11-16

## 2020-11-16 PROBLEM — R10.11 RIGHT UPPER QUADRANT PAIN: Status: RESOLVED | Noted: 2020-11-14 | Resolved: 2020-11-16

## 2020-11-16 PROBLEM — K52.9 GASTROENTERITIS: Status: ACTIVE | Noted: 2020-11-16

## 2020-11-16 PROBLEM — R00.0 SINUS TACHYCARDIA: Status: RESOLVED | Noted: 2020-11-14 | Resolved: 2020-11-16

## 2020-11-16 PROBLEM — E55.9 VITAMIN D DEFICIENCY: Status: ACTIVE | Noted: 2020-11-16

## 2020-11-16 PROBLEM — K52.9 GASTROENTERITIS: Status: RESOLVED | Noted: 2020-11-16 | Resolved: 2020-11-16

## 2020-11-16 PROBLEM — R11.2 NAUSEA & VOMITING: Status: RESOLVED | Noted: 2020-11-14 | Resolved: 2020-11-16

## 2020-11-16 PROBLEM — E86.0 DEHYDRATION: Status: RESOLVED | Noted: 2020-11-14 | Resolved: 2020-11-16

## 2020-11-16 LAB
25(OH)D3 SERPL-MCNC: 23.2 NG/ML (ref 30–100)
ANION GAP SERPL CALC-SCNC: 5 MMOL/L (ref 5–15)
ATRIAL RATE: 118 BPM
BUN SERPL-MCNC: 10 MG/DL (ref 6–20)
BUN/CREAT SERPL: 16 (ref 12–20)
CA-I BLD-MCNC: 8.5 MG/DL (ref 8.5–10.1)
CALCULATED P AXIS, ECG09: 79 DEGREES
CALCULATED R AXIS, ECG10: 90 DEGREES
CALCULATED T AXIS, ECG11: 35 DEGREES
CHLORIDE SERPL-SCNC: 104 MMOL/L (ref 97–108)
CO2 SERPL-SCNC: 28 MMOL/L (ref 21–32)
CREAT SERPL-MCNC: 0.64 MG/DL (ref 0.55–1.02)
DIAGNOSIS, 93000: NORMAL
GLUCOSE BLD STRIP.AUTO-MCNC: 123 MG/DL (ref 65–100)
GLUCOSE BLD STRIP.AUTO-MCNC: 124 MG/DL (ref 65–100)
GLUCOSE BLD STRIP.AUTO-MCNC: 130 MG/DL (ref 65–100)
GLUCOSE SERPL-MCNC: 136 MG/DL (ref 65–100)
MAGNESIUM SERPL-MCNC: 2.2 MG/DL (ref 1.6–2.4)
P-R INTERVAL, ECG05: 150 MS
PERFORMED BY, TECHID: ABNORMAL
POTASSIUM SERPL-SCNC: 3.9 MMOL/L (ref 3.5–5.1)
Q-T INTERVAL, ECG07: 342 MS
QRS DURATION, ECG06: 80 MS
QTC CALCULATION (BEZET), ECG08: 479 MS
SODIUM SERPL-SCNC: 137 MMOL/L (ref 136–145)
VENTRICULAR RATE, ECG03: 118 BPM

## 2020-11-16 PROCEDURE — 36415 COLL VENOUS BLD VENIPUNCTURE: CPT

## 2020-11-16 PROCEDURE — 83735 ASSAY OF MAGNESIUM: CPT

## 2020-11-16 PROCEDURE — 74018 RADEX ABDOMEN 1 VIEW: CPT

## 2020-11-16 PROCEDURE — 80048 BASIC METABOLIC PNL TOTAL CA: CPT

## 2020-11-16 PROCEDURE — 74011250637 HC RX REV CODE- 250/637: Performed by: INTERNAL MEDICINE

## 2020-11-16 PROCEDURE — 99218 HC RM OBSERVATION: CPT

## 2020-11-16 PROCEDURE — 82962 GLUCOSE BLOOD TEST: CPT

## 2020-11-16 PROCEDURE — 74011250636 HC RX REV CODE- 250/636: Performed by: INTERNAL MEDICINE

## 2020-11-16 PROCEDURE — 96376 TX/PRO/DX INJ SAME DRUG ADON: CPT

## 2020-11-16 PROCEDURE — 82306 VITAMIN D 25 HYDROXY: CPT

## 2020-11-16 RX ORDER — FACIAL-BODY WIPES
10 EACH TOPICAL DAILY PRN
Status: DISCONTINUED | OUTPATIENT
Start: 2020-11-16 | End: 2020-11-17 | Stop reason: HOSPADM

## 2020-11-16 RX ORDER — PROMETHAZINE HYDROCHLORIDE 12.5 MG/1
12.5 TABLET ORAL
Qty: 24 TAB | Refills: 0 | Status: SHIPPED | OUTPATIENT
Start: 2020-11-16 | End: 2020-11-23

## 2020-11-16 RX ORDER — METOPROLOL SUCCINATE 25 MG/1
50 TABLET, EXTENDED RELEASE ORAL DAILY
Qty: 30 TAB | Refills: 1 | Status: SHIPPED | OUTPATIENT
Start: 2020-11-16 | End: 2021-03-09 | Stop reason: SDUPTHER

## 2020-11-16 RX ORDER — ERGOCALCIFEROL 1.25 MG/1
50000 CAPSULE ORAL
Qty: 4 CAP | Refills: 2 | Status: SHIPPED | OUTPATIENT
Start: 2020-11-16 | End: 2020-12-16

## 2020-11-16 RX ORDER — CEPHALEXIN 500 MG/1
500 CAPSULE ORAL 2 TIMES DAILY
Qty: 6 CAP | Refills: 0 | Status: SHIPPED | OUTPATIENT
Start: 2020-11-16 | End: 2020-11-19

## 2020-11-16 RX ORDER — FACIAL-BODY WIPES
10 EACH TOPICAL
Qty: 5 EACH | Refills: 1 | Status: SHIPPED | OUTPATIENT
Start: 2020-11-16 | End: 2021-01-26 | Stop reason: ALTCHOICE

## 2020-11-16 RX ORDER — MAGNESIUM CITRATE
296 SOLUTION, ORAL ORAL
Status: COMPLETED | OUTPATIENT
Start: 2020-11-16 | End: 2020-11-16

## 2020-11-16 RX ORDER — POLYETHYLENE GLYCOL 3350 17 G/17G
17 POWDER, FOR SOLUTION ORAL
Qty: 30 PACKET | Refills: 0 | Status: SHIPPED | OUTPATIENT
Start: 2020-11-16 | End: 2021-01-26 | Stop reason: ALTCHOICE

## 2020-11-16 RX ORDER — SENNOSIDES 8.6 MG/1
1 CAPSULE, GELATIN COATED ORAL
Qty: 30 CAP | Refills: 0 | Status: SHIPPED | OUTPATIENT
Start: 2020-11-16 | End: 2020-12-16

## 2020-11-16 RX ADMIN — PROMETHAZINE HYDROCHLORIDE 12.5 MG: 25 TABLET ORAL at 11:38

## 2020-11-16 RX ADMIN — ASPIRIN 325 MG ORAL TABLET 325 MG: 325 PILL ORAL at 08:35

## 2020-11-16 RX ADMIN — MAGNESIUM CITRATE 296 ML: 1.75 LIQUID ORAL at 11:13

## 2020-11-16 RX ADMIN — Medication 10 ML: at 06:00

## 2020-11-16 RX ADMIN — ONDANSETRON 4 MG: 2 INJECTION INTRAMUSCULAR; INTRAVENOUS at 09:58

## 2020-11-16 RX ADMIN — LISINOPRIL 20 MG: 20 TABLET ORAL at 08:35

## 2020-11-16 RX ADMIN — METOPROLOL TARTRATE 50 MG: 50 TABLET, FILM COATED ORAL at 08:35

## 2020-11-16 RX ADMIN — PANTOPRAZOLE SODIUM 40 MG: 40 TABLET, DELAYED RELEASE ORAL at 06:01

## 2020-11-16 RX ADMIN — KETOROLAC TROMETHAMINE 15 MG: 30 INJECTION, SOLUTION INTRAMUSCULAR at 10:47

## 2020-11-16 RX ADMIN — POTASSIUM CHLORIDE AND SODIUM CHLORIDE: 900; 150 INJECTION, SOLUTION INTRAVENOUS at 07:29

## 2020-11-16 RX ADMIN — BISACODYL 10 MG: 10 SUPPOSITORY RECTAL at 16:39
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
## 2020-11-16 NOTE — PROGRESS NOTES
Problem: Falls - Risk of  Goal: *Absence of Falls  Description: Document Kyaw Apo Fall Risk and appropriate interventions in the flowsheet.   Outcome: Progressing Towards Goal  Note: Fall Risk Interventions:            Medication Interventions: Patient to call before getting OOB                   Problem: Nausea/Vomiting (Adult)  Goal: *Absence of nausea/vomiting  Outcome: Progressing Towards Goal

## 2020-11-17 LAB
BACTERIA SPEC CULT: ABNORMAL
COLONY COUNT,CNT: ABNORMAL
SPECIAL REQUESTS,SREQ: ABNORMAL

## 2020-11-17 NOTE — PROGRESS NOTES
IV and telemetry discontinued. Discharge instructions reviewed with patient and friend. Friend was patient's ride home. Prescriptions called to pharmacy. Follow-up appointments set to be made by  on 11/17 AM.      Discharge plan of care/case management plan validated with provider discharge order.

## 2021-01-26 ENCOUNTER — OFFICE VISIT (OUTPATIENT)
Dept: PODIATRY | Age: 41
End: 2021-01-26
Payer: MEDICAID

## 2021-01-26 VITALS
DIASTOLIC BLOOD PRESSURE: 93 MMHG | OXYGEN SATURATION: 99 % | SYSTOLIC BLOOD PRESSURE: 142 MMHG | WEIGHT: 143 LBS | HEART RATE: 78 BPM | TEMPERATURE: 97.5 F | BODY MASS INDEX: 22.98 KG/M2 | HEIGHT: 66 IN

## 2021-01-26 DIAGNOSIS — B35.1 TOENAIL FUNGUS: ICD-10-CM

## 2021-01-26 DIAGNOSIS — Z79.4 TYPE 2 DIABETES MELLITUS WITH OTHER SPECIFIED COMPLICATION, WITH LONG-TERM CURRENT USE OF INSULIN (HCC): Primary | ICD-10-CM

## 2021-01-26 DIAGNOSIS — E11.69 TYPE 2 DIABETES MELLITUS WITH OTHER SPECIFIED COMPLICATION, WITH LONG-TERM CURRENT USE OF INSULIN (HCC): Primary | ICD-10-CM

## 2021-01-26 PROCEDURE — 99203 OFFICE O/P NEW LOW 30 MIN: CPT | Performed by: PODIATRIST

## 2021-01-26 PROCEDURE — 11755 BIOPSY NAIL UNIT: CPT | Performed by: PODIATRIST

## 2021-01-31 NOTE — PROGRESS NOTES
[facility]    Subjective:         Patient is a 36 y.o. female who is being seen as a new pt for a diabetic pedal exam with an acute complaint of thick/discolored big toenails. Pt states she has been diabetic for many years. States her disease is out of control and that she does not have f/u with an endocrinologist. She denies testing ever being performed to confirm a diagnosis for her big toenails. She states she has pedal pain, rising to 6/10. She states the pain is worse after long periods of ambulation. Denies any recent trauma. Denies any systemic signs of infx. She denies any other pedal complaints    Past Medical History:   Diagnosis Date    Cholesterol serum increased 5/19/2010    Diabetes mellitus ([address] 5/19/2010    HTN (hypertension), benign 5/19/2010    Vitamin D deficiency 11/16/2020     Past Surgical History:   Procedure Laterality Date    HX GYN      s/p Hysterectomy       Family History   Problem Relation Age of Onset    Diabetes Mother     Cancer Father     Alcohol abuse Neg Hx     Arthritis-rheumatoid Neg Hx     Asthma Neg Hx     Bleeding Prob Neg Hx     Elevated Lipids Neg Hx     Headache Neg Hx     Heart Disease Neg Hx     Hypertension Neg Hx     Lung Disease Neg Hx     Migraines Neg Hx     Psychiatric Disorder Neg Hx     Stroke Neg Hx     Mental Retardation Neg Hx       Social History     Tobacco Use    Smoking status: Current Every Day Smoker     Packs/day: 1.00    Smokeless tobacco: Never Used   Substance Use Topics    Alcohol use: No     No Known Allergies  Prior to Admission medications    Medication Sig Start Date End Date Taking? Authorizing Provider   metoprolol succinate (TOPROL-XL) 25 mg XL tablet Take 2 Tabs by mouth daily. Indications: paroxysmal supraventricular tachycardia 11/16/20  Yes Antony Caceres MD   divalproex DR (DEPAKOTE) 500 mg tablet Take 1 Tab by mouth two (2) times a day.  4/21/17  Yes Samson Oakley MD lisinopril-hydroCHLOROthiazide (PRINZIDE, ZESTORETIC) 20-25 mg per tablet Take 1 Tab by mouth daily. 3/28/17  Yes Wendi Valentin MD   metFORMIN (GLUCOPHAGE) 1,000 mg tablet Take 1 Tab by mouth two (2) times daily (with meals). 3/28/17  Yes Wendi Valentin MD   insulin glargine (LANTUS) 100 unit/mL injection 15 Units by SubCUTAneous route daily. 1/11/17  Yes Wendi Valentin MD       Review of Systems   Constitutional: Negative. HENT: Negative. Eyes: Negative. Respiratory: Negative. Cardiovascular: Negative. Gastrointestinal: Negative. Endocrine: Negative. Genitourinary: Negative. Musculoskeletal: Negative. Skin: Negative. Allergic/Immunologic: Positive for immunocompromised state. Neurological: Negative. Hematological: Negative. Psychiatric/Behavioral: Negative. All other systems reviewed and are negative. Objective:     Visit Vitals  BP (!) 142/93 (BP 1 Location: Right arm, BP Patient Position: Sitting)   Pulse 78   Temp 97.5 °F (36.4 °C) (Temporal)   Ht 5' 6\" (1.676 m)   Wt 143 lb (64.9 kg)   SpO2 99%   BMI 23.08 kg/m²       Physical Exam  Vitals signs reviewed. Constitutional:       Appearance: She is normal weight. Cardiovascular:      Pulses:           Dorsalis pedis pulses are 2+ on the right side and 2+ on the left side. Posterior tibial pulses are 2+ on the right side and 2+ on the left side. Pulmonary:      Effort: Pulmonary effort is normal.   Musculoskeletal:      Right lower leg: No edema. Left lower leg: No edema. Right foot: Normal range of motion. No deformity or bunion. Left foot: Normal range of motion. No deformity or bunion. Feet:      Right foot:      Protective Sensation: 10 sites tested. 10 sites sensed. Skin integrity: Skin integrity normal.      Toenail Condition: Right toenails are abnormally thick. Fungal disease present. Left foot:      Protective Sensation: 10 sites tested. 10 sites sensed.       Skin integrity: Skin integrity normal.      Toenail Condition: Left toenails are abnormally thick. Fungal disease present. Lymphadenopathy:      Lower Body: No right inguinal adenopathy. No left inguinal adenopathy. Skin:     General: Skin is warm. Capillary Refill: Capillary refill takes 2 to 3 seconds. Neurological:      Mental Status: She is alert and oriented to person, place, and time. Psychiatric:         Mood and Affect: Mood and affect normal.         Behavior: Behavior is cooperative. Data Review: No results found for this or any previous visit (from the past 24 hour(s)). Impression:       ICD-10-CM ICD-9-CM    1. Type 2 diabetes mellitus with other specified complication, with long-term current use of insulin ([facility])  E11.69 250.80 REFERRAL TO ENDOCRINOLOGY    Z79.4 V58.67    2. Toenail fungus  B35.1 110.1 BIOPSY, NAIL UNIT         Recommendation:     Patient seen and evaluated in the office  Discussed and educated patient regarding their current medical condition. Instructed patient to monitor their feet daily, be compliant with all medications and wear supportive shoe gear. At this time, it was determined that a nail plate biopsy would be taken of the right hallux to confirm the presence of fungal elements. This was performed with a nail nipper without incident. Pt tolerated well with no dressing.  Instructed pt that when pathology results return, will discuss tx options in more depth  A referral was given to endocrinology for further workup and management of her uncontrolled DM        RTC in 3-6 months

## 2021-02-16 ENCOUNTER — APPOINTMENT (OUTPATIENT)
Dept: CT IMAGING | Age: 41
End: 2021-02-16
Attending: NURSE PRACTITIONER
Payer: MEDICAID

## 2021-02-16 ENCOUNTER — APPOINTMENT (OUTPATIENT)
Dept: GENERAL RADIOLOGY | Age: 41
End: 2021-02-16
Attending: EMERGENCY MEDICINE
Payer: MEDICAID

## 2021-02-16 ENCOUNTER — HOSPITAL ENCOUNTER (EMERGENCY)
Age: 41
Discharge: HOME | End: 2021-02-16
Payer: MEDICAID

## 2021-02-16 VITALS
TEMPERATURE: 98.6 F | RESPIRATION RATE: 21 BRPM | WEIGHT: 143 LBS | SYSTOLIC BLOOD PRESSURE: 175 MMHG | BODY MASS INDEX: 22.98 KG/M2 | DIASTOLIC BLOOD PRESSURE: 103 MMHG | OXYGEN SATURATION: 100 % | HEART RATE: 84 BPM | HEIGHT: 66 IN

## 2021-02-16 DIAGNOSIS — I10 ESSENTIAL HYPERTENSION: Primary | ICD-10-CM

## 2021-02-16 DIAGNOSIS — Z76.0 MEDICATION REFILL: ICD-10-CM

## 2021-02-16 LAB
ANION GAP SERPL CALC-SCNC: 8 MMOL/L (ref 5–15)
APTT PPP: 25.5 SEC (ref 23–35.7)
ATRIAL RATE: 79 BPM
BUN SERPL-MCNC: 6 MG/DL (ref 6–20)
BUN/CREAT SERPL: 8 (ref 12–20)
CA-I BLD-MCNC: 9.5 MG/DL (ref 8.5–10.1)
CALCULATED P AXIS, ECG09: 80 DEGREES
CALCULATED R AXIS, ECG10: 92 DEGREES
CALCULATED T AXIS, ECG11: 63 DEGREES
CHLORIDE SERPL-SCNC: 102 MMOL/L (ref 97–108)
CO2 SERPL-SCNC: 25 MMOL/L (ref 21–32)
CREAT SERPL-MCNC: 0.71 MG/DL (ref 0.55–1.02)
DIAGNOSIS, 93000: NORMAL
GLUCOSE BLD STRIP.AUTO-MCNC: 194 MG/DL (ref 65–100)
GLUCOSE SERPL-MCNC: 189 MG/DL (ref 65–100)
P-R INTERVAL, ECG05: 146 MS
PERFORMED BY, TECHID: ABNORMAL
POTASSIUM SERPL-SCNC: 3.8 MMOL/L (ref 3.5–5.1)
Q-T INTERVAL, ECG07: 386 MS
QRS DURATION, ECG06: 72 MS
QTC CALCULATION (BEZET), ECG08: 442 MS
SODIUM SERPL-SCNC: 135 MMOL/L (ref 136–145)
THERAPEUTIC RANGE,PTTT: NORMAL SEC (ref 68–109)
TROPONIN I SERPL-MCNC: <0.05 NG/ML
VENTRICULAR RATE, ECG03: 79 BPM

## 2021-02-16 PROCEDURE — 74011000250 HC RX REV CODE- 250: Performed by: NURSE PRACTITIONER

## 2021-02-16 PROCEDURE — 71045 X-RAY EXAM CHEST 1 VIEW: CPT

## 2021-02-16 PROCEDURE — 74011250636 HC RX REV CODE- 250/636

## 2021-02-16 PROCEDURE — 93005 ELECTROCARDIOGRAM TRACING: CPT

## 2021-02-16 PROCEDURE — 82962 GLUCOSE BLOOD TEST: CPT

## 2021-02-16 PROCEDURE — 99284 EMERGENCY DEPT VISIT MOD MDM: CPT

## 2021-02-16 PROCEDURE — 84484 ASSAY OF TROPONIN QUANT: CPT

## 2021-02-16 PROCEDURE — 36415 COLL VENOUS BLD VENIPUNCTURE: CPT

## 2021-02-16 PROCEDURE — 74011250637 HC RX REV CODE- 250/637: Performed by: NURSE PRACTITIONER

## 2021-02-16 PROCEDURE — 70450 CT HEAD/BRAIN W/O DYE: CPT

## 2021-02-16 PROCEDURE — 74011250636 HC RX REV CODE- 250/636: Performed by: NURSE PRACTITIONER

## 2021-02-16 PROCEDURE — 85730 THROMBOPLASTIN TIME PARTIAL: CPT

## 2021-02-16 PROCEDURE — 99281 EMR DPT VST MAYX REQ PHY/QHP: CPT

## 2021-02-16 PROCEDURE — 96375 TX/PRO/DX INJ NEW DRUG ADDON: CPT

## 2021-02-16 PROCEDURE — 80048 BASIC METABOLIC PNL TOTAL CA: CPT

## 2021-02-16 PROCEDURE — 96374 THER/PROPH/DIAG INJ IV PUSH: CPT

## 2021-02-16 RX ORDER — ACETAMINOPHEN 325 MG/1
650 TABLET ORAL
Status: COMPLETED | OUTPATIENT
Start: 2021-02-16 | End: 2021-02-16

## 2021-02-16 RX ORDER — KETOROLAC TROMETHAMINE 30 MG/ML
30 INJECTION, SOLUTION INTRAMUSCULAR; INTRAVENOUS
Status: COMPLETED | OUTPATIENT
Start: 2021-02-16 | End: 2021-02-16

## 2021-02-16 RX ORDER — LISINOPRIL 10 MG/1
20 TABLET ORAL DAILY
Status: DISCONTINUED | OUTPATIENT
Start: 2021-02-17 | End: 2021-02-16 | Stop reason: HOSPADM

## 2021-02-16 RX ORDER — ONDANSETRON 2 MG/ML
4 INJECTION INTRAMUSCULAR; INTRAVENOUS ONCE
Status: COMPLETED | OUTPATIENT
Start: 2021-02-16 | End: 2021-02-16

## 2021-02-16 RX ORDER — METOPROLOL TARTRATE 5 MG/5ML
5 INJECTION INTRAVENOUS ONCE
Status: COMPLETED | OUTPATIENT
Start: 2021-02-16 | End: 2021-02-16

## 2021-02-16 RX ORDER — METOPROLOL TARTRATE 50 MG/1
50 TABLET ORAL
Status: COMPLETED | OUTPATIENT
Start: 2021-02-16 | End: 2021-02-16

## 2021-02-16 RX ORDER — METOPROLOL SUCCINATE 50 MG/1
50 TABLET, EXTENDED RELEASE ORAL DAILY
Qty: 30 TAB | Refills: 0 | Status: SHIPPED | OUTPATIENT
Start: 2021-02-16 | End: 2021-03-09 | Stop reason: ALTCHOICE

## 2021-02-16 RX ORDER — LISINOPRIL AND HYDROCHLOROTHIAZIDE 20; 25 MG/1; MG/1
1 TABLET ORAL DAILY
Qty: 30 TAB | Refills: 0 | Status: SHIPPED | OUTPATIENT
Start: 2021-02-16 | End: 2021-03-09 | Stop reason: SDUPTHER

## 2021-02-16 RX ORDER — ONDANSETRON 4 MG/1
4 TABLET, ORALLY DISINTEGRATING ORAL
Qty: 10 TAB | Refills: 1 | OUTPATIENT
Start: 2021-02-16 | End: 2021-12-27

## 2021-02-16 RX ORDER — ONDANSETRON 2 MG/ML
4 INJECTION INTRAMUSCULAR; INTRAVENOUS
Status: COMPLETED | OUTPATIENT
Start: 2021-02-16 | End: 2021-02-16

## 2021-02-16 RX ORDER — ONDANSETRON 2 MG/ML
INJECTION INTRAMUSCULAR; INTRAVENOUS
Status: COMPLETED
Start: 2021-02-16 | End: 2021-02-16

## 2021-02-16 RX ORDER — BUTALBITAL, ACETAMINOPHEN AND CAFFEINE 300; 40; 50 MG/1; MG/1; MG/1
1 CAPSULE ORAL
Qty: 10 CAP | Refills: 0 | Status: SHIPPED | OUTPATIENT
Start: 2021-02-16

## 2021-02-16 RX ORDER — LORAZEPAM 2 MG/ML
1 INJECTION INTRAMUSCULAR
Status: COMPLETED | OUTPATIENT
Start: 2021-02-16 | End: 2021-02-16

## 2021-02-16 RX ORDER — HYDROCHLOROTHIAZIDE 25 MG/1
25 TABLET ORAL
Status: COMPLETED | OUTPATIENT
Start: 2021-02-16 | End: 2021-02-16

## 2021-02-16 RX ADMIN — ACETAMINOPHEN 650 MG: 325 TABLET ORAL at 16:43

## 2021-02-16 RX ADMIN — METOPROLOL TARTRATE 5 MG: 5 INJECTION INTRAVENOUS at 18:32

## 2021-02-16 RX ADMIN — METOPROLOL TARTRATE 50 MG: 50 TABLET, FILM COATED ORAL at 16:43

## 2021-02-16 RX ADMIN — ONDANSETRON 4 MG: 2 INJECTION INTRAMUSCULAR; INTRAVENOUS at 16:43

## 2021-02-16 RX ADMIN — ONDANSETRON 4 MG: 2 INJECTION INTRAMUSCULAR; INTRAVENOUS at 18:32

## 2021-02-16 RX ADMIN — HYDROCHLOROTHIAZIDE 25 MG: 25 TABLET ORAL at 16:43

## 2021-02-16 RX ADMIN — KETOROLAC TROMETHAMINE 30 MG: 30 INJECTION, SOLUTION INTRAMUSCULAR at 18:32

## 2021-02-16 RX ADMIN — LORAZEPAM 1 MG: 2 INJECTION INTRAMUSCULAR; INTRAVENOUS at 18:32

## 2021-02-16 NOTE — ED PROVIDER NOTES
EMERGENCY DEPARTMENT HISTORY AND PHYSICAL EXAM      Date: 2/16/2021  Patient Name: Zayra Hitchcock    History of Presenting Illness     Chief Complaint   Patient presents with    Chest Pain       History Provided By: Patient    HPI: Zayra Hitchcock, 36 y.o. female with a past medical history significant hypertension presents to the ED with cc of chest pain. Patient has been out of her hypertension medicines. Patient states a history of uncontrolled hypertension. Patient also states that 4 hours prior to arrival she began having chest pain. Patient denies shortness of breath. Moderate severity, no known exacerbating or relieving factors, no other associated signs and symptoms    There are no other complaints, changes, or physical findings at this time. PCP: Other, MD Cecilia    No current facility-administered medications on file prior to encounter. Current Outpatient Medications on File Prior to Encounter   Medication Sig Dispense Refill    metoprolol succinate (TOPROL-XL) 25 mg XL tablet Take 2 Tabs by mouth daily. Indications: paroxysmal supraventricular tachycardia 30 Tab 1    divalproex DR (DEPAKOTE) 500 mg tablet Take 1 Tab by mouth two (2) times a day. 60 Tab 3    lisinopril-hydroCHLOROthiazide (PRINZIDE, ZESTORETIC) 20-25 mg per tablet Take 1 Tab by mouth daily. 30 Tab 3    metFORMIN (GLUCOPHAGE) 1,000 mg tablet Take 1 Tab by mouth two (2) times daily (with meals). 60 Tab 3    insulin glargine (LANTUS) 100 unit/mL injection 15 Units by SubCUTAneous route daily.  3 Vial 2       Past History     Past Medical History:  Past Medical History:   Diagnosis Date    Cholesterol serum increased 5/19/2010    Diabetes mellitus ([address] 5/19/2010    HTN (hypertension), benign 5/19/2010    Vitamin D deficiency 11/16/2020       Past Surgical History:  Past Surgical History:   Procedure Laterality Date    HX GYN      s/p Hysterectomy       Family History:  Family History   Problem Relation Age of Onset    Diabetes Mother     Cancer Father     Alcohol abuse Neg Hx     Arthritis-rheumatoid Neg Hx     Asthma Neg Hx     Bleeding Prob Neg Hx     Elevated Lipids Neg Hx     Headache Neg Hx     Heart Disease Neg Hx     Hypertension Neg Hx     Lung Disease Neg Hx     Migraines Neg Hx     Psychiatric Disorder Neg Hx     Stroke Neg Hx     Mental Retardation Neg Hx        Social History:  Social History     Tobacco Use    Smoking status: Current Every Day Smoker     Packs/day: 1.00    Smokeless tobacco: Never Used   Substance Use Topics    Alcohol use: No    Drug use: No       Allergies:  No Known Allergies      Review of Systems     Review of Systems   Constitutional: Negative for chills, fatigue and fever. HENT: Negative for congestion, sinus pressure and trouble swallowing. Eyes: Negative for photophobia and pain. Respiratory: Negative for cough and shortness of breath. Cardiovascular: Positive for chest pain. Negative for leg swelling. Gastrointestinal: Negative for abdominal pain, diarrhea, nausea and vomiting. Endocrine: Negative for polydipsia, polyphagia and polyuria. Genitourinary: Negative for decreased urine volume, difficulty urinating, dysuria, hematuria and urgency. Musculoskeletal: Negative for back pain, gait problem, myalgias and neck pain. Skin: Negative for pallor and rash. Allergic/Immunologic: Negative for environmental allergies and food allergies. Neurological: Negative for dizziness, facial asymmetry, speech difficulty, numbness and headaches. Hematological: Negative for adenopathy. Does not bruise/bleed easily. Psychiatric/Behavioral: Negative for agitation, self-injury and suicidal ideas. The patient is not nervous/anxious. Physical Exam     Physical Exam  Vitals signs and nursing note reviewed. Constitutional:       Appearance: Normal appearance. HENT:      Head: Atraumatic.       Right Ear: Tympanic membrane and external ear normal.      Left Ear: Tympanic membrane and external ear normal.      Nose: Nose normal.      Mouth/Throat:      Mouth: Mucous membranes are moist.   Eyes:      Extraocular Movements: Extraocular movements intact. Pupils: Pupils are equal, round, and reactive to light. Neck:      Musculoskeletal: Normal range of motion and neck supple. Cardiovascular:      Rate and Rhythm: Normal rate and regular rhythm. Pulses: Normal pulses. Heart sounds: Normal heart sounds. Pulmonary:      Breath sounds: Normal breath sounds. Abdominal:      General: Abdomen is flat. Palpations: Abdomen is soft. Musculoskeletal: Normal range of motion. Skin:     General: Skin is warm and dry. Capillary Refill: Capillary refill takes less than 2 seconds. Neurological:      General: No focal deficit present. Mental Status: She is alert and oriented to person, place, and time. Mental status is at baseline. Psychiatric:         Mood and Affect: Mood normal.         Behavior: Behavior normal.         Lab and Diagnostic Study Results     Labs -     No results found for this or any previous visit (from the past 12 hour(s)). Radiologic Studies -   @lastxrresult@  CT Results  (Last 48 hours)    None        CXR Results  (Last 48 hours)    None            Medical Decision Making   - I am the first provider for this patient. - I reviewed the vital signs, available nursing notes, past medical history, past surgical history, family history and social history. - Initial assessment performed. The patients presenting problems have been discussed, and they are in agreement with the care plan formulated and outlined with them. I have encouraged them to ask questions as they arise throughout their visit. Vital Signs-Reviewed the patient's vital signs. No data found.     Records Reviewed: Nursing Notes and Old Medical Records          ED Course:          Provider Notes (Medical Decision Making):   DDx includes STEMI, NSTEMI, Angina, PE, Aortic Pathology, Chest Wall Pain, Pleurisy, Pneumonia, GERD/esophagitis, Anxiety. No cough/fever or focal lung findings to suggest pneumonia. No tachycardia, hypoxia or pleuritic component to suggest PE. Pulses symmetric and no extremely elevated BP/asymmetry or classic tearing sensation to suggest Aortic Dissection. Also, no neuro findings. No wretching/forceful vomiting to suggest esophageal disaster. Denies IV drug abuse, has native valves, no fevers/murmurs or skin lesions to suggest endocarditis. Will evaluate with EKG, labs, cardiac enzymes, chest x-ray. Will provide pain control and reassess. MDM       Procedures   Medical Decision Makingedical Decision Making  Performed by: Kelly Ellison NP  PROCEDURES:  Procedures       Disposition   Disposition: DC- Adult Discharges: All of the diagnostic tests were reviewed and questions answered. Diagnosis, care plan and treatment options were discussed. The patient understands the instructions and will follow up as directed. The patients results have been reviewed with them. They have been counseled regarding their diagnosis. The patient verbally convey understanding and agreement of the signs, symptoms, diagnosis, treatment and prognosis and additionally agrees to follow up as recommended with their PCP in 24 - 48 hours. They also agree with the care-plan and convey that all of their questions have been answered. I have also put together some discharge instructions for them that include: 1) educational information regarding their diagnosis, 2) how to care for their diagnosis at home, as well a 3) list of reasons why they would want to return to the ED prior to their follow-up appointment, should their condition change. Discharged    DISCHARGE PLAN:  1. Current Discharge Medication List      CONTINUE these medications which have NOT CHANGED    Details   metoprolol succinate (TOPROL-XL) 25 mg XL tablet Take 2 Tabs by mouth daily. Indications: paroxysmal supraventricular tachycardia  Qty: 30 Tab, Refills: 1    Associated Diagnoses: PSVT (paroxysmal supraventricular tachycardia) ([facility])      divalproex DR (DEPAKOTE) 500 mg tablet Take 1 Tab by mouth two (2) times a day. Qty: 60 Tab, Refills: 3      lisinopril-hydroCHLOROthiazide (PRINZIDE, ZESTORETIC) 20-25 mg per tablet Take 1 Tab by mouth daily. Qty: 30 Tab, Refills: 3    Associated Diagnoses: HTN (hypertension), benign      metFORMIN (GLUCOPHAGE) 1,000 mg tablet Take 1 Tab by mouth two (2) times daily (with meals). Qty: 60 Tab, Refills: 3    Associated Diagnoses: Type 2 diabetes mellitus with other specified complication, with long-term current use of insulin ([facility])      insulin glargine (LANTUS) 100 unit/mL injection 15 Units by SubCUTAneous route daily. Qty: 3 Vial, Refills: 2    Associated Diagnoses: Type 2 diabetes mellitus with other specified complication, with long-term current use of insulin ([facility]           2. Follow-up Information     Follow up With Specialties Details Why [address]  Schedule an appointment as soon as possible for a visit   [address] [facility] [address]    Conner Elizondo MD Family Medicine Schedule an appointment as soon as possible for a visit   [address] [facility] [address]  [phone]      Maura Magallanes MD Internal Medicine   [address]  [phone]          3. Return to ED if worse   4. Discharge Medication List as of 2/16/2021  8:04 PM      START taking these medications    Details   !! lisinopril-hydroCHLOROthiazide (PRINZIDE, ZESTORETIC) 20-25 mg per tablet Take 1 Tab by mouth daily for 30 days. , Normal, Disp-30 Tab, R-0      !! metoprolol succinate (TOPROL-XL) 50 mg XL tablet Take 1 Tab by mouth daily for 30 days. , Normal, Disp-30 Tab, R-0      ondansetron (Zofran ODT) 4 mg disintegrating tablet Take 1 Tab by mouth every eight (8) hours as needed for Nausea or Vomiting., Normal, Disp-10 Tab, R-1      butalbital-acetaminophen-caff (Fioricet) -40 mg per capsule Take 1 Cap by mouth every four (4) hours as needed for Headache for up to 10 doses. , Normal, Disp-10 Cap, R-0       !! - Potential duplicate medications found. Please discuss with provider. CONTINUE these medications which have NOT CHANGED    Details   !! metoprolol succinate (TOPROL-XL) 25 mg XL tablet Take 2 Tabs by mouth daily. Indications: paroxysmal supraventricular tachycardia, Normal, Disp-30 Tab,R-1      divalproex DR (DEPAKOTE) 500 mg tablet Take 1 Tab by mouth two (2) times a day., Normal, Disp-60 Tab, R-3      !! lisinopril-hydroCHLOROthiazide (PRINZIDE, ZESTORETIC) 20-25 mg per tablet Take 1 Tab by mouth daily. , Normal, Disp-30 Tab, R-3      metFORMIN (GLUCOPHAGE) 1,000 mg tablet Take 1 Tab by mouth two (2) times daily (with meals). , Normal, Disp-60 Tab, R-3      insulin glargine (LANTUS) 100 unit/mL injection 15 Units by SubCUTAneous route daily. , Normal, Disp-3 Vial, R-2       !! - Potential duplicate medications found. Please discuss with provider. Diagnosis     Clinical Impression:   1. Essential hypertension    2. Medication refill        Attestations:    Avani Clayton NP    Please note that this dictation was completed with [employer], the [employer] voice recognition software. Quite often unanticipated grammatical, syntax, homophones, and other interpretive errors are inadvertently transcribed by the computer software. Please disregard these errors. Please excuse any errors that have escaped final proofreading. Thank you.

## 2021-03-09 ENCOUNTER — OFFICE VISIT (OUTPATIENT)
Dept: ENDOCRINOLOGY | Age: 41
End: 2021-03-09
Payer: MEDICAID

## 2021-03-09 VITALS
SYSTOLIC BLOOD PRESSURE: 143 MMHG | HEIGHT: 65 IN | DIASTOLIC BLOOD PRESSURE: 99 MMHG | OXYGEN SATURATION: 99 % | BODY MASS INDEX: 21.84 KG/M2 | TEMPERATURE: 97.3 F | HEART RATE: 80 BPM | WEIGHT: 131.1 LBS

## 2021-03-09 DIAGNOSIS — I10 BENIGN ESSENTIAL HTN: ICD-10-CM

## 2021-03-09 DIAGNOSIS — I47.1 PSVT (PAROXYSMAL SUPRAVENTRICULAR TACHYCARDIA) (HCC): ICD-10-CM

## 2021-03-09 DIAGNOSIS — Z79.4 TYPE 2 DIABETES MELLITUS WITH OTHER SPECIFIED COMPLICATION, WITH LONG-TERM CURRENT USE OF INSULIN (HCC): ICD-10-CM

## 2021-03-09 DIAGNOSIS — E11.65 TYPE 2 DIABETES MELLITUS WITH HYPERGLYCEMIA, WITHOUT LONG-TERM CURRENT USE OF INSULIN (HCC): Primary | ICD-10-CM

## 2021-03-09 DIAGNOSIS — E11.69 TYPE 2 DIABETES MELLITUS WITH OTHER SPECIFIED COMPLICATION, WITH LONG-TERM CURRENT USE OF INSULIN (HCC): ICD-10-CM

## 2021-03-09 PROBLEM — F31.9 BIPOLAR DISORDER (HCC): Status: ACTIVE | Noted: 2021-03-09

## 2021-03-09 LAB
GLUCOSE POC: 187 MG/DL
HBA1C MFR BLD HPLC: 7.8 %

## 2021-03-09 PROCEDURE — 82962 GLUCOSE BLOOD TEST: CPT | Performed by: INTERNAL MEDICINE

## 2021-03-09 PROCEDURE — 99204 OFFICE O/P NEW MOD 45 MIN: CPT | Performed by: INTERNAL MEDICINE

## 2021-03-09 PROCEDURE — 83036 HEMOGLOBIN GLYCOSYLATED A1C: CPT | Performed by: INTERNAL MEDICINE

## 2021-03-09 RX ORDER — METFORMIN HYDROCHLORIDE 1000 MG/1
1000 TABLET ORAL 2 TIMES DAILY WITH MEALS
Qty: 60 TAB | Refills: 3 | Status: SHIPPED | OUTPATIENT
Start: 2021-03-09 | End: 2021-10-26 | Stop reason: SDUPTHER

## 2021-03-09 RX ORDER — METOPROLOL SUCCINATE 25 MG/1
50 TABLET, EXTENDED RELEASE ORAL DAILY
Qty: 30 TAB | Refills: 1 | Status: SHIPPED | OUTPATIENT
Start: 2021-03-09 | End: 2021-10-26 | Stop reason: SDUPTHER

## 2021-03-09 RX ORDER — LISINOPRIL AND HYDROCHLOROTHIAZIDE 20; 25 MG/1; MG/1
1 TABLET ORAL DAILY
Qty: 30 TAB | Refills: 0 | Status: SHIPPED | OUTPATIENT
Start: 2021-03-09 | End: 2021-08-20

## 2021-03-09 RX ORDER — INSULIN PUMP SYRINGE, 3 ML
EACH MISCELLANEOUS
Qty: 1 KIT | Refills: 0 | Status: SHIPPED | OUTPATIENT
Start: 2021-03-09

## 2021-03-09 RX ORDER — BLOOD SUGAR DIAGNOSTIC
STRIP MISCELLANEOUS
Qty: 50 STRIP | Refills: 5 | Status: SHIPPED | OUTPATIENT
Start: 2021-03-09 | End: 2021-11-10 | Stop reason: SDUPTHER

## 2021-03-09 RX ORDER — LANCETS
EACH MISCELLANEOUS
Qty: 50 EACH | Refills: 5 | Status: SHIPPED | OUTPATIENT
Start: 2021-03-09

## 2021-03-09 NOTE — PATIENT INSTRUCTIONS
Avoid bananas, grapes, watermelon, pineapple, fruit juice, soda, fruit cups, sweet tea. OK to eat apples, pears, berries, peach. Snacks: [id number]/pecans, walnuts, etc), carrots, celery, broccoli, cucumbers. Lot of water. Check glucose once a day.

## 2021-03-09 NOTE — PROGRESS NOTES
History and Physical    Patient: Vamshi Smart MRN: [id number]  SSN: [id number]    YOB: 1980  Age: 36 y.o. Sex: female      Subjective:      Vamshi Smart is a 36 y.o. female with past medical history of hypertension, paroxysmal supraventricular tachycardia, polysubstance abuse, bipolar disorder is here to establish care for type 2 diabetes mellitus. She is sent to me by podiatrist Dr. Grecia Browne. Patient moved to [place] recently. She currently does not have a primary care physician. She was diagnosed with diabetes when she was 16years old. She was around 180 pounds at that time. She was on Lantus and Metformin but she was on much higher dose of Lantus, at 50 units daily. She was hospitalized last year at [facility], at which time Lantus was decreased to only 10 units daily as patient had lost a lot of weight. She ran out of Metformin a while back, so she was taking only Lantus 10 units daily but she has not taken it for the past 1 week because she has very little left in the pen. She needs a new glucometer. She has not been checking blood glucose at home. She is overdue for diabetic eye exam.  She works at [employer]. She is currently living in a hotel room, eating mostly frozen, canned foods, drinking sugary beverages. She has a lot of constipation. Not exercising. She has depression, history of bipolar disorder previously on quetiapine, Seroquel but currently she is not taking anything. S/p partial hysterectomy 2014. Glucometer reading: Needs a new glucometer.     · Diagnosis: 16years old  · Current treatment: Lantus 10 units daily  · Past treatment: metformin (ran out)  · Glucose checks: needs new meter  · Hyperglycemia: yes  · Hypoglycemia: no  · Meals per day: 3,  · Exercise: no  · DM related hospitalizations: yes for hyperglycemia, DKA 9909    Complications of DM:  · CAD: no  · CVA: no  · PVD: no  · Amputations: no   · Retinopathy: no; last exam was 2017  · Gastropathy: no  · Nephropathy: no  · Neuropathy: yes    Medications:  · Statin: no  · ACE-I: lisinopril  · ASA: no    · Diabetes education: no    Past Medical History:   Diagnosis Date    Cholesterol serum increased 5/19/2010    Diabetes mellitus ([address] 5/19/2010    HTN (hypertension), benign 5/19/2010    Vitamin D deficiency 11/16/2020     Past Surgical History:   Procedure Laterality Date    HX GYN      s/p Hysterectomy      Family History   Problem Relation Age of Onset    Diabetes Mother     Cancer Father     Alcohol abuse Neg Hx     Arthritis-rheumatoid Neg Hx     Asthma Neg Hx     Bleeding Prob Neg Hx     Elevated Lipids Neg Hx     Headache Neg Hx     Heart Disease Neg Hx     Hypertension Neg Hx     Lung Disease Neg Hx     Migraines Neg Hx     Psychiatric Disorder Neg Hx     Stroke Neg Hx     Mental Retardation Neg Hx      Social History     Tobacco Use    Smoking status: Current Every Day Smoker     Packs/day: 1.00    Smokeless tobacco: Never Used   Substance Use Topics    Alcohol use: No      Prior to Admission medications    Medication Sig Start Date End Date Taking? Authorizing Provider   metFORMIN (GLUCOPHAGE) 1,000 mg tablet Take 1 Tab by mouth two (2) times daily (with meals). 3/9/21  Yes Zuleyma Perkins MD   lisinopril-hydroCHLOROthiazide (PRINZIDE, ZESTORETIC) 20-25 mg per tablet Take 1 Tab by mouth daily for 30 days. 3/9/21 4/8/21 Yes Zuleyma Perkins MD   metoprolol succinate (TOPROL-XL) 25 mg XL tablet Take 2 Tabs by mouth daily.  Indications: paroxysmal supraventricular tachycardia 3/9/21  Yes Zuleyma Perkins MD   Blood-Glucose Meter monitoring kit Check once a day 3/9/21  Yes Zuleyma Perkins MD   glucose blood VI test strips (OneTouch Verio test strips) strip Check once a day 3/9/21  Yes Zuleyma Perkins MD   lancets misc Check once a day 3/9/21  Yes Zuleyma Perkins MD   ondansetron (Zofran ODT) 4 mg disintegrating tablet Take 1 Tab by mouth every eight (8) hours as needed for Nausea or Vomiting. 2/16/21  Yes Sharon Later, NP   butalbital-acetaminophen-caff (Fioricet) -40 mg per capsule Take 1 Cap by mouth every four (4) hours as needed for Headache for up to 10 doses. 2/16/21  Yes Sharon Later, NP        No Known Allergies    Review of Systems:  ROS    A comprehensive review of systems was preformed and it is negative except mentioned in HPI    Objective:     Vitals:    03/09/21 1357 03/09/21 1406   BP: (!) 149/97 (!) 143/99   Pulse: 75 80   Temp: 97.3 °F (36.3 °C)    TempSrc: Temporal    SpO2: 99%    Weight: 131 lb 1.6 oz (59.5 kg)    Height: 5' 5\" (1.651 m)         Physical Exam:    Physical Exam  Vitals signs and nursing note reviewed. Constitutional:       Appearance: Normal appearance. HENT:      Head: Normocephalic and atraumatic. Eyes:      Extraocular Movements: Extraocular movements intact. Pupils: Pupils are equal, round, and reactive to light. Neck:      Musculoskeletal: Neck supple. Cardiovascular:      Rate and Rhythm: Normal rate and regular rhythm. Pulmonary:      Effort: Pulmonary effort is normal.      Breath sounds: Normal breath sounds. Abdominal:      General: Abdomen is flat. Palpations: Abdomen is soft. Musculoskeletal: Normal range of motion. General: No swelling. Skin:     General: Skin is warm and dry. Neurological:      General: No focal deficit present. Mental Status: She is alert and oriented to person, place, and time. Psychiatric:         Mood and Affect: Mood normal.         Behavior: Behavior normal.       diabetic foot exam:  Bilateral diabetic foot exam was performed today. Dorsalis pedis pulses 2+ bilaterally. Monofilament sensation decreased bilaterally. No ulcers or skin breakdown. Labs and Imaging:  Results for Vipul Connor (MRN [id number]) as of 3/9/2021 14:10   Ref.  Range 2/16/2021 13:48   Sodium Latest Ref Range: 136 - 145 mmol/L 135 (L)   Potassium Latest Ref Range: 3.5 - 5.1 mmol/L 3.8   Chloride Latest Ref Range: 97 - 108 mmol/L 102   CO2 Latest Ref Range: 21 - 32 mmol/L 25   Anion gap Latest Ref Range: 5 - 15 mmol/L 8   Glucose Latest Ref Range: 65 - 100 mg/dL 189 (H)   BUN Latest Ref Range: 6 - 20 mg/dL 6   Creatinine Latest Ref Range: 0.55 - 1.02 mg/dL 0.71   BUN/Creatinine ratio Latest Ref Range: 12 - 20   8 (L)   Calcium Latest Ref Range: 8.5 - 10.1 mg/dL 9.5   GFR est non-AA Latest Ref Range: >60 ml/min/1.73m2 >60   GFR est AA Latest Ref Range: >60 ml/min/1.73m2 >60   Results for Anthony Martinez (MRN [id number]) as of 3/9/2021 14:10   Ref. Range 11/14/2020 13:00   Triglyceride Latest Ref Range: <150 mg/dL 85   Cholesterol, total Latest Ref Range: <200 mg/dL 134   HDL Cholesterol Latest Units: mg/dL 49   CHOL/HDL Ratio Latest Ref Range: 0.0 - 5.0   2.7   VLDL, calculated Latest Units: mg/dL 17   LDL, calculated Latest Ref Range: 0 - 100 mg/dL 68   Results for Anthony Martinez (MRN [id number]) as of 3/9/2021 14:10   Ref. Range 11/14/2020 03:45   Bilirubin, total Latest Ref Range: 0.2 - 1.0 mg/dL 0.7   Protein, total Latest Ref Range: 6.4 - 8.2 g/dL 8.4 (H)   Albumin Latest Ref Range: 3.5 - 5.0 g/dL 4.0   Globulin Latest Ref Range: 2.0 - 4.0 g/dL 4.4 (H)   A-G Ratio Latest Ref Range: 1.1 - 2.2   0.9 (L)   ALT Latest Ref Range: 12 - 78 U/L 18   AST Latest Ref Range: 15 - 37 U/L 14 (L)   Alk. phosphatase Latest Ref Range: 45 - 117 U/L 94   Results for Anthony Martinez (MRN [id number]) as of 3/9/2021 14:10   Ref.  Range 11/14/2020 08:35   TSH Latest Ref Range: 0.36 - 3.74 uIU/mL 0.70     Last 3 Recorded Weights in this Encounter    03/09/21 1357   Weight: 131 lb 1.6 oz (59.5 kg)        Lab Results   Component Value Date/Time    Hemoglobin A1c 7.0 (H) 03/28/2017 10:45 AM    Hemoglobin A1c 11.6 (H) 11/07/2016 12:22 PM    Hemoglobin A1c 6.0 (H) 06/18/2014 10:21 AM        Assessment:     Patient Active Problem List   Diagnosis Code    HTN (hypertension), benign I10    Diabetes mellitus ([facility] [address] E11.9    Cholesterol serum increased     Menorrhagia, premenopausal N92.4    Tobacco abuse Z72.0    Anemia D64.9    Hyperlipidemia E78.5    Anxiety F41.9    Emotional lability R45.86    Toenail fungus B35.1    H/O medication noncompliance Z91.14    Constipation K59.00    UTI (urinary tract infection) N39.0    Vitamin D deficiency E55.9    Bipolar disorder (HCC) F31.9           Plan:     Type 2 diabetes mellitus, uncontrolled:  I reviewed progress note and labs from the referring provider's office. Hemoglobin A1c is 7.8% today. Fingerstick blood glucose is 187 mg/dL in my office today. Up to date with diabetes related annual labs: November 2020 except urine microalbumin/creatinine ratio  Up to date with diabetic eye exam: Due  Plan:  It appears that patient has lost more than 50 pounds in past couple years. Looking at her blood glucose without any medications, I will not restart her on Lantus. Restart Metformin 1000 mg twice a day. We discussed about making some changes in eating habits, avoiding sugary beverages, avoiding high sugar fruits, eating more vegetables, more water. I am sending prescription for new glucometer, start checking blood glucose once a day every day and bring glucometer to next visit in 3 months. I am referring patient for diabetic eye exam.    Essential hypertension:  Continue current medications. I am going to send a refill until she finds a primary care physician. History of bipolar disorder, depression, anxiety:  Advised patient to find a primary care physician. Tinea pedis:  Treated by podiatry.     History of polysubstance abuse and medication noncompliance    Orders Placed This Encounter    REFERRAL TO OPHTHALMOLOGY     Referral Priority:   Routine     Referral Type:   Consultation     Referral Reason:   Specialty Services Required     Referred to Provider:   Kavin Chu MD     Requested Specialty:   Ophthalmology Number of Visits Requested:   1    metFORMIN (GLUCOPHAGE) 1,000 mg tablet     Sig: Take 1 Tab by mouth two (2) times daily (with meals). Dispense:  60 Tab     Refill:  3    lisinopril-hydroCHLOROthiazide (PRINZIDE, ZESTORETIC) 20-25 mg per tablet     Sig: Take 1 Tab by mouth daily for 30 days. Dispense:  30 Tab     Refill:  0    metoprolol succinate (TOPROL-XL) 25 mg XL tablet     Sig: Take 2 Tabs by mouth daily.  Indications: paroxysmal supraventricular tachycardia     Dispense:  30 Tab     Refill:  1    Blood-Glucose Meter monitoring kit     Sig: Check once a day     Dispense:  1 Kit     Refill:  0    glucose blood VI test strips (OneTouch Verio test strips) strip     Sig: Check once a day     Dispense:  50 Strip     Refill:  5    lancets misc     Sig: Check once a day     Dispense:  50 Each     Refill:  5        Signed By: Mack Coleman MD     March 9, 2021      Return to clinic 3 months

## 2021-03-18 ENCOUNTER — TELEPHONE (OUTPATIENT)
Dept: ENDOCRINOLOGY | Age: 41
End: 2021-03-18

## 2021-03-18 DIAGNOSIS — E11.65 TYPE 2 DIABETES MELLITUS WITH HYPERGLYCEMIA, WITHOUT LONG-TERM CURRENT USE OF INSULIN (HCC): Primary | ICD-10-CM

## 2021-03-18 RX ORDER — LANCETS
EACH MISCELLANEOUS
Qty: 50 EACH | Refills: 5 | Status: SHIPPED | OUTPATIENT
Start: 2021-03-18

## 2021-03-18 RX ORDER — BLOOD-GLUCOSE METER
EACH MISCELLANEOUS
Qty: 1 EACH | Refills: 0 | Status: SHIPPED | OUTPATIENT
Start: 2021-03-18

## 2021-03-18 RX ORDER — CALCIUM CITRATE/VITAMIN D3 200MG-6.25
TABLET ORAL
Qty: 50 STRIP | Refills: 5 | Status: SHIPPED | OUTPATIENT
Start: 2021-03-18

## 2021-03-18 NOTE — TELEPHONE ENCOUNTER
Please inform patient that One Touch verio meter is not preferred by her insurance. They prefer true metrix. So I am going to send a new prescription to her pharmacy.

## 2021-05-09 ENCOUNTER — HOSPITAL ENCOUNTER (EMERGENCY)
Age: 41
Discharge: HOME | End: 2021-05-09
Payer: MEDICAID

## 2021-05-09 ENCOUNTER — APPOINTMENT (OUTPATIENT)
Dept: GENERAL RADIOLOGY | Age: 41
End: 2021-05-09
Attending: NURSE PRACTITIONER
Payer: MEDICAID

## 2021-05-09 VITALS
DIASTOLIC BLOOD PRESSURE: 97 MMHG | HEART RATE: 94 BPM | RESPIRATION RATE: 18 BRPM | SYSTOLIC BLOOD PRESSURE: 164 MMHG | HEIGHT: 66 IN | TEMPERATURE: 98.6 F | BODY MASS INDEX: 20.89 KG/M2 | OXYGEN SATURATION: 100 % | WEIGHT: 130 LBS

## 2021-05-09 DIAGNOSIS — E87.6 HYPOKALEMIA: ICD-10-CM

## 2021-05-09 DIAGNOSIS — R07.89 ATYPICAL CHEST PAIN: Primary | ICD-10-CM

## 2021-05-09 LAB
ALBUMIN SERPL-MCNC: 4.6 G/DL (ref 3.5–5)
ALBUMIN/GLOB SERPL: 1 {RATIO} (ref 1.1–2.2)
ALP SERPL-CCNC: 72 U/L (ref 45–117)
ALT SERPL-CCNC: 18 U/L (ref 12–78)
ANION GAP SERPL CALC-SCNC: 8 MMOL/L (ref 5–15)
AST SERPL W P-5'-P-CCNC: 7 U/L (ref 15–37)
ATRIAL RATE: 96 BPM
BASOPHILS # BLD: 0 K/UL (ref 0–0.1)
BASOPHILS NFR BLD: 0 % (ref 0–1)
BILIRUB SERPL-MCNC: 0.9 MG/DL (ref 0.2–1)
BNP SERPL-MCNC: 43 PG/ML
BUN SERPL-MCNC: 20 MG/DL (ref 6–20)
BUN/CREAT SERPL: 25 (ref 12–20)
CA-I BLD-MCNC: 10 MG/DL (ref 8.5–10.1)
CALCULATED P AXIS, ECG09: 86 DEGREES
CALCULATED R AXIS, ECG10: 90 DEGREES
CALCULATED T AXIS, ECG11: 69 DEGREES
CHLORIDE SERPL-SCNC: 93 MMOL/L (ref 97–108)
CO2 SERPL-SCNC: 31 MMOL/L (ref 21–32)
CREAT SERPL-MCNC: 0.81 MG/DL (ref 0.55–1.02)
D DIMER PPP FEU-MCNC: <0.27 UG/ML(FEU)
DIAGNOSIS, 93000: NORMAL
DIFFERENTIAL METHOD BLD: NORMAL
EOSINOPHIL # BLD: 0 K/UL (ref 0–0.4)
EOSINOPHIL NFR BLD: 0 % (ref 0–7)
ERYTHROCYTE [DISTWIDTH] IN BLOOD BY AUTOMATED COUNT: 12.6 % (ref 11.5–14.5)
GLOBULIN SER CALC-MCNC: 4.4 G/DL (ref 2–4)
GLUCOSE SERPL-MCNC: 187 MG/DL (ref 65–100)
HCT VFR BLD AUTO: 42.1 % (ref 35–47)
HGB BLD-MCNC: 14.5 G/DL (ref 11.5–16)
IMM GRANULOCYTES # BLD AUTO: 0 K/UL (ref 0–0.04)
IMM GRANULOCYTES NFR BLD AUTO: 0 % (ref 0–0.5)
INR PPP: 1.1 (ref 0.9–1.1)
LYMPHOCYTES # BLD: 1.3 K/UL (ref 0.8–3.5)
LYMPHOCYTES NFR BLD: 28 % (ref 12–49)
MCH RBC QN AUTO: 29.5 PG (ref 26–34)
MCHC RBC AUTO-ENTMCNC: 34.4 G/DL (ref 30–36.5)
MCV RBC AUTO: 85.7 FL (ref 80–99)
MONOCYTES # BLD: 0.3 K/UL (ref 0–1)
MONOCYTES NFR BLD: 6 % (ref 5–13)
NEUTS SEG # BLD: 3.1 K/UL (ref 1.8–8)
NEUTS SEG NFR BLD: 66 % (ref 32–75)
NRBC # BLD: 0 K/UL (ref 0–0.01)
NRBC BLD-RTO: 0 PER 100 WBC
P-R INTERVAL, ECG05: 130 MS
PLATELET # BLD AUTO: 394 K/UL (ref 150–400)
PMV BLD AUTO: 10.6 FL (ref 8.9–12.9)
POTASSIUM SERPL-SCNC: 3 MMOL/L (ref 3.5–5.1)
PROT SERPL-MCNC: 9 G/DL (ref 6.4–8.2)
PROTHROMBIN TIME: 13.5 SEC (ref 11.9–14.7)
Q-T INTERVAL, ECG07: 358 MS
QRS DURATION, ECG06: 78 MS
QTC CALCULATION (BEZET), ECG08: 452 MS
RBC # BLD AUTO: 4.91 M/UL (ref 3.8–5.2)
SODIUM SERPL-SCNC: 132 MMOL/L (ref 136–145)
TROPONIN I SERPL-MCNC: <0.05 NG/ML
TROPONIN I SERPL-MCNC: <0.05 NG/ML
VENTRICULAR RATE, ECG03: 96 BPM
WBC # BLD AUTO: 4.7 K/UL (ref 3.6–11)

## 2021-05-09 PROCEDURE — 84484 ASSAY OF TROPONIN QUANT: CPT

## 2021-05-09 PROCEDURE — 85025 COMPLETE CBC W/AUTO DIFF WBC: CPT

## 2021-05-09 PROCEDURE — 74011250636 HC RX REV CODE- 250/636: Performed by: NURSE PRACTITIONER

## 2021-05-09 PROCEDURE — 80053 COMPREHEN METABOLIC PANEL: CPT

## 2021-05-09 PROCEDURE — 71046 X-RAY EXAM CHEST 2 VIEWS: CPT

## 2021-05-09 PROCEDURE — 93005 ELECTROCARDIOGRAM TRACING: CPT

## 2021-05-09 PROCEDURE — 36415 COLL VENOUS BLD VENIPUNCTURE: CPT

## 2021-05-09 PROCEDURE — 99285 EMERGENCY DEPT VISIT HI MDM: CPT

## 2021-05-09 PROCEDURE — 96375 TX/PRO/DX INJ NEW DRUG ADDON: CPT

## 2021-05-09 PROCEDURE — 85610 PROTHROMBIN TIME: CPT

## 2021-05-09 PROCEDURE — 74011250637 HC RX REV CODE- 250/637: Performed by: NURSE PRACTITIONER

## 2021-05-09 PROCEDURE — 96361 HYDRATE IV INFUSION ADD-ON: CPT

## 2021-05-09 PROCEDURE — 96374 THER/PROPH/DIAG INJ IV PUSH: CPT

## 2021-05-09 PROCEDURE — 83880 ASSAY OF NATRIURETIC PEPTIDE: CPT

## 2021-05-09 PROCEDURE — 85379 FIBRIN DEGRADATION QUANT: CPT

## 2021-05-09 RX ORDER — FAMOTIDINE 10 MG/ML
20 INJECTION INTRAVENOUS
Status: COMPLETED | OUTPATIENT
Start: 2021-05-09 | End: 2021-05-09

## 2021-05-09 RX ORDER — POTASSIUM CHLORIDE 20 MEQ/1
40 TABLET, EXTENDED RELEASE ORAL
Status: COMPLETED | OUTPATIENT
Start: 2021-05-09 | End: 2021-05-09

## 2021-05-09 RX ORDER — ONDANSETRON 2 MG/ML
4 INJECTION INTRAMUSCULAR; INTRAVENOUS
Status: COMPLETED | OUTPATIENT
Start: 2021-05-09 | End: 2021-05-09

## 2021-05-09 RX ORDER — MORPHINE SULFATE 2 MG/ML
2 INJECTION, SOLUTION INTRAMUSCULAR; INTRAVENOUS
Status: COMPLETED | OUTPATIENT
Start: 2021-05-09 | End: 2021-05-09

## 2021-05-09 RX ORDER — METHOCARBAMOL 750 MG/1
750 TABLET, FILM COATED ORAL ONCE
Status: COMPLETED | OUTPATIENT
Start: 2021-05-09 | End: 2021-05-09

## 2021-05-09 RX ORDER — NITROGLYCERIN 0.4 MG/1
0.4 TABLET SUBLINGUAL
Status: COMPLETED | OUTPATIENT
Start: 2021-05-09 | End: 2021-05-09

## 2021-05-09 RX ADMIN — SODIUM CHLORIDE 1000 ML: 9 INJECTION, SOLUTION INTRAVENOUS at 09:54

## 2021-05-09 RX ADMIN — SODIUM CHLORIDE 1000 ML: 9 INJECTION, SOLUTION INTRAVENOUS at 13:50

## 2021-05-09 RX ADMIN — MORPHINE SULFATE 2 MG: 2 INJECTION, SOLUTION INTRAMUSCULAR; INTRAVENOUS at 11:16

## 2021-05-09 RX ADMIN — NITROGLYCERIN 0.4 MG: 0.4 TABLET, ORALLY DISINTEGRATING SUBLINGUAL at 09:54

## 2021-05-09 RX ADMIN — SODIUM CHLORIDE 1000 ML: 9 INJECTION, SOLUTION INTRAVENOUS at 11:16

## 2021-05-09 RX ADMIN — ONDANSETRON 4 MG: 2 INJECTION INTRAMUSCULAR; INTRAVENOUS at 09:54

## 2021-05-09 RX ADMIN — POTASSIUM CHLORIDE 40 MEQ: 1500 TABLET, EXTENDED RELEASE ORAL at 11:16

## 2021-05-09 RX ADMIN — METHOCARBAMOL 750 MG: 750 TABLET ORAL at 12:17

## 2021-05-09 RX ADMIN — FAMOTIDINE 20 MG: 10 INJECTION, SOLUTION INTRAVENOUS at 09:54

## 2021-05-09 NOTE — DISCHARGE INSTRUCTIONS
Thank you! Thank you for allowing me to care for you in the emergency department. I sincerely hope that you are satisfied with your visit today. It is my goal to provide you with excellent care. Below you will find a list of your labs and imaging from your visit today. Should you have any questions regarding these results please do not hesitate to call the emergency department. Labs -     Recent Results (from the past 12 hour(s))   CBC WITH AUTOMATED DIFF    Collection Time: 05/09/21  9:35 AM   Result Value Ref Range    WBC 4.7 3.6 - 11.0 K/uL    RBC 4.91 3.80 - 5.20 M/uL    HGB 14.5 11.5 - 16.0 g/dL    HCT 42.1 35.0 - 47.0 %    MCV 85.7 80.0 - 99.0 FL    MCH 29.5 26.0 - 34.0 PG    MCHC 34.4 30.0 - 36.5 g/dL    RDW 12.6 11.5 - 14.5 %    PLATELET 197 661 - 214 K/uL    MPV 10.6 8.9 - 12.9 FL    NRBC 0.0 0.0  WBC    ABSOLUTE NRBC 0.00 0.00 - 0.01 K/uL    NEUTROPHILS 66 32 - 75 %    LYMPHOCYTES 28 12 - 49 %    MONOCYTES 6 5 - 13 %    EOSINOPHILS 0 0 - 7 %    BASOPHILS 0 0 - 1 %    IMMATURE GRANULOCYTES 0 0 - 0.5 %    ABS. NEUTROPHILS 3.1 1.8 - 8.0 K/UL    ABS. LYMPHOCYTES 1.3 0.8 - 3.5 K/UL    ABS. MONOCYTES 0.3 0.0 - 1.0 K/UL    ABS. EOSINOPHILS 0.0 0.0 - 0.4 K/UL    ABS. BASOPHILS 0.0 0.0 - 0.1 K/UL    ABS. IMM. GRANS. 0.0 0.00 - 0.04 K/UL    DF AUTOMATED     METABOLIC PANEL, COMPREHENSIVE    Collection Time: 05/09/21  9:35 AM   Result Value Ref Range    Sodium 132 (L) 136 - 145 mmol/L    Potassium 3.0 (L) 3.5 - 5.1 mmol/L    Chloride 93 (L) 97 - 108 mmol/L    CO2 31 21 - 32 mmol/L    Anion gap 8 5 - 15 mmol/L    Glucose 187 (H) 65 - 100 mg/dL    BUN 20 6 - 20 mg/dL    Creatinine 0.81 0.55 - 1.02 mg/dL    BUN/Creatinine ratio 25 (H) 12 - 20      GFR est AA >60 >60 ml/min/1.73m2    GFR est non-AA >60 >60 ml/min/1.73m2    Calcium 10.0 8.5 - 10.1 mg/dL    Bilirubin, total 0.9 0.2 - 1.0 mg/dL    AST (SGOT) 7 (L) 15 - 37 U/L    ALT (SGPT) 18 12 - 78 U/L    Alk.  phosphatase 72 45 - 117 U/L    Protein, total 9.0 (H) 6.4 - 8.2 g/dL    Albumin 4.6 3.5 - 5.0 g/dL    Globulin 4.4 (H) 2.0 - 4.0 g/dL    A-G Ratio 1.0 (L) 1.1 - 2.2     TROPONIN I    Collection Time: 05/09/21  9:35 AM   Result Value Ref Range    Troponin-I, Qt. <0.05 <0.05 ng/mL   PROTHROMBIN TIME + INR    Collection Time: 05/09/21  9:35 AM   Result Value Ref Range    Prothrombin time 13.5 11.9 - 14.7 sec    INR 1.1 0.9 - 1.1     BNP    Collection Time: 05/09/21  9:35 AM   Result Value Ref Range    NT pro-BNP 43 <125 pg/mL   TROPONIN I    Collection Time: 05/09/21 12:19 PM   Result Value Ref Range    Troponin-I, Qt. <0.05 <0.05 ng/mL   D DIMER    Collection Time: 05/09/21 12:55 PM   Result Value Ref Range    D DIMER <0.27 <0.50 ug/ml(FEU)       Radiologic Studies -   XR CHEST PA LAT   Final Result   No infiltrates. 2 view study compared to 2/16/2021. Monitoring equipment overlies the body. Narrow heart and mediastinum. Left-sided well-defined aorta. No mediastinal   adenopathy or shift. The lungs are well-expanded and there may be air trapping. No consolidation or   lung mass or nodules. No radiopaque foreign bodies. Negative for pulmonary edema, pleural effusion or pneumothorax. No bony lesion. Negative for subdiaphragmatic free air. CT Results  (Last 48 hours)      None          CXR Results  (Last 48 hours)                 05/09/21 0952  XR CHEST PA LAT Final result    Impression:  No infiltrates. 2 view study compared to 2/16/2021. Monitoring equipment overlies the body. Narrow heart and mediastinum. Left-sided well-defined aorta. No mediastinal   adenopathy or shift. The lungs are well-expanded and there may be air trapping. No consolidation or   lung mass or nodules. No radiopaque foreign bodies. Negative for pulmonary edema, pleural effusion or pneumothorax. No bony lesion. Negative for subdiaphragmatic free air.        Narrative:  Chest.                      If you feel that you have not received excellent quality care or timely care, please ask to speak to the nurse manager. Please choose us in the future for your continued health care needs. ------------------------------------------------------------------------------------------------------------  The exam and treatment you received in the Emergency Department were for an urgent problem and are not intended as complete care. It is important that you follow-up with a doctor, nurse practitioner, or physician assistant to:  (1) confirm your diagnosis,  (2) re-evaluation of changes in your illness and treatment, and  (3) for ongoing care. If your symptoms become worse or you do not improve as expected and you are unable to reach your usual health care provider, you should return to the Emergency Department. We are available 24 hours a day. Please take your discharge instructions with you when you go to your follow-up appointment. If you have any problem arranging a follow-up appointment, contact the Emergency Department immediately. If a prescription has been provided, please have it filled as soon as possible to prevent a delay in treatment. Read the entire medication instruction sheet provided to you by the pharmacy. If you have any questions or reservations about taking the medication due to side effects or interactions with other medications, please call your primary care physician or contact the ER to speak with the charge nurse. Make an appointment with your family doctor or the physician you were referred to for follow-up of this visit as instructed on your discharge paperwork, as this is a mandatory follow-up. Return to the ER if you are unable to be seen or if you are unable to be seen in a timely manner. If you have any problem arranging the follow-up visit, contact the Emergency Department immediately.

## 2021-05-09 NOTE — ED NOTES
Pt ambulated A&Ox4, GCS 15 to Ed lobby to await medicaid cab called by ÁNGEL Tamayo. IV removed. In possession of personal belongings, discharge instructions, work note.

## 2021-05-09 NOTE — ED PROVIDER NOTES
OFFICE PROGRESS NOTE      10/15/2019    HISTORY  Source of history is the patient and other sources including family members, computerized patient record and old paper records as needed.      Carrie Paige is a 55 year old female who presents for follow-up of multiple medical problems.  Her main problem continues to be severe phantom limb pain involving the right lower extremity.  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  She has a prosthesis but has severe pain in right foot and lower leg.  She also has chronic low back pain following lumbar spine fusion surgery.  She takes oxycodone 15 mg 4 times daily and uses tizanidine on a p.r.n. basis.  She also takes gabapentin.  This combination controls her pain fairly well.  We have had a discussion about ongoing narcotic therapy and she is very willing to try to taper and eventually hopefully discontinue the oxycodone.  Her dose of oxycodone was increased at the time of her back surgery but she is now back to 15 mg 4 times daily.  She is willing to cut back to 10 mg 4 times daily next month.  She has type 1 diabetes.  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  She has hypercholesterolemia.  She takes simvastatin 20 mg daily.  She did not tolerate Ace inhibitors because of hyperkalemia and renal failure.  She does have nonocclusive coronary artery disease.  She has a severe stenosis in her distal LAD but is felt to be a candidate for medical therapy.  She has a long history of depression.  According to her records she is taking Wellbutrin and Effexor.  She denies vegetative symptoms of depression and has no hallucinations or delusions.  She has vitamin-D deficiency.  She takes ergo calciferol.  She has no history of  EMERGENCY DEPARTMENT HISTORY AND PHYSICAL EXAM      Date: 5/9/2021  Patient Name: Jayashree Ashby      History of Presenting Illness     Chief Complaint   Patient presents with    Chest Pain       History Provided By: Patient    HPI: Jayashree Ashby, 36 y.o. female with a past medical history significant diabetes and hypertension presents to the ED with cc of midline chest pain that developed at 1 AM with intermittent shortness of breath with epigastric abdominal pain, vomiting x 1 episdoe. She reports \"feels like a knee in my chest \". She reports symptoms woke her up out of her sleep. Reports pain 7 out of 10 now 6 out of 10 reports receiving 324 baby aspirin in route by EMS. Denies any alleviating or aggravating factors or radiation of pain. She denies any nausea, vomiting, abdominal pain, diaphoresis, lightheaded, dizzy, numbness or tingling, change in vision. Off note patient current smoker 1 pack every 2 days and does admit to use of marijuana. Patient reports similar episode in November was admitted with cardiac work-up advised of symptoms being related to dehydration. There are no other complaints, changes, or physical findings at this time. PCP: Rory, MD Cecilia    Current Outpatient Medications   Medication Sig Dispense Refill    Blood-Glucose Meter (True Metrix Glucose Meter) misc Check glucose once a day 1 Each 0    glucose blood VI test strips (True Metrix Glucose Test Strip) strip Check glucose once a day 50 Strip 5    lancets misc Check glucose once a day 50 Each 5    metFORMIN (GLUCOPHAGE) 1,000 mg tablet Take 1 Tab by mouth two (2) times daily (with meals). 60 Tab 3    metoprolol succinate (TOPROL-XL) 25 mg XL tablet Take 2 Tabs by mouth daily.  Indications: paroxysmal supraventricular tachycardia 30 Tab 1    Blood-Glucose Meter monitoring kit Check once a day 1 Kit 0    glucose blood VI test strips (OneTouch Verio test strips) strip Check once a day 50 Strip 5    lancets insufficiency fracture.  She has a history of recurring urinary tract infections.  Her urogynecologist recently left [facility] and she wants to find a different female urologist.  She was recently treated for urinary tract infection with nitrofurantoin and subsequently amoxicillin.  She took her last dose of amoxicillin four days ago.  She is beginning to feel symptoms of dysuria.  Treatment of her recurrent urinary tract infections has been complicated by Clostridium difficile diarrhea.        I have reviewed the past medical, family and social history sections including the medications and allergies listed in the above medical record as well as the nursing notes.     REVIEW OF SYSTEMS     Body mass index is 27.12 kg/m².  BMI ASSESSMENT/PLAN:  Journal food intake daily   A PHQ-2 score ranges from 0-6.  A cutoff point of 3 is felt to have the best combination of sensitivity and specificity.    PHQ 2:  Date Adult PHQ 2 Score   6/13/2019 0       PHQ 9:     DEPRESSION ASSESSMENT/PLAN:  Start and/or continue medication.  See orders for details.     Constitutional: Denies fever, chills, night sweats or weight loss  HEENT:  Denies headache.  Denies change in visual acuity, eye pain, or diplopia.  Denies tinnitus, vertigo or hearing loss.  Denies nasal congestion, postnasal drip or sore throat.  Respiratory:  Denies cough, sputum production or shortness of breath.    Cardiovascular:  Denies chest pain, dyspnea, palpitations or edema.    GI:  Denies abdominal pain, nausea, vomiting, heartburn, dysphagia, change in bowels or bloody stool.    :  Denies dysuria, urinary frequency, blood in urine or nocturia.    Musculoskeletal:  Denies back pain, neck pain or joint pain.    Integument:  Denies rash or itching.    Neurologic:  Denies numbness, tingling, weakness, change in mental status or gait instability.    Endocrine:  Denies polyuria, polydipsia or hot/cold intolerance.    Lymphatic:  Denies swollen glands.      misc Check once a day 50 Each 5    ondansetron (Zofran ODT) 4 mg disintegrating tablet Take 1 Tab by mouth every eight (8) hours as needed for Nausea or Vomiting. 10 Tab 1    butalbital-acetaminophen-caff (Fioricet) -40 mg per capsule Take 1 Cap by mouth every four (4) hours as needed for Headache for up to 10 doses. 10 Cap 0       Past History     Past Medical History:  Past Medical History:   Diagnosis Date    Cholesterol serum increased 5/19/2010    Diabetes mellitus ([address] 5/19/2010    HTN (hypertension), benign 5/19/2010    Vitamin D deficiency 11/16/2020       Past Surgical History:  Past Surgical History:   Procedure Laterality Date    HX GYN      s/p Hysterectomy       Family History:  Family History   Problem Relation Age of Onset    Diabetes Mother     Cancer Father     Alcohol abuse Neg Hx     Arthritis-rheumatoid Neg Hx     Asthma Neg Hx     Bleeding Prob Neg Hx     Elevated Lipids Neg Hx     Headache Neg Hx     Heart Disease Neg Hx     Hypertension Neg Hx     Lung Disease Neg Hx     Migraines Neg Hx     Psychiatric Disorder Neg Hx     Stroke Neg Hx     Mental Retardation Neg Hx        Social History:  Social History     Tobacco Use    Smoking status: Current Every Day Smoker     Packs/day: 1.00    Smokeless tobacco: Never Used   Substance Use Topics    Alcohol use: No    Drug use: No       Allergies:  No Known Allergies      Review of Systems     Review of Systems   Constitutional: Negative for chills and fever. HENT: Negative for congestion, sinus pressure and sinus pain. Respiratory: Positive for shortness of breath. Negative for cough. Cardiovascular: Positive for chest pain. Negative for leg swelling. Gastrointestinal: Positive for abdominal pain and nausea. Negative for vomiting. Genitourinary: Negative for dysuria, frequency and urgency. Musculoskeletal: Negative for arthralgias and myalgias. Skin: Negative.     Neurological: Negative for dizziness,     PHYSICAL EXAM    Vital Signs:   Visit Vitals  /60   Pulse 60   Resp 12   Ht 5' 10\" (1.778 m)   Wt 85.7 kg   LMP 08/18/2014   BMI 27.12 kg/m²     Constitutional:  Well-developed white female no acute distress  Integument:  Warm.  Dry.  No erythema.  No rash.    HEENT:  Normocephalic.  Atraumatic.  PERRLA, EOMI.  Bilateral external ears and canals normal. Tympanic membranes are normal. Oropharynx moist.  No oral exudates.  Nose normal.   Neck:  Normal range of motion.  No tenderness.  Supple.  No goiter.  No carotid bruits.  Cardiovascular:  Normal heart rate.  Regular rhythm.  No murmurs.  No rubs, No gallops.  DP pulses intact.   Respiratory:  No respiratory distress.  Normal breath sounds.  No wheezing.  No chest wall tenderness.    Lymph Nodes:   No axillary, cervical or inguinal adenopathy.  GI:  Bowel sounds normal.  Soft, nondistended.  No tenderness.  No masses.  No enlargement of the liver or spleen.  No pulsatile masses.  MSK:  No joint swelling, redness or warmth.   Neurologic:  Alert & oriented x3.  Mental status normal.  Normal motor function.  Normal sensory function.  DTR 2+/4+ and symmetric.  Normal gait and station.        ASSESSMENT    Type 1 diabetes mellitus  Phantom limb pain  Chronic low back pain  Hypercholesterolemia  Depression    PLAN    She should continue her current medications.  She should return to see me in about a month at which point we will make an effort to decrease her dose of oxycodone.  She seems to be genuinely interested in eventually discontinuing the oxycodone.  She should continue her gabapentin, tizanidine and her antidepressant therapy.  She should have lab tests in about a month.  I will contact her with the results of her lab tests.  I recommended that she follow up with her cardiologist Dr. Mckinley Pacheco. I recommended that she  Dr. Carlos A Patel.     The patient indicates understanding of these plans and agrees to proceed as outlined.   weakness, light-headedness, numbness and headaches. Psychiatric/Behavioral: Negative. All other systems reviewed and are negative. Physical Exam     Physical Exam  Vitals signs and nursing note reviewed. Constitutional:       General: She is not in acute distress. Appearance: Normal appearance. She is normal weight. She is not ill-appearing or toxic-appearing. HENT:      Head: Normocephalic and atraumatic. Right Ear: Hearing normal.      Left Ear: Hearing normal.      Nose: Nose normal.      Mouth/Throat:      Mouth: Mucous membranes are moist.   Eyes:      General: Lids are normal.      Extraocular Movements: Extraocular movements intact. Pupils: Pupils are equal, round, and reactive to light. Neck:      Musculoskeletal: Normal range of motion and neck supple. No muscular tenderness. Cardiovascular:      Rate and Rhythm: Normal rate and regular rhythm. Pulses: Normal pulses. Radial pulses are 2+ on the right side and 2+ on the left side. Dorsalis pedis pulses are 2+ on the right side and 2+ on the left side. Heart sounds: Normal heart sounds. Pulmonary:      Effort: Pulmonary effort is normal. No accessory muscle usage or respiratory distress. Breath sounds: Normal breath sounds. No wheezing or rhonchi. Chest:      Chest wall: No tenderness. Abdominal:      General: Bowel sounds are normal.      Palpations: Abdomen is soft. Tenderness: There is abdominal tenderness in the epigastric area. There is no right CVA tenderness or left CVA tenderness. Musculoskeletal: Normal range of motion. Right lower leg: No edema. Left lower leg: No edema. Feet:      Right foot:      Skin integrity: No skin breakdown. Left foot:      Skin integrity: No skin breakdown. Skin:     General: Skin is warm and dry. Capillary Refill: Capillary refill takes less than 2 seconds.       Findings: No abrasion, bruising, ecchymosis, erythema or signs of injury. Neurological:      Mental Status: She is alert and oriented to person, place, and time. GCS: GCS eye subscore is 4. GCS verbal subscore is 5. GCS motor subscore is 6. Cranial Nerves: Cranial nerves are intact. Sensory: Sensation is intact. Psychiatric:         Attention and Perception: Attention normal.         Mood and Affect: Mood normal.         Behavior: Behavior normal. Behavior is cooperative. Cognition and Memory: Cognition normal.         Lab and Diagnostic Study Results     Labs -     Recent Results (from the past 12 hour(s))   CBC WITH AUTOMATED DIFF    Collection Time: 05/09/21  9:35 AM   Result Value Ref Range    WBC 4.7 3.6 - 11.0 K/uL    RBC 4.91 3.80 - 5.20 M/uL    HGB 14.5 11.5 - 16.0 g/dL    HCT 42.1 35.0 - 47.0 %    MCV 85.7 80.0 - 99.0 FL    MCH 29.5 26.0 - 34.0 PG    MCHC 34.4 30.0 - 36.5 g/dL    RDW 12.6 11.5 - 14.5 %    PLATELET 263 671 - 347 K/uL    MPV 10.6 8.9 - 12.9 FL    NRBC 0.0 0.0  WBC    ABSOLUTE NRBC 0.00 0.00 - 0.01 K/uL    NEUTROPHILS 66 32 - 75 %    LYMPHOCYTES 28 12 - 49 %    MONOCYTES 6 5 - 13 %    EOSINOPHILS 0 0 - 7 %    BASOPHILS 0 0 - 1 %    IMMATURE GRANULOCYTES 0 0 - 0.5 %    ABS. NEUTROPHILS 3.1 1.8 - 8.0 K/UL    ABS. LYMPHOCYTES 1.3 0.8 - 3.5 K/UL    ABS. MONOCYTES 0.3 0.0 - 1.0 K/UL    ABS. EOSINOPHILS 0.0 0.0 - 0.4 K/UL    ABS. BASOPHILS 0.0 0.0 - 0.1 K/UL    ABS. IMM.  GRANS. 0.0 0.00 - 0.04 K/UL    DF AUTOMATED     METABOLIC PANEL, COMPREHENSIVE    Collection Time: 05/09/21  9:35 AM   Result Value Ref Range    Sodium 132 (L) 136 - 145 mmol/L    Potassium 3.0 (L) 3.5 - 5.1 mmol/L    Chloride 93 (L) 97 - 108 mmol/L    CO2 31 21 - 32 mmol/L    Anion gap 8 5 - 15 mmol/L    Glucose 187 (H) 65 - 100 mg/dL    BUN 20 6 - 20 mg/dL    Creatinine 0.81 0.55 - 1.02 mg/dL    BUN/Creatinine ratio 25 (H) 12 - 20      GFR est AA >60 >60 ml/min/1.73m2    GFR est non-AA >60 >60 ml/min/1.73m2    Calcium 10.0 8.5 - 10.1 mg/dL    Bilirubin, total 0.9 0.2 - 1.0 mg/dL    AST (SGOT) 7 (L) 15 - 37 U/L    ALT (SGPT) 18 12 - 78 U/L    Alk. phosphatase 72 45 - 117 U/L    Protein, total 9.0 (H) 6.4 - 8.2 g/dL    Albumin 4.6 3.5 - 5.0 g/dL    Globulin 4.4 (H) 2.0 - 4.0 g/dL    A-G Ratio 1.0 (L) 1.1 - 2.2     TROPONIN I    Collection Time: 05/09/21  9:35 AM   Result Value Ref Range    Troponin-I, Qt. <0.05 <0.05 ng/mL   PROTHROMBIN TIME + INR    Collection Time: 05/09/21  9:35 AM   Result Value Ref Range    Prothrombin time 13.5 11.9 - 14.7 sec    INR 1.1 0.9 - 1.1     BNP    Collection Time: 05/09/21  9:35 AM   Result Value Ref Range    NT pro-BNP 43 <125 pg/mL   TROPONIN I    Collection Time: 05/09/21 12:19 PM   Result Value Ref Range    Troponin-I, Qt. <0.05 <0.05 ng/mL   D DIMER    Collection Time: 05/09/21 12:55 PM   Result Value Ref Range    D DIMER <0.27 <0.50 ug/ml(FEU)       Radiologic Studies -   [unfilled]  CT Results  (Last 48 hours)    None        CXR Results  (Last 48 hours)               05/09/21 0952  XR CHEST PA LAT Final result    Impression:  No infiltrates. 2 view study compared to 2/16/2021. Monitoring equipment overlies the body. Narrow heart and mediastinum. Left-sided well-defined aorta. No mediastinal   adenopathy or shift. The lungs are well-expanded and there may be air trapping. No consolidation or   lung mass or nodules. No radiopaque foreign bodies. Negative for pulmonary edema, pleural effusion or pneumothorax. No bony lesion. Negative for subdiaphragmatic free air. Narrative:  Chest.                 Medical Decision Making and ED Course   - I am the first and primary provider for this patient AND AM THE PRIMARY PROVIDER OF RECORD. - I reviewed the vital signs, available nursing notes, past medical history, past surgical history, family history and social history. - Initial assessment performed.  The patients presenting problems have been discussed, and the staff are in agreement with the care plan formulated and outlined with them. I have encouraged them to ask questions as they arise throughout their visit. Vital Signs-Reviewed the patient's vital signs. Patient Vitals for the past 12 hrs:   Temp Pulse Resp BP SpO2   05/09/21 1452 -- 94 18 (!) 164/97 100 %   05/09/21 1122 -- 94 20 (!) 173/99 100 %   05/09/21 1044 -- 100 18 (!) 172/117 100 %   05/09/21 0926 98.6 °F (37 °C) (!) 103 18 137/86 100 %       EKG interpretation: (Preliminary): Performed at 0927, and read at 0927  Rhythm: normal sinus rhythm; and regular . Rate (approx.): 96; Axis: rightward; VT interval: normal; QRS interval: normal ; ST/T wave: normal; Other findings: possible left atrial enlargement. Records Reviewed: Old Medical Records    The patient presents with chest pain with a differential diagnosis of  abnormal EKG, arrhythmia, angina, chest wall pain, costochondritis, dyspnea and GERD    ED Course:       ED Course as of May 09 1459   Sun May 09, 2021   1446 Reports pain has since resolved. Ready to go to home    [AM]      ED Course User Index  [AM] Dawson Mendez NP         Provider Notes (Medical Decision Making):   React work-up negative. Trope negative x2, chest x-ray within normal limits, D-dimer negative, basic labs within normal limits. She reports symptoms has since resolved status post morphine, nitro, muscle relaxer. She reports history of similar episode in the past with diagnosis of dehydration. Patient given IV fluids. Advised to follow-up with cardiologist outpatient verbalized understanding stable at time of discharge          Consultations:       Consultations:        Procedures and [facility], NP        Disposition     Disposition: DC- Adult Discharges: All of the diagnostic tests were reviewed and questions answered. Diagnosis, care plan and treatment options were discussed. The patient understands the instructions and will follow up as directed.  The patients results have been reviewed with them. They have been counseled regarding their diagnosis. The patient verbally convey understanding and agreement of the signs, symptoms, diagnosis, treatment and prognosis and additionally agrees to follow up as recommended with their PCP in 24 - 48 hours. They also agree with the care-plan and convey that all of their questions have been answered. I have also put together some discharge instructions for them that include: 1) educational information regarding their diagnosis, 2) how to care for their diagnosis at home, as well a 3) list of reasons why they would want to return to the ED prior to their follow-up appointment, should their condition change. Discharged  DISCHARGE PLAN:  1. Current Discharge Medication List      CONTINUE these medications which have NOT CHANGED    Details   Blood-Glucose Meter (True Metrix Glucose Meter) misc Check glucose once a day  Qty: 1 Each, Refills: 0    Associated Diagnoses: Type 2 diabetes mellitus with hyperglycemia, without long-term current use of insulin ([address]      ! ! glucose blood VI test strips (True Metrix Glucose Test Strip) strip Check glucose once a day  Qty: 50 Strip, Refills: 5    Associated Diagnoses: Type 2 diabetes mellitus with hyperglycemia, without long-term current use of insulin ([address]      ! ! lancets misc Check glucose once a day  Qty: 50 Each, Refills: 5    Associated Diagnoses: Type 2 diabetes mellitus with hyperglycemia, without long-term current use of insulin ([facility])      metFORMIN (GLUCOPHAGE) 1,000 mg tablet Take 1 Tab by mouth two (2) times daily (with meals). Qty: 60 Tab, Refills: 3    Associated Diagnoses: Type 2 diabetes mellitus with hyperglycemia, without long-term current use of insulin ([facility])      metoprolol succinate (TOPROL-XL) 25 mg XL tablet Take 2 Tabs by mouth daily.  Indications: paroxysmal supraventricular tachycardia  Qty: 30 Tab, Refills: 1    Associated Diagnoses: PSVT (paroxysmal supraventricular tachycardia) ([address]      Blood-Glucose Meter monitoring kit Check once a day  Qty: 1 Kit, Refills: 0    Associated Diagnoses: Type 2 diabetes mellitus with hyperglycemia, without long-term current use of insulin ([address]      ! ! glucose blood VI test strips (OneTouch Verio test strips) strip Check once a day  Qty: 50 Strip, Refills: 5    Associated Diagnoses: Type 2 diabetes mellitus with hyperglycemia, without long-term current use of insulin ([address]      ! ! lancets misc Check once a day  Qty: 50 Each, Refills: 5    Associated Diagnoses: Type 2 diabetes mellitus with hyperglycemia, without long-term current use of insulin ([facility])      ondansetron (Zofran ODT) 4 mg disintegrating tablet Take 1 Tab by mouth every eight (8) hours as needed for Nausea or Vomiting. Qty: 10 Tab, Refills: 1      butalbital-acetaminophen-caff (Fioricet) -40 mg per capsule Take 1 Cap by mouth every four (4) hours as needed for Headache for up to 10 doses. Qty: 10 Cap, Refills: 0       !! - Potential duplicate medications found. Please discuss with provider. 2.   Follow-up Information     Follow up With Specialties Details Why Contact Info    Mark Whitfield MD Cardiology Schedule an appointment as soon as possible for a visit in 1 week chest pain [address] Michelet Wasserman NP Nurse Practitioner Schedule an appointment as soon as possible for a visit in 1 week  [address] [place] [address]  [phone]          3. Return to ED if worse   4. Current Discharge Medication List          Diagnosis     Clinical Impression:   1. Atypical chest pain    2. Hypokalemia        Attestations:    Richie Hadley NP    Please note that this dictation was completed with [employer], the computer voice recognition software. Quite often unanticipated grammatical, syntax, homophones, and other interpretive errors are inadvertently transcribed by the computer software. Please disregard these errors. Please excuse any errors that have escaped final proofreading. Thank you.

## 2021-05-09 NOTE — LETTER
[address] [place] [address] EMERGENCY DEPT 
[facility] [address] [address] 
[phone] Work/School Note Date: 5/9/2021 To Whom It May concern: 
 
Catalina Frost was seen and treated today in the emergency room by the following provider(s): 
Nurse Practitioner: Mk Bradshaw NP. Catalina Frost is excused from work/school on 05/09/21 and 05/10/21. She is medically clear to return to work/school on 5/11/2021. Sincerely, Franny Rios NP

## 2021-05-11 ENCOUNTER — APPOINTMENT (OUTPATIENT)
Dept: CT IMAGING | Age: 41
End: 2021-05-11
Attending: EMERGENCY MEDICINE
Payer: MEDICAID

## 2021-05-11 ENCOUNTER — HOSPITAL ENCOUNTER (EMERGENCY)
Age: 41
Discharge: HOME | End: 2021-05-11
Attending: EMERGENCY MEDICINE | Admitting: EMERGENCY MEDICINE
Payer: MEDICAID

## 2021-05-11 VITALS
BODY MASS INDEX: 20.89 KG/M2 | TEMPERATURE: 98.4 F | HEIGHT: 66 IN | WEIGHT: 130 LBS | DIASTOLIC BLOOD PRESSURE: 98 MMHG | SYSTOLIC BLOOD PRESSURE: 161 MMHG | OXYGEN SATURATION: 100 % | RESPIRATION RATE: 16 BRPM | HEART RATE: 86 BPM

## 2021-05-11 DIAGNOSIS — R11.2 NON-INTRACTABLE VOMITING WITH NAUSEA, UNSPECIFIED VOMITING TYPE: Primary | ICD-10-CM

## 2021-05-11 LAB
ALBUMIN SERPL-MCNC: 4.6 G/DL (ref 3.5–5)
ALBUMIN/GLOB SERPL: 1 {RATIO} (ref 1.1–2.2)
ALP SERPL-CCNC: 69 U/L (ref 45–117)
ALT SERPL-CCNC: 20 U/L (ref 12–78)
ANION GAP SERPL CALC-SCNC: 10 MMOL/L (ref 5–15)
AST SERPL W P-5'-P-CCNC: 14 U/L (ref 15–37)
BASOPHILS # BLD: 0.1 K/UL (ref 0–0.1)
BASOPHILS NFR BLD: 1 % (ref 0–1)
BILIRUB SERPL-MCNC: 1 MG/DL (ref 0.2–1)
BNP SERPL-MCNC: 90 PG/ML
BUN SERPL-MCNC: 15 MG/DL (ref 6–20)
BUN/CREAT SERPL: 20 (ref 12–20)
CA-I BLD-MCNC: 9.8 MG/DL (ref 8.5–10.1)
CHLORIDE SERPL-SCNC: 94 MMOL/L (ref 97–108)
CO2 SERPL-SCNC: 27 MMOL/L (ref 21–32)
CREAT SERPL-MCNC: 0.76 MG/DL (ref 0.55–1.02)
DIFFERENTIAL METHOD BLD: NORMAL
EOSINOPHIL # BLD: 0 K/UL (ref 0–0.4)
EOSINOPHIL NFR BLD: 0 % (ref 0–7)
ERYTHROCYTE [DISTWIDTH] IN BLOOD BY AUTOMATED COUNT: 12.6 % (ref 11.5–14.5)
GLOBULIN SER CALC-MCNC: 4.6 G/DL (ref 2–4)
GLUCOSE SERPL-MCNC: 161 MG/DL (ref 65–100)
HCT VFR BLD AUTO: 42.7 % (ref 35–47)
HGB BLD-MCNC: 15 G/DL (ref 11.5–16)
IMM GRANULOCYTES # BLD AUTO: 0 K/UL (ref 0–0.04)
IMM GRANULOCYTES NFR BLD AUTO: 0 % (ref 0–0.5)
LYMPHOCYTES # BLD: 2.2 K/UL (ref 0.8–3.5)
LYMPHOCYTES NFR BLD: 32 % (ref 12–49)
MAGNESIUM SERPL-MCNC: 1.9 MG/DL (ref 1.6–2.4)
MCH RBC QN AUTO: 30 PG (ref 26–34)
MCHC RBC AUTO-ENTMCNC: 35.1 G/DL (ref 30–36.5)
MCV RBC AUTO: 85.4 FL (ref 80–99)
MONOCYTES # BLD: 0.6 K/UL (ref 0–1)
MONOCYTES NFR BLD: 9 % (ref 5–13)
NEUTS SEG # BLD: 4 K/UL (ref 1.8–8)
NEUTS SEG NFR BLD: 58 % (ref 32–75)
NRBC # BLD: 0 K/UL (ref 0–0.01)
NRBC BLD-RTO: 0 PER 100 WBC
PHOSPHATE SERPL-MCNC: 2.2 MG/DL (ref 2.6–4.7)
PLATELET # BLD AUTO: 336 K/UL (ref 150–400)
PMV BLD AUTO: 10.8 FL (ref 8.9–12.9)
POTASSIUM SERPL-SCNC: 2.8 MMOL/L (ref 3.5–5.1)
PROT SERPL-MCNC: 9.2 G/DL (ref 6.4–8.2)
RBC # BLD AUTO: 5 M/UL (ref 3.8–5.2)
SODIUM SERPL-SCNC: 131 MMOL/L (ref 136–145)
TROPONIN I SERPL-MCNC: <0.05 NG/ML
WBC # BLD AUTO: 7 K/UL (ref 3.6–11)

## 2021-05-11 PROCEDURE — 74011250637 HC RX REV CODE- 250/637: Performed by: EMERGENCY MEDICINE

## 2021-05-11 PROCEDURE — 74011000636 HC RX REV CODE- 636: Performed by: EMERGENCY MEDICINE

## 2021-05-11 PROCEDURE — 99284 EMERGENCY DEPT VISIT MOD MDM: CPT

## 2021-05-11 PROCEDURE — 74011250636 HC RX REV CODE- 250/636: Performed by: EMERGENCY MEDICINE

## 2021-05-11 PROCEDURE — 96374 THER/PROPH/DIAG INJ IV PUSH: CPT

## 2021-05-11 PROCEDURE — 84484 ASSAY OF TROPONIN QUANT: CPT

## 2021-05-11 PROCEDURE — 83880 ASSAY OF NATRIURETIC PEPTIDE: CPT

## 2021-05-11 PROCEDURE — 83735 ASSAY OF MAGNESIUM: CPT

## 2021-05-11 PROCEDURE — 85025 COMPLETE CBC W/AUTO DIFF WBC: CPT

## 2021-05-11 PROCEDURE — 80053 COMPREHEN METABOLIC PANEL: CPT

## 2021-05-11 PROCEDURE — 36415 COLL VENOUS BLD VENIPUNCTURE: CPT

## 2021-05-11 PROCEDURE — 84100 ASSAY OF PHOSPHORUS: CPT

## 2021-05-11 PROCEDURE — 74177 CT ABD & PELVIS W/CONTRAST: CPT

## 2021-05-11 RX ORDER — METOCLOPRAMIDE HYDROCHLORIDE 5 MG/ML
5 INJECTION INTRAMUSCULAR; INTRAVENOUS
Status: COMPLETED | OUTPATIENT
Start: 2021-05-11 | End: 2021-05-11

## 2021-05-11 RX ORDER — POTASSIUM CHLORIDE 1.5 G/1.77G
60 POWDER, FOR SOLUTION ORAL
Status: COMPLETED | OUTPATIENT
Start: 2021-05-11 | End: 2021-05-11

## 2021-05-11 RX ORDER — POLYETHYLENE GLYCOL 3350 17 G/17G
17 POWDER, FOR SOLUTION ORAL DAILY
Qty: 235 G | Refills: 0 | Status: SHIPPED | OUTPATIENT
Start: 2021-05-11

## 2021-05-11 RX ORDER — METOCLOPRAMIDE 5 MG/1
5 TABLET ORAL
Qty: 20 TAB | Refills: 0 | Status: SHIPPED | OUTPATIENT
Start: 2021-05-11 | End: 2021-05-21

## 2021-05-11 RX ORDER — SODIUM,POTASSIUM PHOSPHATES 280-250MG
2 POWDER IN PACKET (EA) ORAL ONCE
Status: COMPLETED | OUTPATIENT
Start: 2021-05-11 | End: 2021-05-11

## 2021-05-11 RX ADMIN — POTASSIUM CHLORIDE 60 MEQ: 1.5 FOR SOLUTION ORAL at 10:47

## 2021-05-11 RX ADMIN — IOPAMIDOL 100 ML: 755 INJECTION, SOLUTION INTRAVENOUS at 10:57

## 2021-05-11 RX ADMIN — POTASSIUM & SODIUM PHOSPHATES POWDER PACK 280-160-250 MG 2 PACKET: 280-160-250 PACK at 12:22

## 2021-05-11 RX ADMIN — METOCLOPRAMIDE HYDROCHLORIDE 5 MG: 5 INJECTION INTRAMUSCULAR; INTRAVENOUS at 09:26

## 2021-05-11 RX ADMIN — SODIUM CHLORIDE 1000 ML: 9 INJECTION, SOLUTION INTRAVENOUS at 09:22

## 2021-05-11 NOTE — ED TRIAGE NOTES
Hx of dm and htn, pt reports has not eaten since Friday, attempted to have soup but keeps vomiting. Pt generally weak and lethargic. .

## 2021-05-11 NOTE — ED PROVIDER NOTES
HPI   Chief Complaint   Patient presents with    Fatigue    Vomiting   40yoF hx DM and hysterectomy presents with fatigue and vomiting. Patient reports severe constant nausea and moderate intensity burning epigastric pain for the past 4 days, whenever she takes PO she will vomit the food or liquid back up immediately. Patient took some hot showers without relief. The past few days she has been dry Heaving. Patient reports associated constipation for the past 5 days. Patient reports she has not been taking her Metformin for the past day or 2 due to inability to tolerate it. Today feeling fatigued. Patient reports she was using insulin until she was switched to Metformin recently. Patient reports she occasionally smokes marijuana but has not used for the past week. Lost 15-20 lbs in the last couple of months. Patient denies any dysuria.     Past Medical History:   Diagnosis Date    Cholesterol serum increased 5/19/2010    Diabetes mellitus ([facility] [address] 5/19/2010    HTN (hypertension), benign 5/19/2010    Vitamin D deficiency 11/16/2020       Past Surgical History:   Procedure Laterality Date    HX GYN      s/p Hysterectomy         Family History:   Problem Relation Age of Onset    Diabetes Mother     Cancer Father     Alcohol abuse Neg Hx     Arthritis-rheumatoid Neg Hx     Asthma Neg Hx     Bleeding Prob Neg Hx     Elevated Lipids Neg Hx     Headache Neg Hx     Heart Disease Neg Hx     Hypertension Neg Hx     Lung Disease Neg Hx     Migraines Neg Hx     Psychiatric Disorder Neg Hx     Stroke Neg Hx     Mental Retardation Neg Hx        Social History     Socioeconomic History    Marital status: SINGLE     Spouse name: Not on file    Number of children: Not on file    Years of education: Not on file    Highest education level: Not on file   Occupational History    Not on file   Social Needs    Financial resource strain: Not on file    Food insecurity     Worry: Not on file     Inability: Not on file    Transportation needs     Medical: Not on file     Non-medical: Not on file   Tobacco Use    Smoking status: Current Every Day Smoker     Packs/day: 1.00    Smokeless tobacco: Never Used   Substance and Sexual Activity    Alcohol use: No    Drug use: No    Sexual activity: Yes   Lifestyle    Physical activity     Days per week: Not on file     Minutes per session: Not on file    Stress: Not on file   Relationships    Social connections     Talks on phone: Not on file     Gets together: Not on file     Attends Islam service: Not on file     Active member of club or organization: Not on file     Attends meetings of clubs or organizations: Not on file     Relationship status: Not on file    Intimate partner violence     Fear of current or ex partner: Not on file     Emotionally abused: Not on file     Physically abused: Not on file     Forced sexual activity: Not on file   Other Topics Concern    Not on file   Social History Narrative    Not on file         ALLERGIES: Patient has no known allergies. Review of Systems   Constitutional: Positive for fatigue and unexpected weight change. Gastrointestinal: Positive for abdominal pain, constipation, nausea and vomiting. All other systems reviewed and are negative. Vitals:    05/11/21 0921 05/11/21 0928 05/11/21 1141 05/11/21 1221   BP: (!) 187/99  (!) 180/100 (!) 161/98   Pulse: 90  (!) 104 86   Resp: 20  16 16   Temp:       SpO2: 100% 100% 96% 100%   Weight:       Height:                Physical Exam   Patient Vitals for the past 8 hrs:   Temp Pulse Resp BP SpO2   05/11/21 1221 -- 86 16 (!) 161/98 100 %   05/11/21 1141 -- (!) 104 16 (!) 180/100 96 %   05/11/21 0928 -- -- -- -- 100 %   05/11/21 0921 -- 90 20 (!) 187/99 100 %   05/11/21 0819 98.4 °F (36.9 °C) 80 16 (!) 144/94 100 %        Nursing note and vitals reviewed. Constitutional: NAD. Head: Normocephalic and atraumatic. Mouth/Throat: Airway patent.  Dry mucous membranes. Eyes: EOMI. No scleral icterus. Neck: Neck supple. Cardiovascular: Normal rate, regular rhythm. Normal heart sounds. No murmur, rub, or gallop. Good pulses throughout. Pulmonary/Chest: No respiratory distress. Clear to auscultation bilaterally. No wheezes, rales, or rhonchi. Abdominal/GI: BS normal, Soft, non-tender, non-distended. No rebound or guarding. Musculoskeletal: No gross injuries or deformities. Neurological: Alert and oriented to person, place, and time. Cranial Nerves 2-12 intact. Moving all extremities. No gross deficits. Psych: Pleasant, cooperative. Skin: Skin is warm and dry. No rash noted. Reduced skin turgor. MDM   Ddx = DKA, dehydration, renal injury, electrolyte derangements, gastroparesis, gastritis, cannabinoid hyperemesis constipation, less likely bowel obstruction. Work-up reveals mild hyponatremia, hypokalemia, hypophosphatemia. CT showing stool burden but no SBO. Patient given 1 L IV fluid bolus, Reglan, potassium repletion, phosphorus repletion. On reassessment after treatment patient reports she feels better, able to tolerate p.o. Patient tells me she feels safe enough to go home. I have instructed the patient to start bland diet, slowly work up to a regular diet. Prescribed MiraLAX and Reglan. Referred to GI Dr. Go Hussein. Discharged with return precautions.   Labs Reviewed   METABOLIC PANEL, COMPREHENSIVE - Abnormal; Notable for the following components:       Result Value    Sodium 131 (*)     Potassium 2.8 (*)     Chloride 94 (*)     Glucose 161 (*)     AST (SGOT) 14 (*)     Protein, total 9.2 (*)     Globulin 4.6 (*)     A-G Ratio 1.0 (*)     All other components within normal limits   PHOSPHORUS - Abnormal; Notable for the following components:    Phosphorus 2.2 (*)     All other components within normal limits   CBC WITH AUTOMATED DIFF   TROPONIN I   BNP   MAGNESIUM     CT ABD PELV W CONT   Final Result   Stool (not an appreciably excess amount) and air through colon. No   bowel obstruction. No CT evidence for appendicitis or diverticulitis. No   ascites. Medications   sodium chloride 0.9 % bolus infusion 1,000 mL (1,000 mL IntraVENous New Bag 5/11/21 0922)   metoclopramide HCl (REGLAN) injection 5 mg (5 mg IntraVENous Given 5/11/21 0926)   potassium chloride (KLOR-CON) packet for solution 60 mEq (60 mEq Oral Given 5/11/21 1047)   potassium, sodium phosphates (NEUTRA-PHOS) packet 2 Packet (2 Packets Oral Given 5/11/21 1222)   iopamidoL (ISOVUE-370) 76 % injection 100 mL (100 mL IntraVENous Given 5/11/21 1057)     IBenito MD, am  the first and primary ED provider for this patient.           Procedures
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
## 2021-05-11 NOTE — DISCHARGE INSTRUCTIONS
Start with low sugar Gatorade, can move up to bananas, sugar free oatmeal before spicy/oily foods. Your electrolytes are low, please see PCP within one week to repeat your chemistry blood test.   See Dr. Spencer Dong for evaluation. He is a GI doctor. Return to ED if worsening.

## 2021-10-10 ENCOUNTER — HOSPITAL ENCOUNTER (EMERGENCY)
Age: 41
Discharge: HOME | End: 2021-10-10
Attending: STUDENT IN AN ORGANIZED HEALTH CARE EDUCATION/TRAINING PROGRAM
Payer: MEDICAID

## 2021-10-10 ENCOUNTER — APPOINTMENT (OUTPATIENT)
Dept: GENERAL RADIOLOGY | Age: 41
End: 2021-10-10
Attending: STUDENT IN AN ORGANIZED HEALTH CARE EDUCATION/TRAINING PROGRAM
Payer: MEDICAID

## 2021-10-10 VITALS
OXYGEN SATURATION: 100 % | DIASTOLIC BLOOD PRESSURE: 95 MMHG | TEMPERATURE: 98.2 F | HEIGHT: 67 IN | RESPIRATION RATE: 16 BRPM | WEIGHT: 120 LBS | SYSTOLIC BLOOD PRESSURE: 164 MMHG | BODY MASS INDEX: 18.83 KG/M2 | HEART RATE: 93 BPM

## 2021-10-10 DIAGNOSIS — R03.0 ELEVATED BLOOD PRESSURE READING: ICD-10-CM

## 2021-10-10 DIAGNOSIS — R73.9 HYPERGLYCEMIA: ICD-10-CM

## 2021-10-10 DIAGNOSIS — R10.13 ABDOMINAL PAIN, EPIGASTRIC: Primary | ICD-10-CM

## 2021-10-10 LAB
ABO + RH BLD: NORMAL
ALBUMIN SERPL-MCNC: 4.6 G/DL (ref 3.5–5)
ALBUMIN/GLOB SERPL: 1.2 {RATIO} (ref 1.1–2.2)
ALP SERPL-CCNC: 81 U/L (ref 45–117)
ALT SERPL-CCNC: 23 U/L (ref 12–78)
ANION GAP SERPL CALC-SCNC: 9 MMOL/L (ref 5–15)
AST SERPL W P-5'-P-CCNC: 13 U/L (ref 15–37)
BASOPHILS # BLD: 0 K/UL (ref 0–0.1)
BASOPHILS NFR BLD: 1 % (ref 0–1)
BILIRUB SERPL-MCNC: 0.8 MG/DL (ref 0.2–1)
BLOOD GROUP ANTIBODIES SERPL: NEGATIVE
BUN SERPL-MCNC: 20 MG/DL (ref 6–20)
BUN/CREAT SERPL: 24 (ref 12–20)
CA-I BLD-MCNC: 10.4 MG/DL (ref 8.5–10.1)
CHLORIDE SERPL-SCNC: 98 MMOL/L (ref 97–108)
CO2 SERPL-SCNC: 28 MMOL/L (ref 21–32)
CREAT SERPL-MCNC: 0.82 MG/DL (ref 0.55–1.02)
DIFFERENTIAL METHOD BLD: ABNORMAL
EOSINOPHIL # BLD: 0 K/UL (ref 0–0.4)
EOSINOPHIL NFR BLD: 0 % (ref 0–7)
ERYTHROCYTE [DISTWIDTH] IN BLOOD BY AUTOMATED COUNT: 12.1 % (ref 11.5–14.5)
GLOBULIN SER CALC-MCNC: 4 G/DL (ref 2–4)
GLUCOSE BLD STRIP.AUTO-MCNC: 270 MG/DL (ref 65–117)
GLUCOSE SERPL-MCNC: 297 MG/DL (ref 65–100)
HCT VFR BLD AUTO: 41.6 % (ref 35–47)
HGB BLD-MCNC: 14.5 G/DL (ref 11.5–16)
IMM GRANULOCYTES # BLD AUTO: 0 K/UL (ref 0–0.04)
IMM GRANULOCYTES NFR BLD AUTO: 0 % (ref 0–0.5)
LIPASE SERPL-CCNC: 134 U/L (ref 73–393)
LYMPHOCYTES # BLD: 1.3 K/UL (ref 0.8–3.5)
LYMPHOCYTES NFR BLD: 15 % (ref 12–49)
MCH RBC QN AUTO: 29.9 PG (ref 26–34)
MCHC RBC AUTO-ENTMCNC: 34.9 G/DL (ref 30–36.5)
MCV RBC AUTO: 85.8 FL (ref 80–99)
MONOCYTES # BLD: 0.3 K/UL (ref 0–1)
MONOCYTES NFR BLD: 4 % (ref 5–13)
NEUTS SEG # BLD: 6.9 K/UL (ref 1.8–8)
NEUTS SEG NFR BLD: 80 % (ref 32–75)
NRBC # BLD: 0 K/UL (ref 0–0.01)
NRBC BLD-RTO: 0 PER 100 WBC
PERFORMED BY, TECHID: ABNORMAL
PLATELET # BLD AUTO: 331 K/UL (ref 150–400)
PMV BLD AUTO: 10.4 FL (ref 8.9–12.9)
POTASSIUM SERPL-SCNC: 3.8 MMOL/L (ref 3.5–5.1)
PROT SERPL-MCNC: 8.6 G/DL (ref 6.4–8.2)
RBC # BLD AUTO: 4.85 M/UL (ref 3.8–5.2)
SODIUM SERPL-SCNC: 135 MMOL/L (ref 136–145)
SPECIMEN EXP DATE BLD: NORMAL
TROPONIN-HIGH SENSITIVITY: 3 NG/L (ref 0–51)
WBC # BLD AUTO: 8.5 K/UL (ref 3.6–11)

## 2021-10-10 PROCEDURE — 83690 ASSAY OF LIPASE: CPT

## 2021-10-10 PROCEDURE — 96375 TX/PRO/DX INJ NEW DRUG ADDON: CPT

## 2021-10-10 PROCEDURE — 36415 COLL VENOUS BLD VENIPUNCTURE: CPT

## 2021-10-10 PROCEDURE — 96374 THER/PROPH/DIAG INJ IV PUSH: CPT

## 2021-10-10 PROCEDURE — 71045 X-RAY EXAM CHEST 1 VIEW: CPT

## 2021-10-10 PROCEDURE — 99285 EMERGENCY DEPT VISIT HI MDM: CPT

## 2021-10-10 PROCEDURE — 86901 BLOOD TYPING SEROLOGIC RH(D): CPT

## 2021-10-10 PROCEDURE — 74011250636 HC RX REV CODE- 250/636: Performed by: STUDENT IN AN ORGANIZED HEALTH CARE EDUCATION/TRAINING PROGRAM

## 2021-10-10 PROCEDURE — 80053 COMPREHEN METABOLIC PANEL: CPT

## 2021-10-10 PROCEDURE — 85025 COMPLETE CBC W/AUTO DIFF WBC: CPT

## 2021-10-10 PROCEDURE — 74011000250 HC RX REV CODE- 250: Performed by: STUDENT IN AN ORGANIZED HEALTH CARE EDUCATION/TRAINING PROGRAM

## 2021-10-10 PROCEDURE — 74011250637 HC RX REV CODE- 250/637: Performed by: STUDENT IN AN ORGANIZED HEALTH CARE EDUCATION/TRAINING PROGRAM

## 2021-10-10 PROCEDURE — 93005 ELECTROCARDIOGRAM TRACING: CPT

## 2021-10-10 PROCEDURE — 82962 GLUCOSE BLOOD TEST: CPT

## 2021-10-10 PROCEDURE — 84484 ASSAY OF TROPONIN QUANT: CPT

## 2021-10-10 RX ORDER — ONDANSETRON 2 MG/ML
4 INJECTION INTRAMUSCULAR; INTRAVENOUS
Status: COMPLETED | OUTPATIENT
Start: 2021-10-10 | End: 2021-10-10

## 2021-10-10 RX ORDER — FAMOTIDINE 20 MG/1
20 TABLET, FILM COATED ORAL 2 TIMES DAILY
Qty: 20 TABLET | Refills: 0 | Status: SHIPPED | OUTPATIENT
Start: 2021-10-10 | End: 2021-10-20

## 2021-10-10 RX ORDER — SODIUM CHLORIDE 9 MG/ML
1000 INJECTION, SOLUTION INTRAVENOUS CONTINUOUS
Status: DISCONTINUED | OUTPATIENT
Start: 2021-10-10 | End: 2021-10-11 | Stop reason: HOSPADM

## 2021-10-10 RX ADMIN — SODIUM CHLORIDE 1000 ML: 9 INJECTION, SOLUTION INTRAVENOUS at 22:10

## 2021-10-10 RX ADMIN — ONDANSETRON 4 MG: 2 INJECTION INTRAMUSCULAR; INTRAVENOUS at 22:04

## 2021-10-10 RX ADMIN — FAMOTIDINE 20 MG: 10 INJECTION, SOLUTION INTRAVENOUS at 22:06

## 2021-10-10 RX ADMIN — LIDOCAINE HYDROCHLORIDE 30 ML: 20 SOLUTION ORAL; TOPICAL at 22:00

## 2021-10-10 NOTE — ED TRIAGE NOTES
Pt to ed via ems for chest pain with n/v. Reports coffee ground emesis. Zofran 4mg PO given en route.  20g IV R AC upon arrival.

## 2021-10-11 LAB
ATRIAL RATE: 94 BPM
CALCULATED P AXIS, ECG09: 75 DEGREES
CALCULATED R AXIS, ECG10: 85 DEGREES
CALCULATED T AXIS, ECG11: 52 DEGREES
DIAGNOSIS, 93000: NORMAL
P-R INTERVAL, ECG05: 130 MS
Q-T INTERVAL, ECG07: 346 MS
QRS DURATION, ECG06: 84 MS
QTC CALCULATION (BEZET), ECG08: 432 MS
VENTRICULAR RATE, ECG03: 94 BPM

## 2021-10-11 NOTE — ED NOTES
Bedside shift change report given to [facility], RN (oncoming nurse) by Rebeca Segura RN (offgoing nurse). Report included the following information SBAR, Kardex, ED Summary, [facility] and Recent Results.

## 2021-10-11 NOTE — ED PROVIDER NOTES
EMERGENCY DEPARTMENT HISTORY AND PHYSICAL EXAM      Date: 10/10/2021  Patient Name: Boom Pena      History of Presenting Illness     Chief Complaint   Patient presents with    Vomiting    Chest Pain       History Provided By: Patient    HPI: Boom Pena, 39 y.o. female with PMH of DM, HTN, and HLD who presents for evaluation of epigastric pain. Epigastric pain and is dull and burning and radiates up her chest associated with vomiting. It was mentioned in triage note that the patient vomited coffee-ground emesis. However, patient report that she vomited food contents. Due to the epigastric pain, patient reports sometimes that she is feeling short of breath due to pain. But does not have shortness of breath at baseline and does not have exertional shortness of breath. There is no lower extremity swelling or pain. There are no other complaints, changes, or physical findings at this time. PCP: Cecilia Valadez MD    Current Facility-Administered Medications   Medication Dose Route Frequency Provider Last Rate Last Admin    0.9% sodium chloride infusion 1,000 mL  1,000 mL IntraVENous CONTINUOUS Marielle Delgado MD   1,000 mL at 10/10/21 2210     Current Outpatient Medications   Medication Sig Dispense Refill    famotidine (Pepcid) 20 mg tablet Take 1 Tablet by mouth two (2) times a day for 10 days. 20 Tablet 0    lisinopril-hydroCHLOROthiazide (PRINZIDE, ZESTORETIC) 20-25 mg per tablet Take 1 Tablet by mouth daily for 90 days. 90 Tablet 2    polyethylene glycol (Miralax) 17 gram/dose powder Take 17 g by mouth daily.  1 tablespoon with 8 oz of water daily 235 g 0    Blood-Glucose Meter (True Metrix Glucose Meter) misc Check glucose once a day 1 Each 0    glucose blood VI test strips (True Metrix Glucose Test Strip) strip Check glucose once a day 50 Strip 5    lancets misc Check glucose once a day 50 Each 5    metFORMIN (GLUCOPHAGE) 1,000 mg tablet Take 1 Tab by mouth two (2) times daily (with meals). 60 Tab 3    metoprolol succinate (TOPROL-XL) 25 mg XL tablet Take 2 Tabs by mouth daily. Indications: paroxysmal supraventricular tachycardia 30 Tab 1    Blood-Glucose Meter monitoring kit Check once a day 1 Kit 0    glucose blood VI test strips (OneTouch Verio test strips) strip Check once a day 50 Strip 5    lancets misc Check once a day 50 Each 5    ondansetron (Zofran ODT) 4 mg disintegrating tablet Take 1 Tab by mouth every eight (8) hours as needed for Nausea or Vomiting. 10 Tab 1    butalbital-acetaminophen-caff (Fioricet) -40 mg per capsule Take 1 Cap by mouth every four (4) hours as needed for Headache for up to 10 doses. 10 Cap 0       Past History     Past Medical History:  Past Medical History:   Diagnosis Date    Cholesterol serum increased 5/19/2010    Diabetes mellitus ([facility] [address] 5/19/2010    HTN (hypertension), benign 5/19/2010    Vitamin D deficiency 11/16/2020       Past Surgical History:  Past Surgical History:   Procedure Laterality Date    HX GYN      s/p Hysterectomy       Family History:  Family History   Problem Relation Age of Onset    Diabetes Mother     Cancer Father     Alcohol abuse Neg Hx     Arthritis-rheumatoid Neg Hx     Asthma Neg Hx     Bleeding Prob Neg Hx     Elevated Lipids Neg Hx     Headache Neg Hx     Heart Disease Neg Hx     Hypertension Neg Hx     Lung Disease Neg Hx     Migraines Neg Hx     Psychiatric Disorder Neg Hx     Stroke Neg Hx     Mental Retardation Neg Hx        Social History:  Social History     Tobacco Use    Smoking status: Current Every Day Smoker     Packs/day: 1.00    Smokeless tobacco: Never Used   Vaping Use    Vaping Use: Never used   Substance Use Topics    Alcohol use: No    Drug use: No       Allergies:  No Known Allergies      Review of Systems     Review of Systems   Constitutional: Negative for appetite change and fever. HENT: Negative for rhinorrhea and trouble swallowing.     Eyes: Negative for photophobia and visual disturbance. Respiratory: Negative for cough and shortness of breath. Cardiovascular: Negative for chest pain and leg swelling. Gastrointestinal: Positive for abdominal pain, nausea and vomiting. Negative for constipation and diarrhea. Genitourinary: Negative for dysuria and flank pain. Musculoskeletal: Negative for back pain and gait problem. Skin: Negative for color change and rash. Neurological: Negative for dizziness and headaches. Physical Exam     Physical Exam  Constitutional:       Appearance: Normal appearance. HENT:      Head: Normocephalic and atraumatic. Mouth/Throat:      Mouth: Mucous membranes are moist.      Pharynx: Oropharynx is clear. Eyes:      Extraocular Movements: Extraocular movements intact. Conjunctiva/sclera: Conjunctivae normal.   Cardiovascular:      Rate and Rhythm: Normal rate and regular rhythm. Pulmonary:      Effort: Pulmonary effort is normal.      Breath sounds: Normal breath sounds. Abdominal:      General: Abdomen is flat. Palpations: Abdomen is soft. Tenderness: There is abdominal tenderness in the epigastric area. Musculoskeletal:         General: No tenderness or deformity. Cervical back: Normal range of motion and neck supple. Skin:     General: Skin is warm and dry. Neurological:      General: No focal deficit present. Mental Status: She is alert and oriented to person, place, and time.          Lab and Diagnostic Study Results     Labs -     Recent Results (from the past 12 hour(s))   GLUCOSE, POC    Collection Time: 10/10/21  8:04 PM   Result Value Ref Range    Glucose (POC) 270 (H) 65 - 117 mg/dL    Performed by Elaine [place]    TROPONIN-HIGH SENSITIVITY    Collection Time: 10/10/21  8:49 PM   Result Value Ref Range    Troponin-High Sensitivity 3 0 - 51 ng/L   CBC WITH AUTOMATED DIFF    Collection Time: 10/10/21  8:49 PM   Result Value Ref Range    WBC 8.5 3.6 - 11.0 K/uL    RBC 4.85 3.80 - 5.20 M/uL    HGB 14.5 11.5 - 16.0 g/dL    HCT 41.6 35.0 - 47.0 %    MCV 85.8 80.0 - 99.0 FL    MCH 29.9 26.0 - 34.0 PG    MCHC 34.9 30.0 - 36.5 g/dL    RDW 12.1 11.5 - 14.5 %    PLATELET 300 894 - 069 K/uL    MPV 10.4 8.9 - 12.9 FL    NRBC 0.0 0.0  WBC    ABSOLUTE NRBC 0.00 0.00 - 0.01 K/uL    NEUTROPHILS 80 (H) 32 - 75 %    LYMPHOCYTES 15 12 - 49 %    MONOCYTES 4 (L) 5 - 13 %    EOSINOPHILS 0 0 - 7 %    BASOPHILS 1 0 - 1 %    IMMATURE GRANULOCYTES 0 0 - 0.5 %    ABS. NEUTROPHILS 6.9 1.8 - 8.0 K/UL    ABS. LYMPHOCYTES 1.3 0.8 - 3.5 K/UL    ABS. MONOCYTES 0.3 0.0 - 1.0 K/UL    ABS. EOSINOPHILS 0.0 0.0 - 0.4 K/UL    ABS. BASOPHILS 0.0 0.0 - 0.1 K/UL    ABS. IMM. GRANS. 0.0 0.00 - 0.04 K/UL    DF AUTOMATED     METABOLIC PANEL, COMPREHENSIVE    Collection Time: 10/10/21  8:49 PM   Result Value Ref Range    Sodium 135 (L) 136 - 145 mmol/L    Potassium 3.8 3.5 - 5.1 mmol/L    Chloride 98 97 - 108 mmol/L    CO2 28 21 - 32 mmol/L    Anion gap 9 5 - 15 mmol/L    Glucose 297 (H) 65 - 100 mg/dL    BUN 20 6 - 20 mg/dL    Creatinine 0.82 0.55 - 1.02 mg/dL    BUN/Creatinine ratio 24 (H) 12 - 20      GFR est AA >60 >60 ml/min/1.73m2    GFR est non-AA >60 >60 ml/min/1.73m2    Calcium 10.4 (H) 8.5 - 10.1 mg/dL    Bilirubin, total 0.8 0.2 - 1.0 mg/dL    AST (SGOT) 13 (L) 15 - 37 U/L    ALT (SGPT) 23 12 - 78 U/L    Alk.  phosphatase 81 45 - 117 U/L    Protein, total 8.6 (H) 6.4 - 8.2 g/dL    Albumin 4.6 3.5 - 5.0 g/dL    Globulin 4.0 2.0 - 4.0 g/dL    A-G Ratio 1.2 1.1 - 2.2     TYPE & SCREEN    Collection Time: 10/10/21  8:49 PM   Result Value Ref Range    Crossmatch Expiration 10/13/2021,2359     ABO/Rh(D) B Positive     Antibody screen Negative    LIPASE    Collection Time: 10/10/21  8:49 PM   Result Value Ref Range    Lipase 134 73 - 393 U/L       Radiologic Studies -   [unfilled]  CT Results  (Last 48 hours)    None        CXR Results  (Last 48 hours)               10/10/21 2025  XR CHEST PORT Final result Impression:  Negative portable exam of the chest.       Narrative:  Portable radiograph of the chest, one view       INDICATION: Chest pain       COMPARISON: 5/9/2021       FINDINGS:   Lungs are clear. Cardiomediastinal contour is within normal limits. There is no   pneumothorax or pleural effusion. No acute osseous abnormality. Medical Decision Making and ED Course   - I am the first and primary provider for this patient AND AM THE PRIMARY PROVIDER OF RECORD. - I reviewed the vital signs, available nursing notes, past medical history, past surgical history, family history and social history. - Initial assessment performed. The patients presenting problems have been discussed, and the staff are in agreement with the care plan formulated and outlined with them. I have encouraged them to ask questions as they arise throughout their visit. Vital Signs-Reviewed the patient's vital signs. Patient Vitals for the past 12 hrs:   Temp Pulse Resp BP SpO2   10/10/21 2031 -- -- -- -- 100 %   10/10/21 1955 98.2 °F (36.8 °C) 93 16 (!) 164/95 100 %         Records Reviewed: Nursing Notes    Provider Notes (Medical Decision Making):   63-year-old female presenting with epigastric pain radiating up her chest with sensation of burning. Symptoms are more consistent with GERD/gastritis. However, cannot rule out pancreatitis based on exam.  Symptoms does not appear to be secondary to an intrathoracic pathology. Patient symptoms are not exertional in nature and is not pleuritic. Thus, CBC, chemistry, lipase, and high-sensitivity troponins were obtained to risk stratify the patient and work-up was unremarkable. Patient did receive GI cocktail with NT acid with improvement of her symptoms. She was able tolerate p.o. Thus, I discussed with the patient that she would benefit from follow-up with her primary doctor for evaluation and possibly referral to gastroenterology and she verbalized understanding.   I did send a prescription of vomiting to her pharmacy. Stressed on the importance of coming back to the ED if symptoms worsening, change in pattern or she has any new concerning symptoms. ED Course:       ED Course as of Oct 11 0033   Edilma Fay Oct 10, 2021   2020 EKG was obtained at 8:17 PM and interpreted by me as sinus rhythm rate of 94 with normal WI, QRS, and QT intervals. T wave is within normal without significant abnormality, there is no significant ST deviation on this EKG. No signs of dysrhythmia or blocks. Axis is within normal.    [AA]      ED Course User Index  [AA] Marielle Delgado MD           Disposition     Disposition: Condition improved  DC- Adult Discharges: All of the diagnostic tests were reviewed and questions answered. Diagnosis, care plan and treatment options were discussed. The patient understands the instructions and will follow up as directed. The patients results have been reviewed with them. They have been counseled regarding their diagnosis. The patient verbally convey understanding and agreement of the signs, symptoms, diagnosis, treatment and prognosis and additionally agrees to follow up as recommended with their PCP in 24 - 48 hours. They also agree with the care-plan and convey that all of their questions have been answered. I have also put together some discharge instructions for them that include: 1) educational information regarding their diagnosis, 2) how to care for their diagnosis at home, as well a 3) list of reasons why they would want to return to the ED prior to their follow-up appointment, should their condition change. Discharged      DISCHARGE PLAN:  1. Current Discharge Medication List      CONTINUE these medications which have NOT CHANGED    Details   lisinopril-hydroCHLOROthiazide (PRINZIDE, ZESTORETIC) 20-25 mg per tablet Take 1 Tablet by mouth daily for 90 days.   Qty: 90 Tablet, Refills: 2    Associated Diagnoses: Benign essential HTN polyethylene glycol (Miralax) 17 gram/dose powder Take 17 g by mouth daily. 1 tablespoon with 8 oz of water daily  Qty: 235 g, Refills: 0      Blood-Glucose Meter (True Metrix Glucose Meter) misc Check glucose once a day  Qty: 1 Each, Refills: 0    Associated Diagnoses: Type 2 diabetes mellitus with hyperglycemia, without long-term current use of insulin ([address]      ! ! glucose blood VI test strips (True Metrix Glucose Test Strip) strip Check glucose once a day  Qty: 50 Strip, Refills: 5    Associated Diagnoses: Type 2 diabetes mellitus with hyperglycemia, without long-term current use of insulin ([address]      ! ! lancets misc Check glucose once a day  Qty: 50 Each, Refills: 5    Associated Diagnoses: Type 2 diabetes mellitus with hyperglycemia, without long-term current use of insulin ([facility])      metFORMIN (GLUCOPHAGE) 1,000 mg tablet Take 1 Tab by mouth two (2) times daily (with meals). Qty: 60 Tab, Refills: 3    Associated Diagnoses: Type 2 diabetes mellitus with hyperglycemia, without long-term current use of insulin ([facility])      metoprolol succinate (TOPROL-XL) 25 mg XL tablet Take 2 Tabs by mouth daily. Indications: paroxysmal supraventricular tachycardia  Qty: 30 Tab, Refills: 1    Associated Diagnoses: PSVT (paroxysmal supraventricular tachycardia) ([facility])      Blood-Glucose Meter monitoring kit Check once a day  Qty: 1 Kit, Refills: 0    Associated Diagnoses: Type 2 diabetes mellitus with hyperglycemia, without long-term current use of insulin ([address]      ! ! glucose blood VI test strips (OneTouch Verio test strips) strip Check once a day  Qty: 50 Strip, Refills: 5    Associated Diagnoses: Type 2 diabetes mellitus with hyperglycemia, without long-term current use of insulin ([address]      ! ! lancets misc Check once a day  Qty: 50 Each, Refills: 5    Associated Diagnoses: Type 2 diabetes mellitus with hyperglycemia, without long-term current use of insulin ([facility])      ondansetron (Zofran ODT) 4 mg disintegrating tablet Take 1 Tab by mouth every eight (8) hours as needed for Nausea or Vomiting. Qty: 10 Tab, Refills: 1      butalbital-acetaminophen-caff (Fioricet) -40 mg per capsule Take 1 Cap by mouth every four (4) hours as needed for Headache for up to 10 doses. Qty: 10 Cap, Refills: 0       !! - Potential duplicate medications found. Please discuss with provider. 2.   Follow-up Information     Follow up With Specialties Details Why [facility] EMERGENCY DEPT Emergency Medicine Go to  As needed, If symptoms worsen [address] [facility] [address] [phone]    Primary care doctor   For reevaluation, Discuss your visit to the ER, Follow-up on elevated blood pressure reading         3. Return to ED if worse   4. Discharge Medication List as of 10/10/2021 11:43 PM      START taking these medications    Details   famotidine (Pepcid) 20 mg tablet Take 1 Tablet by mouth two (2) times a day for 10 days. , Normal, Disp-20 Tablet, R-0         CONTINUE these medications which have NOT CHANGED    Details   lisinopril-hydroCHLOROthiazide (PRINZIDE, ZESTORETIC) 20-25 mg per tablet Take 1 Tablet by mouth daily for 90 days. , Normal, Disp-90 Tablet, R-2      polyethylene glycol (Miralax) 17 gram/dose powder Take 17 g by mouth daily. 1 tablespoon with 8 oz of water daily, Normal, Disp-235 g, R-0      Blood-Glucose Meter (True Metrix Glucose Meter) misc Check glucose once a day, Normal, Disp-1 Each, R-0      !! glucose blood VI test strips (True Metrix Glucose Test Strip) strip Check glucose once a day, Normal, Disp-50 Strip, R-5      !! lancets misc Check glucose once a day, Normal, Disp-50 Each, R-5      metFORMIN (GLUCOPHAGE) 1,000 mg tablet Take 1 Tab by mouth two (2) times daily (with meals). , Normal, Disp-60 Tab, R-3      metoprolol succinate (TOPROL-XL) 25 mg XL tablet Take 2 Tabs by mouth daily.  Indications: paroxysmal supraventricular tachycardia, Normal, Disp-30 Tab, R-1      Blood-Glucose Meter monitoring kit Check once a day, Normal, Disp-1 Kit, R-0      !! glucose blood VI test strips (OneTouch Verio test strips) strip Check once a day, Normal, Disp-50 Strip, R-5      !! lancets misc Check once a day, Normal, Disp-50 Each, R-5      ondansetron (Zofran ODT) 4 mg disintegrating tablet Take 1 Tab by mouth every eight (8) hours as needed for Nausea or Vomiting., Normal, Disp-10 Tab, R-1      butalbital-acetaminophen-caff (Fioricet) -40 mg per capsule Take 1 Cap by mouth every four (4) hours as needed for Headache for up to 10 doses. , Normal, Disp-10 Cap, R-0       !! - Potential duplicate medications found. Please discuss with provider. Diagnosis     Clinical Impression:   1. Abdominal pain, epigastric    2. Hyperglycemia    3. Elevated blood pressure reading        Attestations: Drucilla Mcardle, MD    Please note that this dictation was completed with [employer], the [employer] voice recognition software. Quite often unanticipated grammatical, syntax, homophones, and other interpretive errors are inadvertently transcribed by the computer software. Please disregard these errors. Please excuse any errors that have escaped final proofreading. Thank you.

## 2021-10-11 NOTE — DISCHARGE INSTRUCTIONS
Thank you! Thank you for allowing me to care for you in the emergency department. I sincerely hope that you are satisfied with your visit today. It is my goal to provide you with excellent care. Below you will find a list of your labs and imaging from your visit today. Should you have any questions regarding these results please do not hesitate to call the emergency department. Labs -     Recent Results (from the past 12 hour(s))   GLUCOSE, POC    Collection Time: 10/10/21  8:04 PM   Result Value Ref Range    Glucose (POC) 270 (H) 65 - 117 mg/dL    Performed by Edenilson [address] SENSITIVITY    Collection Time: 10/10/21  8:49 PM   Result Value Ref Range    Troponin-High Sensitivity 3 0 - 51 ng/L   CBC WITH AUTOMATED DIFF    Collection Time: 10/10/21  8:49 PM   Result Value Ref Range    WBC 8.5 3.6 - 11.0 K/uL    RBC 4.85 3.80 - 5.20 M/uL    HGB 14.5 11.5 - 16.0 g/dL    HCT 41.6 35.0 - 47.0 %    MCV 85.8 80.0 - 99.0 FL    MCH 29.9 26.0 - 34.0 PG    MCHC 34.9 30.0 - 36.5 g/dL    RDW 12.1 11.5 - 14.5 %    PLATELET 877 450 - 417 K/uL    MPV 10.4 8.9 - 12.9 FL    NRBC 0.0 0.0  WBC    ABSOLUTE NRBC 0.00 0.00 - 0.01 K/uL    NEUTROPHILS 80 (H) 32 - 75 %    LYMPHOCYTES 15 12 - 49 %    MONOCYTES 4 (L) 5 - 13 %    EOSINOPHILS 0 0 - 7 %    BASOPHILS 1 0 - 1 %    IMMATURE GRANULOCYTES 0 0 - 0.5 %    ABS. NEUTROPHILS 6.9 1.8 - 8.0 K/UL    ABS. LYMPHOCYTES 1.3 0.8 - 3.5 K/UL    ABS. MONOCYTES 0.3 0.0 - 1.0 K/UL    ABS. EOSINOPHILS 0.0 0.0 - 0.4 K/UL    ABS. BASOPHILS 0.0 0.0 - 0.1 K/UL    ABS. IMM.  GRANS. 0.0 0.00 - 0.04 K/UL    DF AUTOMATED     METABOLIC PANEL, COMPREHENSIVE    Collection Time: 10/10/21  8:49 PM   Result Value Ref Range    Sodium 135 (L) 136 - 145 mmol/L    Potassium 3.8 3.5 - 5.1 mmol/L    Chloride 98 97 - 108 mmol/L    CO2 28 21 - 32 mmol/L    Anion gap 9 5 - 15 mmol/L    Glucose 297 (H) 65 - 100 mg/dL    BUN 20 6 - 20 mg/dL    Creatinine 0.82 0.55 - 1.02 mg/dL    BUN/Creatinine ratio 24 (H) 12 - 20      GFR est AA >60 >60 ml/min/1.73m2    GFR est non-AA >60 >60 ml/min/1.73m2    Calcium 10.4 (H) 8.5 - 10.1 mg/dL    Bilirubin, total 0.8 0.2 - 1.0 mg/dL    AST (SGOT) 13 (L) 15 - 37 U/L    ALT (SGPT) 23 12 - 78 U/L    Alk. phosphatase 81 45 - 117 U/L    Protein, total 8.6 (H) 6.4 - 8.2 g/dL    Albumin 4.6 3.5 - 5.0 g/dL    Globulin 4.0 2.0 - 4.0 g/dL    A-G Ratio 1.2 1.1 - 2.2     TYPE & SCREEN    Collection Time: 10/10/21  8:49 PM   Result Value Ref Range    Crossmatch Expiration 10/13/2021,2359     ABO/Rh(D) B Positive     Antibody screen Negative    LIPASE    Collection Time: 10/10/21  8:49 PM   Result Value Ref Range    Lipase 134 73 - 393 U/L       Radiologic Studies -   XR CHEST PORT   Final Result   Negative portable exam of the chest.        CT Results  (Last 48 hours)      None          CXR Results  (Last 48 hours)                 10/10/21 2025  XR CHEST PORT Final result    Impression:  Negative portable exam of the chest.       Narrative:  Portable radiograph of the chest, one view       INDICATION: Chest pain       COMPARISON: 5/9/2021       FINDINGS:   Lungs are clear. Cardiomediastinal contour is within normal limits. There is no   pneumothorax or pleural effusion. No acute osseous abnormality. If you feel that you have not received excellent quality care or timely care, please ask to speak to the nurse manager. Please choose us in the future for your continued health care needs. ------------------------------------------------------------------------------------------------------------  The exam and treatment you received in the Emergency Department were for an urgent problem and are not intended as complete care. It is important that you follow-up with a doctor, nurse practitioner, or physician assistant to:  (1) confirm your diagnosis,  (2) re-evaluation of changes in your illness and treatment, and  (3) for ongoing care.   If your symptoms become worse or you do not improve as expected and you are unable to reach your usual health care provider, you should return to the Emergency Department. We are available 24 hours a day. Please take your discharge instructions with you when you go to your follow-up appointment. If you have any problem arranging a follow-up appointment, contact the Emergency Department immediately. If a prescription has been provided, please have it filled as soon as possible to prevent a delay in treatment. Read the entire medication instruction sheet provided to you by the pharmacy. If you have any questions or reservations about taking the medication due to side effects or interactions with other medications, please call your primary care physician or contact the ER to speak with the charge nurse. Make an appointment with your family doctor or the physician you were referred to for follow-up of this visit as instructed on your discharge paperwork, as this is a mandatory follow-up. Return to the ER if you are unable to be seen or if you are unable to be seen in a timely manner. If you have any problem arranging the follow-up visit, contact the Emergency Department immediately.

## 2021-10-26 ENCOUNTER — TELEPHONE (OUTPATIENT)
Dept: ENDOCRINOLOGY | Age: 41
End: 2021-10-26

## 2021-10-26 DIAGNOSIS — E11.65 TYPE 2 DIABETES MELLITUS WITH HYPERGLYCEMIA, WITHOUT LONG-TERM CURRENT USE OF INSULIN (HCC): ICD-10-CM

## 2021-10-26 DIAGNOSIS — I47.1 PSVT (PAROXYSMAL SUPRAVENTRICULAR TACHYCARDIA) (HCC): ICD-10-CM

## 2021-10-26 DIAGNOSIS — I10 BENIGN ESSENTIAL HTN: ICD-10-CM

## 2021-10-26 RX ORDER — METFORMIN HYDROCHLORIDE 1000 MG/1
1000 TABLET ORAL 2 TIMES DAILY WITH MEALS
Qty: 180 TABLET | Refills: 1 | Status: SHIPPED | OUTPATIENT
Start: 2021-10-26 | End: 2021-11-10 | Stop reason: SDUPTHER

## 2021-10-26 RX ORDER — LISINOPRIL AND HYDROCHLOROTHIAZIDE 20; 25 MG/1; MG/1
1 TABLET ORAL DAILY
Qty: 90 TABLET | Refills: 2 | Status: SHIPPED | OUTPATIENT
Start: 2021-10-26 | End: 2021-11-10 | Stop reason: ALTCHOICE

## 2021-10-26 RX ORDER — METOPROLOL SUCCINATE 25 MG/1
50 TABLET, EXTENDED RELEASE ORAL DAILY
Qty: 180 TABLET | Refills: 1 | Status: SHIPPED | OUTPATIENT
Start: 2021-10-26 | End: 2022-01-24

## 2021-10-26 NOTE — TELEPHONE ENCOUNTER
Patient came into the office as she has moved back to [place] and states that she needs a refill on metoprolol, lisinopril, & metformin. .she is scheduled for an appt 11/10/21 at 3:15pm. She is completely out of her medications metformin and metoprolol.

## 2021-11-10 ENCOUNTER — OFFICE VISIT (OUTPATIENT)
Dept: ENDOCRINOLOGY | Age: 41
End: 2021-11-10
Payer: MEDICAID

## 2021-11-10 VITALS
WEIGHT: 117.3 LBS | TEMPERATURE: 98.4 F | HEART RATE: 77 BPM | HEIGHT: 67 IN | DIASTOLIC BLOOD PRESSURE: 92 MMHG | BODY MASS INDEX: 18.41 KG/M2 | SYSTOLIC BLOOD PRESSURE: 141 MMHG | OXYGEN SATURATION: 98 %

## 2021-11-10 DIAGNOSIS — Z79.4 TYPE 2 DIABETES MELLITUS WITH HYPERGLYCEMIA, WITH LONG-TERM CURRENT USE OF INSULIN (HCC): ICD-10-CM

## 2021-11-10 DIAGNOSIS — I10 BENIGN ESSENTIAL HTN: ICD-10-CM

## 2021-11-10 DIAGNOSIS — E11.65 TYPE 2 DIABETES MELLITUS WITH HYPERGLYCEMIA, WITHOUT LONG-TERM CURRENT USE OF INSULIN (HCC): Primary | ICD-10-CM

## 2021-11-10 DIAGNOSIS — E11.65 TYPE 2 DIABETES MELLITUS WITH HYPERGLYCEMIA, WITH LONG-TERM CURRENT USE OF INSULIN (HCC): ICD-10-CM

## 2021-11-10 LAB
BILIRUB UR QL STRIP: NEGATIVE
GLUCOSE POC: 315 MG/DL
GLUCOSE UR-MCNC: NORMAL MG/DL
HBA1C MFR BLD HPLC: 9.7 %
KETONES P FAST UR STRIP-MCNC: NEGATIVE MG/DL
PH UR STRIP: 7 [PH] (ref 4.6–8)
PROT UR QL STRIP: NEGATIVE
SP GR UR STRIP: 1.01 (ref 1–1.03)
UA UROBILINOGEN AMB POC: NORMAL (ref 0.2–1)
URINALYSIS CLARITY POC: NORMAL
URINALYSIS COLOR POC: NORMAL
URINE BLOOD POC: NEGATIVE
URINE LEUKOCYTES POC: NEGATIVE
URINE NITRITES POC: NEGATIVE

## 2021-11-10 PROCEDURE — 82962 GLUCOSE BLOOD TEST: CPT | Performed by: INTERNAL MEDICINE

## 2021-11-10 PROCEDURE — 99214 OFFICE O/P EST MOD 30 MIN: CPT | Performed by: INTERNAL MEDICINE

## 2021-11-10 PROCEDURE — 83036 HEMOGLOBIN GLYCOSYLATED A1C: CPT | Performed by: INTERNAL MEDICINE

## 2021-11-10 PROCEDURE — 81003 URINALYSIS AUTO W/O SCOPE: CPT | Performed by: INTERNAL MEDICINE

## 2021-11-10 RX ORDER — LANCETS 33 GAUGE
EACH MISCELLANEOUS
Qty: 100 EACH | Refills: 5 | Status: SHIPPED | OUTPATIENT
Start: 2021-11-10

## 2021-11-10 RX ORDER — PEN NEEDLE, DIABETIC 30 GX3/16"
NEEDLE, DISPOSABLE MISCELLANEOUS
Qty: 1 EACH | Refills: 3 | Status: SHIPPED | OUTPATIENT
Start: 2021-11-10

## 2021-11-10 RX ORDER — LISINOPRIL 10 MG/1
10 TABLET ORAL DAILY
Qty: 30 TABLET | Refills: 3 | Status: SHIPPED | OUTPATIENT
Start: 2021-11-10 | End: 2021-12-10

## 2021-11-10 RX ORDER — INSULIN PUMP SYRINGE, 3 ML
EACH MISCELLANEOUS
Qty: 1 KIT | Refills: 0 | Status: SHIPPED | OUTPATIENT
Start: 2021-11-10

## 2021-11-10 RX ORDER — METFORMIN HYDROCHLORIDE 1000 MG/1
1000 TABLET ORAL 2 TIMES DAILY WITH MEALS
Qty: 180 TABLET | Refills: 1 | Status: SHIPPED | OUTPATIENT
Start: 2021-11-10 | End: 2022-02-08

## 2021-11-10 RX ORDER — BLOOD SUGAR DIAGNOSTIC
STRIP MISCELLANEOUS
Qty: 100 STRIP | Refills: 5 | Status: SHIPPED | OUTPATIENT
Start: 2021-11-10

## 2021-11-10 RX ORDER — INSULIN GLARGINE 100 [IU]/ML
INJECTION, SOLUTION SUBCUTANEOUS
Qty: 1 ADJUSTABLE DOSE PRE-FILLED PEN SYRINGE | Refills: 3 | Status: SHIPPED | OUTPATIENT
Start: 2021-11-10

## 2021-11-10 NOTE — PROGRESS NOTES
History and Physical    Patient: Anette Gupta MRN: [id number]  SSN: [id number]    YOB: 1980  Age: 39 y.o. Sex: female      Subjective:      Anette Gupta is a 39 y.o. female with past medical history of hypertension, paroxysmal supraventricular tachycardia, polysubstance abuse, bipolar disorder is here for follow-up of type 2 diabetes mellitus. She is sent to me by podiatrist Dr. Haseeb Mckeon. Currently does not have a primary care physician. She was diagnosed with diabetes when she was 16years old. She was around 180 pounds at that time. She was on Lantus and Metformin but she was on much higher dose of Lantus, at 50 units daily. She was hospitalized last year at [facility], at which time Lantus was decreased to only 10 units daily as patient had lost a lot of weight. This patient only once, in March 2021. At that time I did not restart Lantus, but she was started on Metformin 1000 mg twice a day. However, patient just did not start taking it until 2 to 3 weeks back. She picked up her glucometer but lost it. Needs a new glucometer again. She is working 2 jobs, at [id number]. She is currently living in a hotel room, eating mostly frozen, canned foods. Does not drink soda or sweet tea but she has been drinking fruit juice. She has a lot of constipation. Not exercising. She has depression, history of bipolar disorder previously on quetiapine, Seroquel but currently she is not taking anything. She tries to cope by herself but she feels herself failing. She needs to find a new primary care doctor or therapist.    She has access to refrigerator and microwave at work and home but she is not utilizing her resources and she acknowledges that. She has lost 13 pounds since the last visit, unintentionally. She was referred for diabetic eye exam at the last visit but she just did not make appointment.   She does not have a clear explanation for why she does not take care of herself but she wants to try to get better. S/p partial hysterectomy 2014. Glucometer reading: Not checking blood glucose at home    Updated diabetes history:  · Diagnosis: 16years old  · Current treatment: Lantus 10 units daily  · Past treatment: metformin (ran out)  · Glucose checks: needs new meter  · Hyperglycemia: yes  · Hypoglycemia: no  · Meals per day: 3,  · Exercise: no  · DM related hospitalizations: yes for hyperglycemia, DKA 9919    Complications of DM:  · CAD: no  · CVA: no  · PVD: no  · Amputations: no   · Retinopathy: no; last exam was 2017  · Gastropathy: no  · Nephropathy: no  · Neuropathy: yes    Medications:  · Statin: no  · ACE-I: lisinopril  · ASA: no    · Diabetes education: no    Past Medical History:   Diagnosis Date    Cholesterol serum increased 5/19/2010    Diabetes mellitus ([facility] [address] 5/19/2010    HTN (hypertension), benign 5/19/2010    Vitamin D deficiency 11/16/2020     Past Surgical History:   Procedure Laterality Date    HX GYN      s/p Hysterectomy      Family History   Problem Relation Age of Onset    Diabetes Mother     Cancer Father     Alcohol abuse Neg Hx     Arthritis-rheumatoid Neg Hx     Asthma Neg Hx     Bleeding Prob Neg Hx     Elevated Lipids Neg Hx     Headache Neg Hx     Heart Disease Neg Hx     Hypertension Neg Hx     Lung Disease Neg Hx     Migraines Neg Hx     Psychiatric Disorder Neg Hx     Stroke Neg Hx     Mental Retardation Neg Hx      Social History     Tobacco Use    Smoking status: Current Every Day Smoker     Packs/day: 1.00    Smokeless tobacco: Never Used   Substance Use Topics    Alcohol use: No      Prior to Admission medications    Medication Sig Start Date End Date Taking?  Authorizing Provider   insulin glargine (LANTUS,BASAGLAR) 100 unit/mL (3 mL) inpn Inject 10 units at bedtime 11/10/21  Yes Jack Alatorre MD   metFORMIN (GLUCOPHAGE) 1,000 mg tablet Take 1 Tablet by mouth two (2) times daily (with meals) for 90 days. 11/10/21 2/8/22 Yes Ericka Dupree MD   Insulin Needles, Disposable, 31 gauge x 5/16\" ndle Once a day 11/10/21  Yes Ericka Dupree MD   glucose blood VI test strips (OneTouch Verio test strips) strip Check glucose twice a day 11/10/21  Yes Ericka Dupree MD   Blood-Glucose Meter (OneTouch Verio IQ Meter) monitoring kit Check glucose twice a day 11/10/21  Yes Ericka Dupree MD   lancets (One Touch Delica) 33 gauge misc Check glucose twice a day 11/10/21  Yes Ericka Dupree MD   lisinopriL (PRINIVIL, ZESTRIL) 10 mg tablet Take 1 Tablet by mouth daily for 30 days. 11/10/21 12/10/21 Yes Ericka Dupree MD   metoprolol succinate (TOPROL-XL) 25 mg XL tablet Take 2 Tablets by mouth daily for 90 days. Indications: paroxysmal supraventricular tachycardia 10/26/21 1/24/22 Yes Ericka Dupree MD   polyethylene glycol (Miralax) 17 gram/dose powder Take 17 g by mouth daily. 1 tablespoon with 8 oz of water daily 5/11/21  Yes Kristopher Maciel MD   Blood-Glucose Meter (True Metrix Glucose Meter) misc Check glucose once a day 3/18/21  Yes Ericka Dupree MD   glucose blood VI test strips (True Metrix Glucose Test Strip) strip Check glucose once a day 3/18/21  Yes Ericka Dupree MD   lancets misc Check glucose once a day 3/18/21  Yes Ericka Dupree MD   Blood-Glucose Meter monitoring kit Check once a day 3/9/21  Yes Ericka Dupree MD   lancets misc Check once a day 3/9/21  Yes Ericka Dupree MD   ondansetron (Zofran ODT) 4 mg disintegrating tablet Take 1 Tab by mouth every eight (8) hours as needed for Nausea or Vomiting. 2/16/21  Yes Aric Sparks NP   butalbital-acetaminophen-caff (Fioricet) -40 mg per capsule Take 1 Cap by mouth every four (4) hours as needed for Headache for up to 10 doses.  2/16/21  Yes Aric Sparks NP        No Known Allergies    Review of Systems:  ROS    A comprehensive review of systems was preformed and it is negative except mentioned in HPI    Objective:     Vitals: 11/10/21 1524 11/10/21 1528   BP: (!) 135/90 (!) 141/92   Pulse: 80 77   Temp: 98.4 °F (36.9 °C)    TempSrc: Temporal    SpO2: 98%    Weight: 117 lb 4.8 oz (53.2 kg)    Height: 5' 7\" (1.702 m)         Physical Exam:    Physical Exam  Vitals and nursing note reviewed. Constitutional:       Appearance: Normal appearance. HENT:      Head: Normocephalic and atraumatic. Cardiovascular:      Rate and Rhythm: Normal rate and regular rhythm. Pulmonary:      Effort: Pulmonary effort is normal.      Breath sounds: Normal breath sounds. Neurological:      Mental Status: She is alert. 3-9-2021:  diabetic foot exam:  Bilateral diabetic foot exam was performed today. Dorsalis pedis pulses 2+ bilaterally. Monofilament sensation decreased bilaterally. No ulcers or skin breakdown. Labs and Imaging:  Results for Joni Appiah (MRN [id number]) as of 3/9/2021 14:10   Ref. Range 2/16/2021 13:48   Sodium Latest Ref Range: 136 - 145 mmol/L 135 (L)   Potassium Latest Ref Range: 3.5 - 5.1 mmol/L 3.8   Chloride Latest Ref Range: 97 - 108 mmol/L 102   CO2 Latest Ref Range: 21 - 32 mmol/L 25   Anion gap Latest Ref Range: 5 - 15 mmol/L 8   Glucose Latest Ref Range: 65 - 100 mg/dL 189 (H)   BUN Latest Ref Range: 6 - 20 mg/dL 6   Creatinine Latest Ref Range: 0.55 - 1.02 mg/dL 0.71   BUN/Creatinine ratio Latest Ref Range: 12 - 20   8 (L)   Calcium Latest Ref Range: 8.5 - 10.1 mg/dL 9.5   GFR est non-AA Latest Ref Range: >60 ml/min/1.73m2 >60   GFR est AA Latest Ref Range: >60 ml/min/1.73m2 >60   Results for Joni Appiah (MRN [id number]) as of 3/9/2021 14:10   Ref.  Range 11/14/2020 13:00   Triglyceride Latest Ref Range: <150 mg/dL 85   Cholesterol, total Latest Ref Range: <200 mg/dL 134   HDL Cholesterol Latest Units: mg/dL 49   CHOL/HDL Ratio Latest Ref Range: 0.0 - 5.0   2.7   VLDL, calculated Latest Units: mg/dL 17   LDL, calculated Latest Ref Range: 0 - 100 mg/dL 68   Results for EDEN ORTIZ (MRN [id number]) as of 3/9/2021 14:10   Ref. Range 11/14/2020 03:45   Bilirubin, total Latest Ref Range: 0.2 - 1.0 mg/dL 0.7   Protein, total Latest Ref Range: 6.4 - 8.2 g/dL 8.4 (H)   Albumin Latest Ref Range: 3.5 - 5.0 g/dL 4.0   Globulin Latest Ref Range: 2.0 - 4.0 g/dL 4.4 (H)   A-G Ratio Latest Ref Range: 1.1 - 2.2   0.9 (L)   ALT Latest Ref Range: 12 - 78 U/L 18   AST Latest Ref Range: 15 - 37 U/L 14 (L)   Alk. phosphatase Latest Ref Range: 45 - 117 U/L 94   Results for Reina Lopes (MRN [id number]) as of 3/9/2021 14:10   Ref. Range 11/14/2020 08:35   TSH Latest Ref Range: 0.36 - 3.74 uIU/mL 0.70     Last 3 Recorded Weights in this Encounter    11/10/21 1524   Weight: 117 lb 4.8 oz (53.2 kg)        Lab Results   Component Value Date/Time    Hemoglobin A1c 7.0 (H) 03/28/2017 10:45 AM    Hemoglobin A1c 11.6 (H) 11/07/2016 12:22 PM    Hemoglobin A1c 6.0 (H) 06/18/2014 10:21 AM        Assessment:     Patient Active Problem List   Diagnosis Code    HTN (hypertension), benign I10    Diabetes mellitus ([facility] [address] E11.9    Cholesterol serum increased     Menorrhagia, premenopausal N92.4    Tobacco abuse Z72.0    Anemia D64.9    Hyperlipidemia E78.5    Anxiety F41.9    Emotional lability R45.86    Toenail fungus B35.1    H/O medication noncompliance Z91.14    Constipation K59.00    UTI (urinary tract infection) N39.0    Vitamin D deficiency E55.9    Bipolar disorder ([facility] [address] F31.9           Plan:     Type 2 diabetes mellitus, uncontrolled:  Hemoglobin A1c is 7.8% on 3-9-2021, 9.7% today. Fingerstick blood glucose is 315 mg/dL in my office today. Urine negative for ketones. Up to date with diabetes related annual labs: November 2020 except urine microalbumin/creatinine ratio  Up to date with diabetic eye exam: Due  Plan:  Discussed with patient importance of controlling diabetes to prevent endorgan damage.   Discussed with patient about practical ways of utilizing the resources she has, instead of relying on canned foods, starchy foods and foods, discussed with patient about may be eating more fruits and vegetables that she can cook in the microwave, chicken, etc. that she can eat and microwave. Advised patient to meal prep and take her lunch to work from home. Discussed with patient about avoiding fruit juice, instead stock up on water bottles and take that to work. Sending prescription for glucometer and test strips again. Check blood glucose twice a day. Looking at how much weight she has lost I am going to restart Lantus, we will do 10 units daily in the evening. Continue Metformin 1000 mg twice a day. Referring for diabetic eye exam again. I will see her back in my office and 3 months. Essential hypertension:  Blood pressure is elevated today. She has not been taking blood pressure medication. She was previously on lisinopril-hydrochlorothiazide 20-25 mg daily. Looking at her current weight I am only going to put her on lisinopril 10 mg daily. History of bipolar disorder, depression, anxiety:  Advised patient to find a primary care physician. Tinea pedis:  Treated by podiatry. History of polysubstance abuse and medication noncompliance    Orders Placed This Encounter    REFERRAL TO OPHTHALMOLOGY     Referral Priority:   Routine     Referral Type:   Consultation     Referral Reason:   Specialty Services Required     Referred to Provider:   Antionette Maciel MD     Requested Specialty:   Ophthalmology     Number of Visits Requested:   1    AMB POC GLUCOSE BLOOD, BY GLUCOSE MONITORING DEVICE    AMB POC HEMOGLOBIN A1C    AMB POC URINALYSIS DIP STICK AUTO W/O MICRO    insulin glargine (LANTUS,BASAGLAR) 100 unit/mL (3 mL) inpn     Sig: Inject 10 units at bedtime     Dispense:  1 Adjustable Dose Pre-filled Pen Syringe     Refill:  3    metFORMIN (GLUCOPHAGE) 1,000 mg tablet     Sig: Take 1 Tablet by mouth two (2) times daily (with meals) for 90 days.      Dispense:  180 Tablet     Refill:  1    Insulin Needles, Disposable, 31 gauge x 5/16\" ndle     Sig: Once a day     Dispense:  1 Each     Refill:  3    glucose blood VI test strips (OneTouch Verio test strips) strip     Sig: Check glucose twice a day     Dispense:  100 Strip     Refill:  5    Blood-Glucose Meter (OneTouch Verio IQ Meter) monitoring kit     Sig: Check glucose twice a day     Dispense:  1 Kit     Refill:  0    lancets (One Touch Delica) 33 gauge misc     Sig: Check glucose twice a day     Dispense:  100 Each     Refill:  5    lisinopriL (PRINIVIL, ZESTRIL) 10 mg tablet     Sig: Take 1 Tablet by mouth daily for 30 days.      Dispense:  30 Tablet     Refill:  3        Signed By: Sadia Ramirez MD     November 10, 2021      Return to clinic 3 months

## 2021-12-27 ENCOUNTER — HOSPITAL ENCOUNTER (EMERGENCY)
Age: 41
Discharge: HOME | End: 2021-12-27
Attending: EMERGENCY MEDICINE
Payer: MEDICAID

## 2021-12-27 ENCOUNTER — APPOINTMENT (OUTPATIENT)
Dept: GENERAL RADIOLOGY | Age: 41
End: 2021-12-27
Attending: EMERGENCY MEDICINE
Payer: MEDICAID

## 2021-12-27 VITALS
HEART RATE: 86 BPM | WEIGHT: 117 LBS | DIASTOLIC BLOOD PRESSURE: 90 MMHG | BODY MASS INDEX: 18.36 KG/M2 | OXYGEN SATURATION: 100 % | TEMPERATURE: 98.8 F | SYSTOLIC BLOOD PRESSURE: 194 MMHG | RESPIRATION RATE: 16 BRPM | HEIGHT: 67 IN

## 2021-12-27 DIAGNOSIS — R11.2 NON-INTRACTABLE VOMITING WITH NAUSEA, UNSPECIFIED VOMITING TYPE: Primary | ICD-10-CM

## 2021-12-27 LAB
ALBUMIN SERPL-MCNC: 4.8 G/DL (ref 3.5–5)
ALBUMIN/GLOB SERPL: 1.2 {RATIO} (ref 1.1–2.2)
ALP SERPL-CCNC: 66 U/L (ref 45–117)
ALT SERPL-CCNC: 18 U/L (ref 12–78)
ANION GAP SERPL CALC-SCNC: 14 MMOL/L (ref 5–15)
APPEARANCE UR: ABNORMAL
AST SERPL W P-5'-P-CCNC: 10 U/L (ref 15–37)
ATRIAL RATE: 86 BPM
BACTERIA URNS QL MICRO: ABNORMAL /HPF
BASOPHILS # BLD: 0 K/UL (ref 0–0.1)
BASOPHILS NFR BLD: 0 % (ref 0–1)
BILIRUB SERPL-MCNC: 0.6 MG/DL (ref 0.2–1)
BILIRUB UR QL: NEGATIVE
BUN SERPL-MCNC: 20 MG/DL (ref 6–20)
BUN/CREAT SERPL: 24 (ref 12–20)
CA-I BLD-MCNC: 9.9 MG/DL (ref 8.5–10.1)
CALCULATED P AXIS, ECG09: 80 DEGREES
CALCULATED R AXIS, ECG10: 83 DEGREES
CALCULATED T AXIS, ECG11: 51 DEGREES
CHLORIDE SERPL-SCNC: 96 MMOL/L (ref 97–108)
CO2 SERPL-SCNC: 29 MMOL/L (ref 21–32)
COLOR UR: YELLOW
CREAT SERPL-MCNC: 0.85 MG/DL (ref 0.55–1.02)
DIAGNOSIS, 93000: NORMAL
DIFFERENTIAL METHOD BLD: ABNORMAL
EOSINOPHIL # BLD: 0 K/UL (ref 0–0.4)
EOSINOPHIL NFR BLD: 0 % (ref 0–7)
EPITH CASTS URNS QL MICRO: ABNORMAL /LPF
ERYTHROCYTE [DISTWIDTH] IN BLOOD BY AUTOMATED COUNT: 12.3 % (ref 11.5–14.5)
GLOBULIN SER CALC-MCNC: 3.9 G/DL (ref 2–4)
GLUCOSE BLD STRIP.AUTO-MCNC: 177 MG/DL (ref 65–117)
GLUCOSE SERPL-MCNC: 215 MG/DL (ref 65–100)
GLUCOSE UR STRIP.AUTO-MCNC: >1000 MG/DL
HCT VFR BLD AUTO: 40.8 % (ref 35–47)
HGB BLD-MCNC: 14.2 G/DL (ref 11.5–16)
HGB UR QL STRIP: NEGATIVE
IMM GRANULOCYTES # BLD AUTO: 0 K/UL (ref 0–0.04)
IMM GRANULOCYTES NFR BLD AUTO: 0 % (ref 0–0.5)
KETONES UR QL STRIP.AUTO: >80 MG/DL
LEUKOCYTE ESTERASE UR QL STRIP.AUTO: NEGATIVE
LYMPHOCYTES # BLD: 0.7 K/UL (ref 0.8–3.5)
LYMPHOCYTES NFR BLD: 11 % (ref 12–49)
MCH RBC QN AUTO: 30.2 PG (ref 26–34)
MCHC RBC AUTO-ENTMCNC: 34.8 G/DL (ref 30–36.5)
MCV RBC AUTO: 86.8 FL (ref 80–99)
MONOCYTES # BLD: 0.2 K/UL (ref 0–1)
MONOCYTES NFR BLD: 3 % (ref 5–13)
NEUTS SEG # BLD: 5.4 K/UL (ref 1.8–8)
NEUTS SEG NFR BLD: 86 % (ref 32–75)
NITRITE UR QL STRIP.AUTO: NEGATIVE
P-R INTERVAL, ECG05: 138 MS
PERFORMED BY, TECHID: ABNORMAL
PH UR STRIP: 7 [PH] (ref 5–8)
PLATELET # BLD AUTO: 331 K/UL (ref 150–400)
PMV BLD AUTO: 9.7 FL (ref 8.9–12.9)
POTASSIUM SERPL-SCNC: 3.3 MMOL/L (ref 3.5–5.1)
PROT SERPL-MCNC: 8.7 G/DL (ref 6.4–8.2)
PROT UR STRIP-MCNC: NEGATIVE MG/DL
Q-T INTERVAL, ECG07: 378 MS
QRS DURATION, ECG06: 94 MS
QTC CALCULATION (BEZET), ECG08: 452 MS
RBC # BLD AUTO: 4.7 M/UL (ref 3.8–5.2)
RBC #/AREA URNS HPF: ABNORMAL /HPF (ref 0–5)
SODIUM SERPL-SCNC: 139 MMOL/L (ref 136–145)
SP GR UR REFRACTOMETRY: 1.02 (ref 1–1.03)
TROPONIN-HIGH SENSITIVITY: 6 NG/L (ref 0–51)
UROBILINOGEN UR QL STRIP.AUTO: 0.1 EU/DL (ref 0.2–1)
VENTRICULAR RATE, ECG03: 86 BPM
WBC # BLD AUTO: 6.3 K/UL (ref 3.6–11)
WBC URNS QL MICRO: ABNORMAL /HPF (ref 0–4)

## 2021-12-27 PROCEDURE — 82962 GLUCOSE BLOOD TEST: CPT

## 2021-12-27 PROCEDURE — 71045 X-RAY EXAM CHEST 1 VIEW: CPT

## 2021-12-27 PROCEDURE — 99285 EMERGENCY DEPT VISIT HI MDM: CPT

## 2021-12-27 PROCEDURE — 80053 COMPREHEN METABOLIC PANEL: CPT

## 2021-12-27 PROCEDURE — 85025 COMPLETE CBC W/AUTO DIFF WBC: CPT

## 2021-12-27 PROCEDURE — 36415 COLL VENOUS BLD VENIPUNCTURE: CPT

## 2021-12-27 PROCEDURE — 74011250636 HC RX REV CODE- 250/636: Performed by: EMERGENCY MEDICINE

## 2021-12-27 PROCEDURE — 96374 THER/PROPH/DIAG INJ IV PUSH: CPT

## 2021-12-27 PROCEDURE — 84484 ASSAY OF TROPONIN QUANT: CPT

## 2021-12-27 PROCEDURE — 74011250637 HC RX REV CODE- 250/637: Performed by: EMERGENCY MEDICINE

## 2021-12-27 PROCEDURE — 81001 URINALYSIS AUTO W/SCOPE: CPT

## 2021-12-27 PROCEDURE — 93005 ELECTROCARDIOGRAM TRACING: CPT

## 2021-12-27 RX ORDER — ONDANSETRON 4 MG/1
4 TABLET, ORALLY DISINTEGRATING ORAL
Qty: 9 TABLET | Refills: 0 | Status: SHIPPED | OUTPATIENT
Start: 2021-12-27 | End: 2021-12-30

## 2021-12-27 RX ORDER — POTASSIUM CHLORIDE 1.5 G/1.77G
40 POWDER, FOR SOLUTION ORAL
Status: COMPLETED | OUTPATIENT
Start: 2021-12-27 | End: 2021-12-27

## 2021-12-27 RX ORDER — ONDANSETRON 2 MG/ML
4 INJECTION INTRAMUSCULAR; INTRAVENOUS
Status: COMPLETED | OUTPATIENT
Start: 2021-12-27 | End: 2021-12-27

## 2021-12-27 RX ADMIN — ONDANSETRON 4 MG: 2 INJECTION INTRAMUSCULAR; INTRAVENOUS at 09:28

## 2021-12-27 RX ADMIN — POTASSIUM CHLORIDE 40 MEQ: 1.5 FOR SOLUTION ORAL at 11:19

## 2021-12-27 RX ADMIN — SODIUM CHLORIDE 1000 ML: 9 INJECTION, SOLUTION INTRAVENOUS at 08:07

## 2021-12-27 NOTE — ED PROVIDER NOTES
EMERGENCY DEPARTMENT HISTORY AND PHYSICAL EXAM      Date: 12/27/2021  Patient Name: Donavan Cantor      History of Presenting Illness     Chief Complaint   Patient presents with    Vomiting       History Provided By: Patient    HPI: Donavan Cantor, 39 y.o. female with a past medical history significant diabetes and hypertension presents to the ED with cc of nonradiating periumbilical abdominal pain, hyperglycemia, vomiting and chest pain which started last night. Patient states that she has had these symptoms previously. She reports being out of her injectable insulin for the last 2 days. She has been compliant with her Metformin. She states that EMS came out to evaluate her earlier, however she declined transportation at that time. Patient reports no fevers, chills dizziness, lightheadedness. There are no other complaints, changes, or physical findings at this time. PCP: Cecilia Valadez MD    Current Facility-Administered Medications   Medication Dose Route Frequency Provider Last Rate Last Admin    sodium chloride 0.9 % bolus infusion 1,000 mL  1,000 mL IntraVENous ONCE Amy [place], DO         Current Outpatient Medications   Medication Sig Dispense Refill    insulin glargine (LANTUS,BASAGLAR) 100 unit/mL (3 mL) inpn Inject 10 units at bedtime 1 Adjustable Dose Pre-filled Pen Syringe 3    metFORMIN (GLUCOPHAGE) 1,000 mg tablet Take 1 Tablet by mouth two (2) times daily (with meals) for 90 days. 180 Tablet 1    Insulin Needles, Disposable, 31 gauge x 5/16\" ndle Once a day 1 Each 3    glucose blood VI test strips (OneTouch Verio test strips) strip Check glucose twice a day 100 Strip 5    Blood-Glucose Meter (OneTouch Verio IQ Meter) monitoring kit Check glucose twice a day 1 Kit 0    lancets (One Touch Delica) 33 gauge misc Check glucose twice a day 100 Each 5    metoprolol succinate (TOPROL-XL) 25 mg XL tablet Take 2 Tablets by mouth daily for 90 days.  Indications: paroxysmal supraventricular tachycardia 180 Tablet 1    polyethylene glycol (Miralax) 17 gram/dose powder Take 17 g by mouth daily. 1 tablespoon with 8 oz of water daily 235 g 0    Blood-Glucose Meter (True Metrix Glucose Meter) misc Check glucose once a day 1 Each 0    glucose blood VI test strips (True Metrix Glucose Test Strip) strip Check glucose once a day 50 Strip 5    lancets misc Check glucose once a day 50 Each 5    Blood-Glucose Meter monitoring kit Check once a day 1 Kit 0    lancets misc Check once a day 50 Each 5    ondansetron (Zofran ODT) 4 mg disintegrating tablet Take 1 Tab by mouth every eight (8) hours as needed for Nausea or Vomiting. 10 Tab 1    butalbital-acetaminophen-caff (Fioricet) -40 mg per capsule Take 1 Cap by mouth every four (4) hours as needed for Headache for up to 10 doses.  10 Cap 0       Past History     Past Medical History:  Past Medical History:   Diagnosis Date    Cholesterol serum increased 5/19/2010    Diabetes mellitus ([facility] [address] 5/19/2010    HTN (hypertension), benign 5/19/2010    Vitamin D deficiency 11/16/2020       Past Surgical History:  Past Surgical History:   Procedure Laterality Date    HX GYN      s/p Hysterectomy       Family History:  Family History   Problem Relation Age of Onset    Diabetes Mother     Cancer Father     Alcohol abuse Neg Hx     Arthritis-rheumatoid Neg Hx     Asthma Neg Hx     Bleeding Prob Neg Hx     Elevated Lipids Neg Hx     Headache Neg Hx     Heart Disease Neg Hx     Hypertension Neg Hx     Lung Disease Neg Hx     Migraines Neg Hx     Psychiatric Disorder Neg Hx     Stroke Neg Hx     Mental Retardation Neg Hx        Social History:  Social History     Tobacco Use    Smoking status: Current Every Day Smoker     Packs/day: 1.00    Smokeless tobacco: Never Used   Vaping Use    Vaping Use: Never used   Substance Use Topics    Alcohol use: No    Drug use: No       Allergies:  No Known Allergies      Review of Systems Review of Systems   Constitutional: Negative for chills and fever. HENT: Negative for congestion and sore throat. Eyes: Negative for pain and visual disturbance. Respiratory: Negative for cough and shortness of breath. Cardiovascular: Positive for chest pain. Negative for palpitations. Gastrointestinal: Positive for abdominal pain, nausea and vomiting. Negative for constipation and diarrhea. Endocrine: Negative for cold intolerance and heat intolerance. Genitourinary: Negative for dysuria and frequency. Musculoskeletal: Negative for arthralgias and myalgias. Skin: Negative for color change and rash. Allergic/Immunologic: Negative for environmental allergies and food allergies. Neurological: Negative for dizziness, weakness, light-headedness and headaches. Hematological: Does not bruise/bleed easily. Psychiatric/Behavioral: Negative for agitation and confusion. Physical Exam     Physical Exam  Constitutional:       Appearance: Normal appearance. HENT:      Head: Normocephalic and atraumatic. Right Ear: External ear normal.      Left Ear: External ear normal.      Nose: Nose normal.      Mouth/Throat:      Mouth: Mucous membranes are moist.   Eyes:      Extraocular Movements: Extraocular movements intact. Conjunctiva/sclera: Conjunctivae normal.      Pupils: Pupils are equal, round, and reactive to light. Cardiovascular:      Rate and Rhythm: Normal rate and regular rhythm. Pulses: Normal pulses. Heart sounds: Normal heart sounds. Pulmonary:      Effort: Pulmonary effort is normal.      Breath sounds: Normal breath sounds. Abdominal:      General: Abdomen is flat. There is no distension. Palpations: Abdomen is soft. Tenderness: There is no abdominal tenderness. Musculoskeletal:         General: Normal range of motion. Cervical back: Normal range of motion. Skin:     General: Skin is warm and dry.       Capillary Refill: Capillary refill takes less than 2 seconds. Neurological:      Mental Status: She is alert and oriented to person, place, and time. Mental status is at baseline. Psychiatric:         Mood and Affect: Mood normal.         Behavior: Behavior normal.         Lab and Diagnostic Study Results     Labs -     Recent Results (from the past 12 hour(s))   GLUCOSE, POC    Collection Time: 12/27/21  7:31 AM   Result Value Ref Range    Glucose (POC) 177 (H) 65 - 117 mg/dL    Performed by Jose Luis Mejia        Radiologic Studies -   [unfilled]  CT Results  (Last 48 hours)    None        CXR Results  (Last 48 hours)    None          Medical Decision Making and ED Course   - I am the first and primary provider for this patient AND AM THE PRIMARY PROVIDER OF RECORD. - I reviewed the vital signs, available nursing notes, past medical history, past surgical history, family history and social history. - Initial assessment performed. The patients presenting problems have been discussed, and the staff are in agreement with the care plan formulated and outlined with them. I have encouraged them to ask questions as they arise throughout their visit. Vital Signs-Reviewed the patient's vital signs. Patient Vitals for the past 12 hrs:   Temp Pulse Resp BP SpO2   12/27/21 0731 98.8 °F (37.1 °C) 86 16 (!) 194/90 100 %       EKG interpretation: (Preliminary): Performed at 0747, and read at 0753  Normal sinus rhythm with a ventricular rate of 86, , QRS 94, QTc 452 without evidence of ST depression or elevation. Normal axis.       Records Reviewed: Nursing Notes    The patient presents with abdominal pain with a differential diagnosis of abdominal pain, appendicitis, biliary colic, cholecystitis, gastritis, gastroenteritis, GERD, obstruction, PUD, UTI and vomiting    ED Course:              Provider Notes (Medical Decision Making):   Patient is a 70-year-old female with past medical history significant for diabetes who presents to the emergency department for evaluation of periumbilical pain with associated nausea and vomiting x1 day. On physical examination, patient is resting comfortably in bed. She has mild abdominal tenderness without guarding, rebound, rigidity. CBC, CMP, troponin, EKG, chest x-ray demonstrating mild hypokalemia, glucosuria. Potassium replaced in the ED. Patient with a subjective improvement of her symptoms with 1 L normal saline IV bolus and Zofran. She will be discharged home at this time with prescription for Zofran and instructed to follow-up with her primary care physician and return to the ED if she develops any new or worsening symptoms. [facility]           Consultations:       Consultations: - NONE        Procedures and Critical Care       Performed by: Jermaine Sumner DO  PROCEDURES:  Procedures       Disposition     Disposition: Condition stable and improved  DC- Adult Discharges: All of the diagnostic tests were reviewed and questions answered. Diagnosis, care plan and treatment options were discussed. The patient understands the instructions and will follow up as directed. The patients results have been reviewed with them. They have been counseled regarding their diagnosis. The patient verbally convey understanding and agreement of the signs, symptoms, diagnosis, treatment and prognosis and additionally agrees to follow up as recommended with their PCP in 24 - 48 hours. They also agree with the care-plan and convey that all of their questions have been answered. I have also put together some discharge instructions for them that include: 1) educational information regarding their diagnosis, 2) how to care for their diagnosis at home, as well a 3) list of reasons why they would want to return to the ED prior to their follow-up appointment, should their condition change. DC-The patient was given verbal follow-up instructions        Remove if not discharged  DISCHARGE PLAN:  1.    Current Discharge Medication List      CONTINUE these medications which have NOT CHANGED    Details   insulin glargine (LANTUS,BASAGLAR) 100 unit/mL (3 mL) inpn Inject 10 units at bedtime  Qty: 1 Adjustable Dose Pre-filled Pen Syringe, Refills: 3    Associated Diagnoses: Type 2 diabetes mellitus with hyperglycemia, with long-term current use of insulin ([facility])      metFORMIN (GLUCOPHAGE) 1,000 mg tablet Take 1 Tablet by mouth two (2) times daily (with meals) for 90 days. Qty: 180 Tablet, Refills: 1    Associated Diagnoses: Type 2 diabetes mellitus with hyperglycemia, with long-term current use of insulin ([facility])      Insulin Needles, Disposable, 31 gauge x 5/16\" ndle Once a day  Qty: 1 Each, Refills: 3    Associated Diagnoses: Type 2 diabetes mellitus with hyperglycemia, with long-term current use of insulin ([address]      ! ! glucose blood VI test strips (OneTouch Verio test strips) strip Check glucose twice a day  Qty: 100 Strip, Refills: 5    Associated Diagnoses: Type 2 diabetes mellitus with hyperglycemia, with long-term current use of insulin ([address]      ! ! Blood-Glucose Meter (OneTouch Verio IQ Meter) monitoring kit Check glucose twice a day  Qty: 1 Kit, Refills: 0    Associated Diagnoses: Type 2 diabetes mellitus with hyperglycemia, with long-term current use of insulin ([facility])      lancets (One Touch Delica) 33 gauge misc Check glucose twice a day  Qty: 100 Each, Refills: 5    Associated Diagnoses: Type 2 diabetes mellitus with hyperglycemia, with long-term current use of insulin ([facility])      metoprolol succinate (TOPROL-XL) 25 mg XL tablet Take 2 Tablets by mouth daily for 90 days. Indications: paroxysmal supraventricular tachycardia  Qty: 180 Tablet, Refills: 1    Associated Diagnoses: PSVT (paroxysmal supraventricular tachycardia) ([facility])      polyethylene glycol (Miralax) 17 gram/dose powder Take 17 g by mouth daily.  1 tablespoon with 8 oz of water daily  Qty: 235 g, Refills: 0      Blood-Glucose Meter (True Metrix Glucose Meter) misc Check glucose once a day  Qty: 1 Each, Refills: 0    Associated Diagnoses: Type 2 diabetes mellitus with hyperglycemia, without long-term current use of insulin ([address]      ! ! glucose blood VI test strips (True Metrix Glucose Test Strip) strip Check glucose once a day  Qty: 50 Strip, Refills: 5    Associated Diagnoses: Type 2 diabetes mellitus with hyperglycemia, without long-term current use of insulin ([address]      ! ! lancets misc Check glucose once a day  Qty: 50 Each, Refills: 5    Associated Diagnoses: Type 2 diabetes mellitus with hyperglycemia, without long-term current use of insulin ([address]      ! ! Blood-Glucose Meter monitoring kit Check once a day  Qty: 1 Kit, Refills: 0    Associated Diagnoses: Type 2 diabetes mellitus with hyperglycemia, without long-term current use of insulin ([address]      ! ! lancets misc Check once a day  Qty: 50 Each, Refills: 5    Associated Diagnoses: Type 2 diabetes mellitus with hyperglycemia, without long-term current use of insulin ([facility])      ondansetron (Zofran ODT) 4 mg disintegrating tablet Take 1 Tab by mouth every eight (8) hours as needed for Nausea or Vomiting. Qty: 10 Tab, Refills: 1      butalbital-acetaminophen-caff (Fioricet) -40 mg per capsule Take 1 Cap by mouth every four (4) hours as needed for Headache for up to 10 doses. Qty: 10 Cap, Refills: 0       !! - Potential duplicate medications found. Please discuss with provider. 2.   Follow-up Information    None       3. Return to ED if worse   4. Current Discharge Medication List          Diagnosis     Clinical Impression: No diagnosis found. Attestations:    Ricarda Ritchie, DO    Please note that this dictation was completed with [employer], the computer voice recognition software. Quite often unanticipated grammatical, syntax, homophones, and other interpretive errors are inadvertently transcribed by the computer software. Please disregard these errors.   Please excuse any errors that have escaped final proofreading. Thank you.

## 2022-03-06 ENCOUNTER — APPOINTMENT (OUTPATIENT)
Dept: GENERAL RADIOLOGY | Age: 42
End: 2022-03-06
Attending: STUDENT IN AN ORGANIZED HEALTH CARE EDUCATION/TRAINING PROGRAM
Payer: MEDICAID

## 2022-03-06 ENCOUNTER — HOSPITAL ENCOUNTER (EMERGENCY)
Age: 42
Discharge: HOME | End: 2022-03-06
Attending: STUDENT IN AN ORGANIZED HEALTH CARE EDUCATION/TRAINING PROGRAM
Payer: MEDICAID

## 2022-03-06 VITALS
RESPIRATION RATE: 19 BRPM | SYSTOLIC BLOOD PRESSURE: 127 MMHG | WEIGHT: 115 LBS | OXYGEN SATURATION: 100 % | BODY MASS INDEX: 18.05 KG/M2 | HEART RATE: 92 BPM | TEMPERATURE: 98.2 F | DIASTOLIC BLOOD PRESSURE: 88 MMHG | HEIGHT: 67 IN

## 2022-03-06 DIAGNOSIS — N17.9 AKI (ACUTE KIDNEY INJURY) (HCC): Primary | ICD-10-CM

## 2022-03-06 DIAGNOSIS — K29.90 GASTRITIS AND DUODENITIS: ICD-10-CM

## 2022-03-06 DIAGNOSIS — E86.0 DEHYDRATION: ICD-10-CM

## 2022-03-06 LAB
ALBUMIN SERPL-MCNC: 4.8 G/DL (ref 3.5–5)
ALBUMIN/GLOB SERPL: 1 {RATIO} (ref 1.1–2.2)
ALP SERPL-CCNC: 76 U/L (ref 45–117)
ALT SERPL-CCNC: 31 U/L (ref 12–78)
ANION GAP SERPL CALC-SCNC: 10 MMOL/L (ref 5–15)
AST SERPL W P-5'-P-CCNC: ABNORMAL U/L (ref 15–37)
ATRIAL RATE: 98 BPM
BASOPHILS # BLD: 0 K/UL (ref 0–0.1)
BASOPHILS NFR BLD: 0 % (ref 0–1)
BILIRUB SERPL-MCNC: 1 MG/DL (ref 0.2–1)
BUN SERPL-MCNC: 34 MG/DL (ref 6–20)
BUN/CREAT SERPL: 26 (ref 12–20)
CA-I BLD-MCNC: 9.8 MG/DL (ref 8.5–10.1)
CALCULATED P AXIS, ECG09: 86 DEGREES
CALCULATED R AXIS, ECG10: 96 DEGREES
CALCULATED T AXIS, ECG11: 65 DEGREES
CHLORIDE SERPL-SCNC: 95 MMOL/L (ref 97–108)
CO2 SERPL-SCNC: 27 MMOL/L (ref 21–32)
CREAT SERPL-MCNC: 1.31 MG/DL (ref 0.55–1.02)
DIAGNOSIS, 93000: NORMAL
DIFFERENTIAL METHOD BLD: ABNORMAL
EOSINOPHIL # BLD: 0 K/UL (ref 0–0.4)
EOSINOPHIL NFR BLD: 0 % (ref 0–7)
ERYTHROCYTE [DISTWIDTH] IN BLOOD BY AUTOMATED COUNT: 13.4 % (ref 11.5–14.5)
GLOBULIN SER CALC-MCNC: 4.8 G/DL (ref 2–4)
GLUCOSE SERPL-MCNC: 259 MG/DL (ref 65–100)
HCT VFR BLD AUTO: 44.4 % (ref 35–47)
HGB BLD-MCNC: 15.6 G/DL (ref 11.5–16)
IMM GRANULOCYTES # BLD AUTO: 0 K/UL (ref 0–0.04)
IMM GRANULOCYTES NFR BLD AUTO: 0 % (ref 0–0.5)
LYMPHOCYTES # BLD: 1.5 K/UL (ref 0.8–3.5)
LYMPHOCYTES NFR BLD: 22 % (ref 12–49)
MCH RBC QN AUTO: 29.5 PG (ref 26–34)
MCHC RBC AUTO-ENTMCNC: 35.1 G/DL (ref 30–36.5)
MCV RBC AUTO: 84.1 FL (ref 80–99)
MONOCYTES # BLD: 0.6 K/UL (ref 0–1)
MONOCYTES NFR BLD: 9 % (ref 5–13)
NEUTS SEG # BLD: 4.6 K/UL (ref 1.8–8)
NEUTS SEG NFR BLD: 69 % (ref 32–75)
NRBC # BLD: 0 K/UL (ref 0–0.01)
NRBC BLD-RTO: 0 PER 100 WBC
P-R INTERVAL, ECG05: 130 MS
PLATELET # BLD AUTO: 407 K/UL (ref 150–400)
PMV BLD AUTO: 9.6 FL (ref 8.9–12.9)
POTASSIUM SERPL-SCNC: ABNORMAL MMOL/L (ref 3.5–5.1)
PROT SERPL-MCNC: 9.6 G/DL (ref 6.4–8.2)
Q-T INTERVAL, ECG07: 350 MS
QRS DURATION, ECG06: 78 MS
QTC CALCULATION (BEZET), ECG08: 446 MS
RBC # BLD AUTO: 5.28 M/UL (ref 3.8–5.2)
SODIUM SERPL-SCNC: 132 MMOL/L (ref 136–145)
TROPONIN-HIGH SENSITIVITY: 7 NG/L (ref 0–51)
TROPONIN-HIGH SENSITIVITY: 9 NG/L (ref 0–51)
VENTRICULAR RATE, ECG03: 98 BPM
WBC # BLD AUTO: 6.8 K/UL (ref 3.6–11)

## 2022-03-06 PROCEDURE — 93005 ELECTROCARDIOGRAM TRACING: CPT

## 2022-03-06 PROCEDURE — 96375 TX/PRO/DX INJ NEW DRUG ADDON: CPT

## 2022-03-06 PROCEDURE — 74011250636 HC RX REV CODE- 250/636: Performed by: STUDENT IN AN ORGANIZED HEALTH CARE EDUCATION/TRAINING PROGRAM

## 2022-03-06 PROCEDURE — 36415 COLL VENOUS BLD VENIPUNCTURE: CPT

## 2022-03-06 PROCEDURE — 96374 THER/PROPH/DIAG INJ IV PUSH: CPT

## 2022-03-06 PROCEDURE — 85025 COMPLETE CBC W/AUTO DIFF WBC: CPT

## 2022-03-06 PROCEDURE — 80053 COMPREHEN METABOLIC PANEL: CPT

## 2022-03-06 PROCEDURE — 74011000250 HC RX REV CODE- 250: Performed by: STUDENT IN AN ORGANIZED HEALTH CARE EDUCATION/TRAINING PROGRAM

## 2022-03-06 PROCEDURE — 71045 X-RAY EXAM CHEST 1 VIEW: CPT

## 2022-03-06 PROCEDURE — 99285 EMERGENCY DEPT VISIT HI MDM: CPT

## 2022-03-06 PROCEDURE — 84484 ASSAY OF TROPONIN QUANT: CPT

## 2022-03-06 RX ORDER — ONDANSETRON 4 MG/1
4 TABLET, FILM COATED ORAL
Qty: 20 TABLET | Refills: 0 | OUTPATIENT
Start: 2022-03-06 | End: 2022-05-14

## 2022-03-06 RX ORDER — FAMOTIDINE 20 MG/1
20 TABLET, FILM COATED ORAL 2 TIMES DAILY
Qty: 20 TABLET | Refills: 0 | Status: SHIPPED | OUTPATIENT
Start: 2022-03-06 | End: 2022-03-16

## 2022-03-06 RX ORDER — ONDANSETRON 2 MG/ML
4 INJECTION INTRAMUSCULAR; INTRAVENOUS
Status: COMPLETED | OUTPATIENT
Start: 2022-03-06 | End: 2022-03-06

## 2022-03-06 RX ADMIN — SODIUM CHLORIDE 1000 ML: 9 INJECTION, SOLUTION INTRAVENOUS at 08:59

## 2022-03-06 RX ADMIN — ONDANSETRON 4 MG: 2 INJECTION INTRAMUSCULAR; INTRAVENOUS at 08:58

## 2022-03-06 RX ADMIN — SODIUM CHLORIDE, PRESERVATIVE FREE 20 MG: 5 INJECTION INTRAVENOUS at 08:58

## 2022-03-06 NOTE — ED PROVIDER NOTES
[facility]  EMERGENCY DEPARTMENT ENCOUNTER NOTE    Date: 3/6/2022  Patient Name: Arun Lopez    History of Presenting Illness     Chief Complaint   Patient presents with    Chest Pain    Vomiting     HPI: Arun Lopez, 39 y.o. female with a past medical history and outpatient medications as listed and reviewed below  presents for vomiting. The patient has a 1 day history of nausea and vomiting that was described as nonbloody and nonbillious. Diarrhea was not present. Abdominal pain was not present. Patient has not been able to tolerate PO intake. Patient reports lightheadedness, but denies syncope, palpitations, or decreased urine output. Patient denies fevers, chills, dysuria, hematuria, shortness of breath, cough, rhinorrhea, sore throat. No other acute complaints. She does report pressure like CP.     Medical History   I reviewed the medical, surgical, family, and social history, as well as allergies:    PCP: Rory, MD Cecilia    Past Medical History:  Past Medical History:   Diagnosis Date    Cholesterol serum increased 5/19/2010    Diabetes mellitus ([address] 5/19/2010    HTN (hypertension), benign 5/19/2010    Vitamin D deficiency 11/16/2020     Past Surgical History:  Past Surgical History:   Procedure Laterality Date    HX GYN      s/p Hysterectomy     Current Outpatient Medications:  Current Outpatient Medications   Medication Instructions    Blood-Glucose Meter (OneTouch Verio IQ Meter) monitoring kit Check glucose twice a day    Blood-Glucose Meter (True Metrix Glucose Meter) misc Check glucose once a day    Blood-Glucose Meter monitoring kit Check once a day    butalbital-acetaminophen-caff (Fioricet) -40 mg per capsule 1 Capsule, Oral, EVERY 4 HOURS AS NEEDED    famotidine (PEPCID) 20 mg, Oral, 2 TIMES DAILY    glucose blood VI test strips (OneTouch Verio test strips) strip Check glucose twice a day    glucose blood VI test strips (True Metrix Glucose Test Strip) strip Check glucose once a day    insulin glargine (LANTUS,BASAGLAR) 100 unit/mL (3 mL) inpn Inject 10 units at bedtime    Insulin Needles, Disposable, 31 gauge x 5/16\" ndle Once a day    lancets (One Touch Delica) 33 gauge misc Check glucose twice a day    lancets misc Check once a day    lancets misc Check glucose once a day    ondansetron hcl (ZOFRAN) 4 mg, Oral, EVERY 8 HOURS AS NEEDED    polyethylene glycol (MIRALAX) 17 g, Oral, DAILY, 1 tablespoon with 8 oz of water daily      Family History:  Family History   Problem Relation Age of Onset    Diabetes Mother     Cancer Father     Alcohol abuse Neg Hx     Arthritis-rheumatoid Neg Hx     Asthma Neg Hx     Bleeding Prob Neg Hx     Elevated Lipids Neg Hx     Headache Neg Hx     Heart Disease Neg Hx     Hypertension Neg Hx     Lung Disease Neg Hx     Migraines Neg Hx     Psychiatric Disorder Neg Hx     Stroke Neg Hx     Mental Retardation Neg Hx      Social History:  Social History     Tobacco Use    Smoking status: Current Every Day Smoker     Packs/day: 1.00    Smokeless tobacco: Never Used   Vaping Use    Vaping Use: Never used   Substance Use Topics    Alcohol use: No    Drug use: No     Allergies:  No Known Allergies    Review of Systems     All other systems negative. Physical Exam and Vital Signs   Vital Signs - Reviewed the patient's vital signs.     Patient Vitals for the past 12 hrs:   Temp Pulse Resp BP SpO2   03/06/22 1033 -- 92 19 127/88 100 %   03/06/22 0806 98.2 °F (36.8 °C) (!) 101 16 (!) 135/96 100 %     Physical Exam:    GENERAL: awake, alert, cooperative, not in distress  HEENT:  * Pupils equal, EOMI  * Head atraumatic  CV:  * regular rhythm  * warm and perfused extremities bilaterally  PULMONARY: Good air movement, no wheezes or crackles  ABDOMEN/: soft, no distension, no guarding, no suprapubic tenderness, no abdominal tenderness  EXTREMITIES/BACK: warm and perfused, no tenderness, no edema  SKIN: no rashes or signs of trauma  NEURO:  * Speech clear  * Moves U&LE to command    Medical Decision Making   - I am the first and primary provider for this patient and am the primary provider of record. - I reviewed the vital signs, available nursing notes, past medical history, past surgical history, family history and social history. - Initial assessment performed. The patients presenting problems have been discussed, and the staff are in agreement with the care plan formulated and outlined with them. I have encouraged them to ask questions as they arise throughout their visit. - Available medical records, nursing notes, old EKGs, and EMS run sheets (if patient was EMS transported) were reviewed    MDM:   Patient is a 39 y.o. female presenting for vomiting. Vitals reveal no significant abnormalities and physical exam reveals no significant abnormalities. Based on the history, physical exam, risk factors, and vitals signs, differential includes: gastroenteritis, gastritis, GERD. This well-appearing patient presents with vomiting with low suspicion for serious pathology given nontoxic appearance and benign physical examination. Differential also includes:    - Dehydration or electrolyte abnormalities: Patient has no symptoms, physical exam signs or vital sign findings to suggest clinically significant dehydration.  - Abdominal Pathologies: No symptoms or physical exam findings of abdominal tenderness or guarding to suggest intraabdominal pathology like appendicitis, hepatitis, obstruction, or pancreatitis.   - UTI: Patient is unlikely to have a UTI as there is no urinary symptoms with a normal exam.  - CNS: No altered mental status, seizures, significant headache, meningismus, or toxic appearance to suggest meningitis/encephalitis or other CNS processes such as increased ICP.  - Diabetes: DKA is unlikely as cause of vomiting.  - Medication induced: No reported history of medication exposure or overdose. Will initiate symptomatic treatments. See ED Course and Reassessment for clinical evaluation and interpretations. Results     Labs:  Recent Results (from the past 12 hour(s))   CBC WITH AUTOMATED DIFF    Collection Time: 03/06/22  8:14 AM   Result Value Ref Range    WBC 6.8 3.6 - 11.0 K/uL    RBC 5.28 (H) 3.80 - 5.20 M/uL    HGB 15.6 11.5 - 16.0 g/dL    HCT 44.4 35.0 - 47.0 %    MCV 84.1 80.0 - 99.0 FL    MCH 29.5 26.0 - 34.0 PG    MCHC 35.1 30.0 - 36.5 g/dL    RDW 13.4 11.5 - 14.5 %    PLATELET 594 (H) 103 - 400 K/uL    MPV 9.6 8.9 - 12.9 FL    NRBC 0.0 0.0  WBC    ABSOLUTE NRBC 0.00 0.00 - 0.01 K/uL    NEUTROPHILS 69 32 - 75 %    LYMPHOCYTES 22 12 - 49 %    MONOCYTES 9 5 - 13 %    EOSINOPHILS 0 0 - 7 %    BASOPHILS 0 0 - 1 %    IMMATURE GRANULOCYTES 0 0 - 0.5 %    ABS. NEUTROPHILS 4.6 1.8 - 8.0 K/UL    ABS. LYMPHOCYTES 1.5 0.8 - 3.5 K/UL    ABS. MONOCYTES 0.6 0.0 - 1.0 K/UL    ABS. EOSINOPHILS 0.0 0.0 - 0.4 K/UL    ABS. BASOPHILS 0.0 0.0 - 0.1 K/UL    ABS. IMM. GRANS. 0.0 0.00 - 0.04 K/UL    DF AUTOMATED     METABOLIC PANEL, COMPREHENSIVE    Collection Time: 03/06/22  8:14 AM   Result Value Ref Range    Sodium 132 (L) 136 - 145 mmol/L    Potassium Hemolyzed, recollect requested 3.5 - 5.1 mmol/L    Chloride 95 (L) 97 - 108 mmol/L    CO2 27 21 - 32 mmol/L    Anion gap 10 5 - 15 mmol/L    Glucose 259 (H) 65 - 100 mg/dL    BUN 34 (H) 6 - 20 mg/dL    Creatinine 1.31 (H) 0.55 - 1.02 mg/dL    BUN/Creatinine ratio 26 (H) 12 - 20      GFR est AA 54 (L) >60 ml/min/1.73m2    GFR est non-AA 45 (L) >60 ml/min/1.73m2    Calcium 9.8 8.5 - 10.1 mg/dL    Bilirubin, total 1.0 0.2 - 1.0 mg/dL    AST (SGOT) Hemolyzed, recollect requested 15 - 37 U/L    ALT (SGPT) 31 12 - 78 U/L    Alk.  phosphatase 76 45 - 117 U/L    Protein, total 9.6 (H) 6.4 - 8.2 g/dL    Albumin 4.8 3.5 - 5.0 g/dL    Globulin 4.8 (H) 2.0 - 4.0 g/dL    A-G Ratio 1.0 (L) 1.1 - 2.2     TROPONIN-HIGH SENSITIVITY    Collection Time: 03/06/22  8:14 AM   Result Value Ref Range    Troponin-High Sensitivity 7 0 - 51 ng/L   TROPONIN-HIGH SENSITIVITY    Collection Time: 03/06/22 10:11 AM   Result Value Ref Range    Troponin-High Sensitivity 9 0 - 51 ng/L     Radiologic Studies:  CT Results  (Last 48 hours)    None        CXR Results  (Last 48 hours)               03/06/22 0825  XR CHEST PORT Final result    Impression:  Negative. Narrative:  AP upright view of the chest compared to prior exam dated 12/27/2021. Heart size   is normal. Lungs are clear of infiltrate. No pulmonary nodule or pleural   effusion is seen. No bony abnormalities are identified. Medications ordered:  Medications   ondansetron (ZOFRAN) injection 4 mg (4 mg IntraVENous Given 3/6/22 0858)   famotidine (PF) (PEPCID) 20 mg in 0.9% sodium chloride 10 mL injection (20 mg IntraVENous Given 3/6/22 0858)   sodium chloride 0.9 % bolus infusion 1,000 mL (1,000 mL IntraVENous New Bag 3/6/22 0859)     EKG: prelim read  Rhythm: normal sinus rhythm; and regular . Rate (approx.): 98. Axis: normal;  SC interval: normal;  QRS interval: normal ;  ST/T wave: normal;  Other findings: normal.    ED Course and Reassessment     ED Course:     ED Course as of 03/06/22 1047   Sun Mar 06, 2022   0855 No significant electrolyte derangements. Creatinine elevated, CORY noted. No significant transaminitis noted. Normal bilirubin. CBC does not show any evidence of acute process. Leukocytosis not present to suggest infection. Hemoglobin not suggestive of acute anemia. Platelet count is normal.    Trop 7, will trend. [SS]   0408 Chest x-ray negative for acute pathology: no concern for pulmonary edema, pleural effusion, pneumothorax, or pneumonia. [SS]   1046 Second troponin with negative delta change. ACS ruled out. [SS]      ED Course User Index  [SS] Skinny Wheeler MD       Reassessment:    The patient presents with nausea and vomiting.  Based on the patient's clinical picture at this time and the evaluation as detailed above, I see no evidence for more malignant underlying processes. There is no significant evidence for significant dehydration requiring admission. Nausea and vomiting have improved during ED stay and patient tolerated PO intake. The possibility of more malignant occult pathology was discussed with the patient. The patient understands the need to return promptly with any worsening symptoms or changes, and that no emergency department workup, no matter how extensive, can definitely exclude some more serious intra-abdominal and pelvic processes early in the disease course. After completion of workup and discussion of results and diagnoses with the patient, all the patient's questions were answered. If required, all follow up appointments and treatments were discussed and explained. The patient sounded understanding and agrees with the plan. The patient understands that at this time there is no evidence for a more malignant underlying process, but the patient also understands that early in the process of an illness, an emergency department workup can be falsely reassuring. Routine discharge counseling was given to the patient and the patient understands that worsening, changing or persistent symptoms should prompt an immediate call or follow up with their primary physician or the emergency department. The importance of appropriate follow up was also discussed with the patient. More extensive discharge instructions were given in the patient's discharge paperwork. Final Disposition     DISCHARGED FROM EMERGENCY DEPARTMENT    Patient will be discharged from the Emergency Department in stable condition. All of the diagnostic tests were reviewed and any questions were answered. Diagnosis, results, follow up if applicable, and return precautions were discussed.  I have also put together printed discharge instructions for them that include: 1) educational information regarding their diagnosis, 2) how to care for their diagnosis at home, as well a 3) list of reasons why they would want to return to the ED prior to their follow-up appointment, should their condition change. Any labs or imaging done in the ED will be either printed with the discharge paperwork or available through [address] DISCHARGE PLAN:  1. Current Discharge Medication List      CONTINUE these medications which have NOT CHANGED    Details   insulin glargine (LANTUS,BASAGLAR) 100 unit/mL (3 mL) inpn Inject 10 units at bedtime  Qty: 1 Adjustable Dose Pre-filled Pen Syringe, Refills: 3    Associated Diagnoses: Type 2 diabetes mellitus with hyperglycemia, with long-term current use of insulin ([facility])      Insulin Needles, Disposable, 31 gauge x 5/16\" ndle Once a day  Qty: 1 Each, Refills: 3    Associated Diagnoses: Type 2 diabetes mellitus with hyperglycemia, with long-term current use of insulin ([address]      ! ! glucose blood VI test strips (OneTouch Verio test strips) strip Check glucose twice a day  Qty: 100 Strip, Refills: 5    Associated Diagnoses: Type 2 diabetes mellitus with hyperglycemia, with long-term current use of insulin ([address]      ! ! Blood-Glucose Meter (OneTouch Verio IQ Meter) monitoring kit Check glucose twice a day  Qty: 1 Kit, Refills: 0    Associated Diagnoses: Type 2 diabetes mellitus with hyperglycemia, with long-term current use of insulin ([facility])      lancets (One Touch Delica) 33 gauge misc Check glucose twice a day  Qty: 100 Each, Refills: 5    Associated Diagnoses: Type 2 diabetes mellitus with hyperglycemia, with long-term current use of insulin ([facility])      polyethylene glycol (Miralax) 17 gram/dose powder Take 17 g by mouth daily.  1 tablespoon with 8 oz of water daily  Qty: 235 g, Refills: 0      Blood-Glucose Meter (True Metrix Glucose Meter) misc Check glucose once a day  Qty: 1 Each, Refills: 0    Associated Diagnoses: Type 2 diabetes mellitus with hyperglycemia, without long-term current use of insulin ([address]      ! ! glucose blood VI test strips (True Metrix Glucose Test Strip) strip Check glucose once a day  Qty: 50 Strip, Refills: 5    Associated Diagnoses: Type 2 diabetes mellitus with hyperglycemia, without long-term current use of insulin ([address]      ! ! lancets misc Check glucose once a day  Qty: 50 Each, Refills: 5    Associated Diagnoses: Type 2 diabetes mellitus with hyperglycemia, without long-term current use of insulin ([address]      ! ! Blood-Glucose Meter monitoring kit Check once a day  Qty: 1 Kit, Refills: 0    Associated Diagnoses: Type 2 diabetes mellitus with hyperglycemia, without long-term current use of insulin ([address]      ! ! lancets misc Check once a day  Qty: 50 Each, Refills: 5    Associated Diagnoses: Type 2 diabetes mellitus with hyperglycemia, without long-term current use of insulin ([facility])      butalbital-acetaminophen-caff (Fioricet) -40 mg per capsule Take 1 Cap by mouth every four (4) hours as needed for Headache for up to 10 doses. Qty: 10 Cap, Refills: 0       !! - Potential duplicate medications found. Please discuss with provider. 2.   Follow-up Information     Follow up With Specialties Details Why [address] [place] [address] EMERGENCY DEPT Emergency Medicine Go to  If symptoms worsen [facility] [place] [address]  [phone]    Your doctor  Schedule an appointment as soon as possible for a visit in 3 days          3. Return to ED if worse    4. Current Discharge Medication List      START taking these medications    Details   ondansetron hcl (Zofran) 4 mg tablet Take 1 Tablet by mouth every eight (8) hours as needed for Nausea or Vomiting. Qty: 20 Tablet, Refills: 0  Start date: 3/6/2022      famotidine (Pepcid) 20 mg tablet Take 1 Tablet by mouth two (2) times a day for 10 days. Qty: 20 Tablet, Refills: 0  Start date: 3/6/2022, End date: 3/16/2022             Diagnosis     Clinical Impression:   1.  CORY (acute kidney injury) ([address] 2. Dehydration    3. Gastritis and duodenitis      Attestations:    Stevenson Fuentes MD    Please note that this dictation was completed with [employer], the computer voice recognition software. Quite often unanticipated grammatical, syntax, homophones, and other interpretive errors are inadvertently transcribed by the computer software. Please disregard these errors. Please excuse any errors that have escaped final proofreading. Thank you.

## 2022-03-06 NOTE — ED TRIAGE NOTES
Reports chest pressure starting at 1100 yesterday, reports dizziness. Pt nauseated and vomiting since yesterday.

## 2022-03-06 NOTE — DISCHARGE INSTRUCTIONS
Thank you! Thank you for allowing me to care for you in the emergency department. I sincerely hope that you are satisfied with your visit today. It is my goal to provide you with excellent care. Below you will find a list of your labs and imaging from your visit today. Should you have any questions regarding these results please do not hesitate to call the emergency department. Labs -     Recent Results (from the past 12 hour(s))   CBC WITH AUTOMATED DIFF    Collection Time: 03/06/22  8:14 AM   Result Value Ref Range    WBC 6.8 3.6 - 11.0 K/uL    RBC 5.28 (H) 3.80 - 5.20 M/uL    HGB 15.6 11.5 - 16.0 g/dL    HCT 44.4 35.0 - 47.0 %    MCV 84.1 80.0 - 99.0 FL    MCH 29.5 26.0 - 34.0 PG    MCHC 35.1 30.0 - 36.5 g/dL    RDW 13.4 11.5 - 14.5 %    PLATELET 896 (H) 137 - 400 K/uL    MPV 9.6 8.9 - 12.9 FL    NRBC 0.0 0.0  WBC    ABSOLUTE NRBC 0.00 0.00 - 0.01 K/uL    NEUTROPHILS 69 32 - 75 %    LYMPHOCYTES 22 12 - 49 %    MONOCYTES 9 5 - 13 %    EOSINOPHILS 0 0 - 7 %    BASOPHILS 0 0 - 1 %    IMMATURE GRANULOCYTES 0 0 - 0.5 %    ABS. NEUTROPHILS 4.6 1.8 - 8.0 K/UL    ABS. LYMPHOCYTES 1.5 0.8 - 3.5 K/UL    ABS. MONOCYTES 0.6 0.0 - 1.0 K/UL    ABS. EOSINOPHILS 0.0 0.0 - 0.4 K/UL    ABS. BASOPHILS 0.0 0.0 - 0.1 K/UL    ABS. IMM.  GRANS. 0.0 0.00 - 0.04 K/UL    DF AUTOMATED     METABOLIC PANEL, COMPREHENSIVE    Collection Time: 03/06/22  8:14 AM   Result Value Ref Range    Sodium 132 (L) 136 - 145 mmol/L    Potassium Hemolyzed, recollect requested 3.5 - 5.1 mmol/L    Chloride 95 (L) 97 - 108 mmol/L    CO2 27 21 - 32 mmol/L    Anion gap 10 5 - 15 mmol/L    Glucose 259 (H) 65 - 100 mg/dL    BUN 34 (H) 6 - 20 mg/dL    Creatinine 1.31 (H) 0.55 - 1.02 mg/dL    BUN/Creatinine ratio 26 (H) 12 - 20      GFR est AA 54 (L) >60 ml/min/1.73m2    GFR est non-AA 45 (L) >60 ml/min/1.73m2    Calcium 9.8 8.5 - 10.1 mg/dL    Bilirubin, total 1.0 0.2 - 1.0 mg/dL    AST (SGOT) Hemolyzed, recollect requested 15 - 37 U/L    ALT (SGPT) 31 12 - 78 U/L    Alk. phosphatase 76 45 - 117 U/L    Protein, total 9.6 (H) 6.4 - 8.2 g/dL    Albumin 4.8 3.5 - 5.0 g/dL    Globulin 4.8 (H) 2.0 - 4.0 g/dL    A-G Ratio 1.0 (L) 1.1 - 2.2     TROPONIN-HIGH SENSITIVITY    Collection Time: 03/06/22  8:14 AM   Result Value Ref Range    Troponin-High Sensitivity 7 0 - 51 ng/L   TROPONIN-HIGH SENSITIVITY    Collection Time: 03/06/22 10:11 AM   Result Value Ref Range    Troponin-High Sensitivity 9 0 - 51 ng/L       Radiologic Studies -   XR CHEST PORT   Final Result   Negative. CT Results  (Last 48 hours)      None          CXR Results  (Last 48 hours)                 03/06/22 0825  XR CHEST PORT Final result    Impression:  Negative. Narrative:  AP upright view of the chest compared to prior exam dated 12/27/2021. Heart size   is normal. Lungs are clear of infiltrate. No pulmonary nodule or pleural   effusion is seen. No bony abnormalities are identified. If you feel that you have not received excellent quality care or timely care, please ask to speak to the nurse manager. Please choose us in the future for your continued health care needs. ------------------------------------------------------------------------------------------------------------  The exam and treatment you received in the Emergency Department were for an urgent problem and are not intended as complete care. It is important that you follow-up with a doctor, nurse practitioner, or physician assistant to:  (1) confirm your diagnosis,  (2) re-evaluation of changes in your illness and treatment, and  (3) for ongoing care. If your symptoms become worse or you do not improve as expected and you are unable to reach your usual health care provider, you should return to the Emergency Department. We are available 24 hours a day. Please take your discharge instructions with you when you go to your follow-up appointment.      If a prescription has been provided, please have it filled as soon as possible to prevent a delay in treatment. Read the entire medication instruction sheet provided to you by the pharmacy. If you have any questions or reservations about taking the medication due to side effects or interactions with other medications, please call your primary care physician or contact the ER to speak with the charge nurse. Make an appointment with your family doctor or the physician you were referred to for follow-up of this visit as instructed on your discharge paperwork, as this is a mandatory follow-up. Return to the ER if you are unable to be seen or if you are unable to be seen in a timely manner. If you have any problem arranging the follow-up visit, contact the Emergency Department immediately.

## 2022-03-18 PROBLEM — K59.00 CONSTIPATION: Status: ACTIVE | Noted: 2020-11-14

## 2022-03-19 PROBLEM — N39.0 UTI (URINARY TRACT INFECTION): Status: ACTIVE | Noted: 2020-11-16

## 2022-03-19 PROBLEM — F31.9 BIPOLAR DISORDER (HCC): Status: ACTIVE | Noted: 2021-03-09

## 2022-03-20 PROBLEM — E55.9 VITAMIN D DEFICIENCY: Status: ACTIVE | Noted: 2020-11-16

## 2022-05-13 ENCOUNTER — HOSPITAL ENCOUNTER (EMERGENCY)
Age: 42
Discharge: HOME | End: 2022-05-13
Attending: EMERGENCY MEDICINE
Payer: MEDICAID

## 2022-05-13 ENCOUNTER — APPOINTMENT (OUTPATIENT)
Dept: GENERAL RADIOLOGY | Age: 42
End: 2022-05-13
Attending: PHYSICIAN ASSISTANT
Payer: MEDICAID

## 2022-05-13 ENCOUNTER — APPOINTMENT (OUTPATIENT)
Dept: CT IMAGING | Age: 42
End: 2022-05-13
Attending: PHYSICIAN ASSISTANT
Payer: MEDICAID

## 2022-05-13 VITALS
SYSTOLIC BLOOD PRESSURE: 146 MMHG | HEART RATE: 86 BPM | DIASTOLIC BLOOD PRESSURE: 97 MMHG | TEMPERATURE: 98.2 F | RESPIRATION RATE: 19 BRPM | OXYGEN SATURATION: 100 % | HEIGHT: 67 IN | WEIGHT: 120 LBS | BODY MASS INDEX: 18.83 KG/M2

## 2022-05-13 DIAGNOSIS — R07.89 CHEST PRESSURE: ICD-10-CM

## 2022-05-13 DIAGNOSIS — R11.2 NAUSEA AND VOMITING, UNSPECIFIED VOMITING TYPE: Primary | ICD-10-CM

## 2022-05-13 DIAGNOSIS — F32.A DEPRESSION, UNSPECIFIED DEPRESSION TYPE: ICD-10-CM

## 2022-05-13 LAB
ALBUMIN SERPL-MCNC: 4 G/DL (ref 3.5–5)
ALBUMIN/GLOB SERPL: 1.1 {RATIO} (ref 1.1–2.2)
ALP SERPL-CCNC: 56 U/L (ref 45–117)
ALT SERPL-CCNC: 19 U/L (ref 12–78)
ANION GAP SERPL CALC-SCNC: 8 MMOL/L (ref 5–15)
AST SERPL W P-5'-P-CCNC: 11 U/L (ref 15–37)
ATRIAL RATE: 86 BPM
BASOPHILS # BLD: 0 K/UL (ref 0–0.1)
BASOPHILS NFR BLD: 0 % (ref 0–1)
BILIRUB SERPL-MCNC: 0.8 MG/DL (ref 0.2–1)
BNP SERPL-MCNC: 137 PG/ML
BUN SERPL-MCNC: 14 MG/DL (ref 6–20)
BUN/CREAT SERPL: 15 (ref 12–20)
CA-I BLD-MCNC: 9.5 MG/DL (ref 8.5–10.1)
CALCULATED P AXIS, ECG09: 67 DEGREES
CALCULATED R AXIS, ECG10: 93 DEGREES
CALCULATED T AXIS, ECG11: 69 DEGREES
CHLORIDE SERPL-SCNC: 98 MMOL/L (ref 97–108)
CO2 SERPL-SCNC: 30 MMOL/L (ref 21–32)
CREAT SERPL-MCNC: 0.91 MG/DL (ref 0.55–1.02)
DIAGNOSIS, 93000: NORMAL
DIFFERENTIAL METHOD BLD: NORMAL
EOSINOPHIL # BLD: 0 K/UL (ref 0–0.4)
EOSINOPHIL NFR BLD: 1 % (ref 0–7)
ERYTHROCYTE [DISTWIDTH] IN BLOOD BY AUTOMATED COUNT: 12.9 % (ref 11.5–14.5)
GLOBULIN SER CALC-MCNC: 3.6 G/DL (ref 2–4)
GLUCOSE SERPL-MCNC: 246 MG/DL (ref 65–100)
HCT VFR BLD AUTO: 38.6 % (ref 35–47)
HGB BLD-MCNC: 13.4 G/DL (ref 11.5–16)
IMM GRANULOCYTES # BLD AUTO: 0 K/UL (ref 0–0.04)
IMM GRANULOCYTES NFR BLD AUTO: 0 % (ref 0–0.5)
LIPASE SERPL-CCNC: 242 U/L (ref 73–393)
LYMPHOCYTES # BLD: 1.4 K/UL (ref 0.8–3.5)
LYMPHOCYTES NFR BLD: 28 % (ref 12–49)
MCH RBC QN AUTO: 30 PG (ref 26–34)
MCHC RBC AUTO-ENTMCNC: 34.7 G/DL (ref 30–36.5)
MCV RBC AUTO: 86.5 FL (ref 80–99)
MONOCYTES # BLD: 0.6 K/UL (ref 0–1)
MONOCYTES NFR BLD: 12 % (ref 5–13)
NEUTS SEG # BLD: 2.9 K/UL (ref 1.8–8)
NEUTS SEG NFR BLD: 59 % (ref 32–75)
NRBC # BLD: 0 K/UL (ref 0–0.01)
NRBC BLD-RTO: 0 PER 100 WBC
P-R INTERVAL, ECG05: 134 MS
PLATELET # BLD AUTO: 367 K/UL (ref 150–400)
PMV BLD AUTO: 9.4 FL (ref 8.9–12.9)
POTASSIUM SERPL-SCNC: 3 MMOL/L (ref 3.5–5.1)
PROT SERPL-MCNC: 7.6 G/DL (ref 6.4–8.2)
Q-T INTERVAL, ECG07: 378 MS
QRS DURATION, ECG06: 80 MS
QTC CALCULATION (BEZET), ECG08: 452 MS
RBC # BLD AUTO: 4.46 M/UL (ref 3.8–5.2)
SODIUM SERPL-SCNC: 136 MMOL/L (ref 136–145)
TROPONIN-HIGH SENSITIVITY: 10 NG/L (ref 0–51)
TROPONIN-HIGH SENSITIVITY: 9 NG/L (ref 0–51)
VENTRICULAR RATE, ECG03: 86 BPM
WBC # BLD AUTO: 4.9 K/UL (ref 3.6–11)

## 2022-05-13 PROCEDURE — 74011000636 HC RX REV CODE- 636: Performed by: EMERGENCY MEDICINE

## 2022-05-13 PROCEDURE — 74177 CT ABD & PELVIS W/CONTRAST: CPT

## 2022-05-13 PROCEDURE — 80053 COMPREHEN METABOLIC PANEL: CPT

## 2022-05-13 PROCEDURE — 74011250637 HC RX REV CODE- 250/637: Performed by: PHYSICIAN ASSISTANT

## 2022-05-13 PROCEDURE — 83880 ASSAY OF NATRIURETIC PEPTIDE: CPT

## 2022-05-13 PROCEDURE — 85025 COMPLETE CBC W/AUTO DIFF WBC: CPT

## 2022-05-13 PROCEDURE — 74011250636 HC RX REV CODE- 250/636: Performed by: PHYSICIAN ASSISTANT

## 2022-05-13 PROCEDURE — 36415 COLL VENOUS BLD VENIPUNCTURE: CPT

## 2022-05-13 PROCEDURE — 99285 EMERGENCY DEPT VISIT HI MDM: CPT

## 2022-05-13 PROCEDURE — 83690 ASSAY OF LIPASE: CPT

## 2022-05-13 PROCEDURE — 84484 ASSAY OF TROPONIN QUANT: CPT

## 2022-05-13 PROCEDURE — 93005 ELECTROCARDIOGRAM TRACING: CPT

## 2022-05-13 PROCEDURE — 71045 X-RAY EXAM CHEST 1 VIEW: CPT

## 2022-05-13 PROCEDURE — 96374 THER/PROPH/DIAG INJ IV PUSH: CPT

## 2022-05-13 RX ORDER — ASPIRIN 325 MG
325 TABLET ORAL
Status: COMPLETED | OUTPATIENT
Start: 2022-05-13 | End: 2022-05-13

## 2022-05-13 RX ORDER — ONDANSETRON 4 MG/1
4 TABLET, ORALLY DISINTEGRATING ORAL
Qty: 10 TABLET | Refills: 0 | Status: SHIPPED | OUTPATIENT
Start: 2022-05-13 | End: 2022-05-14

## 2022-05-13 RX ORDER — POTASSIUM CHLORIDE 750 MG/1
20 TABLET, FILM COATED, EXTENDED RELEASE ORAL
Status: COMPLETED | OUTPATIENT
Start: 2022-05-13 | End: 2022-05-13

## 2022-05-13 RX ORDER — ONDANSETRON 2 MG/ML
4 INJECTION INTRAMUSCULAR; INTRAVENOUS
Status: COMPLETED | OUTPATIENT
Start: 2022-05-13 | End: 2022-05-13

## 2022-05-13 RX ADMIN — ONDANSETRON HYDROCHLORIDE 4 MG: 2 SOLUTION INTRAMUSCULAR; INTRAVENOUS at 10:29

## 2022-05-13 RX ADMIN — POTASSIUM CHLORIDE 20 MEQ: 750 TABLET, FILM COATED, EXTENDED RELEASE ORAL at 12:10

## 2022-05-13 RX ADMIN — ASPIRIN 325 MG ORAL TABLET 325 MG: 325 PILL ORAL at 12:10

## 2022-05-13 RX ADMIN — IOPAMIDOL 100 ML: 755 INJECTION, SOLUTION INTRAVENOUS at 12:10

## 2022-05-13 NOTE — ED PROVIDER NOTES
EMERGENCY DEPARTMENT HISTORY AND PHYSICAL EXAM      Date: 5/13/2022  Patient Name: Osiel Vasquez    History of Presenting Illness     Chief Complaint   Patient presents with    Chest Pain    Nausea       History Provided By: Patient    HPI: Osiel Vasquez, 39 y.o. female with a past medical history significant diabetes and hypertension presents to the ED with cc of chest pressure onset this morning. Patient also is concerned about nausea and vomiting. She reports 2 episodes since she ate a hamburger last night. She denies any blood in her vomit. Patient received Zofran in route by EMS. Patient specifically denies fever, abdominal pain, diarrhea. Patient also reports a lack of appetite over the last several weeks with an unintentional weight loss. Patient also admits to feeling down and depressed due to an unstable relationship with an ex-partner. Patient reports that she is in a safe living situation and she denies any suicidal ideation at this time. Patient admits to marijuana use. Denies alcohol use. 41-year-old female presenting with a chief complaint of chest pressure as well as nausea and vomiting x2    There are no other complaints, changes, or physical findings at this time. PCP: Cecilia Valadez MD    Current Outpatient Medications   Medication Sig Dispense Refill    ondansetron hcl (Zofran) 4 mg tablet Take 1 Tablet by mouth every eight (8) hours as needed for Nausea.  20 Tablet 0    insulin glargine (LANTUS,BASAGLAR) 100 unit/mL (3 mL) inpn Inject 10 units at bedtime 1 Adjustable Dose Pre-filled Pen Syringe 3    Insulin Needles, Disposable, 31 gauge x 5/16\" ndle Once a day 1 Each 3    glucose blood VI test strips (OneTouch Verio test strips) strip Check glucose twice a day 100 Strip 5    Blood-Glucose Meter (OneTouch Verio IQ Meter) monitoring kit Check glucose twice a day 1 Kit 0    lancets (One Touch Delica) 33 gauge misc Check glucose twice a day 100 Each 5    polyethylene glycol (Miralax) 17 gram/dose powder Take 17 g by mouth daily. 1 tablespoon with 8 oz of water daily 235 g 0    Blood-Glucose Meter (True Metrix Glucose Meter) misc Check glucose once a day 1 Each 0    glucose blood VI test strips (True Metrix Glucose Test Strip) strip Check glucose once a day 50 Strip 5    lancets misc Check glucose once a day 50 Each 5    Blood-Glucose Meter monitoring kit Check once a day 1 Kit 0    lancets misc Check once a day 50 Each 5    butalbital-acetaminophen-caff (Fioricet) -40 mg per capsule Take 1 Cap by mouth every four (4) hours as needed for Headache for up to 10 doses. 10 Cap 0       Past History     Past Medical History:  Past Medical History:   Diagnosis Date    Cholesterol serum increased 5/19/2010    Diabetes mellitus ([address] 5/19/2010    HTN (hypertension), benign 5/19/2010    Vitamin D deficiency 11/16/2020       Past Surgical History:  Past Surgical History:   Procedure Laterality Date    HX GYN      s/p Hysterectomy       Family History:  Family History   Problem Relation Age of Onset    Diabetes Mother     Cancer Father     Alcohol abuse Neg Hx     Arthritis-rheumatoid Neg Hx     Asthma Neg Hx     Bleeding Prob Neg Hx     Elevated Lipids Neg Hx     Headache Neg Hx     Heart Disease Neg Hx     Hypertension Neg Hx     Lung Disease Neg Hx     Migraines Neg Hx     Psychiatric Disorder Neg Hx     Stroke Neg Hx     Mental Retardation Neg Hx        Social History:  Social History     Tobacco Use    Smoking status: Current Every Day Smoker     Packs/day: 1.00    Smokeless tobacco: Never Used   Vaping Use    Vaping Use: Never used   Substance Use Topics    Alcohol use: No    Drug use: No       Allergies:  No Known Allergies      Review of Systems   Review of Systems   Constitutional: Positive for appetite change and unexpected weight change. Negative for activity change, chills and fever.    HENT: Negative for congestion, ear pain, rhinorrhea, sneezing and sore throat. Eyes: Negative for pain and visual disturbance. Respiratory: Positive for chest tightness (pressure). Negative for cough and shortness of breath. Cardiovascular: Negative for chest pain. Gastrointestinal: Positive for nausea and vomiting. Negative for abdominal pain and diarrhea. Genitourinary: Negative for dysuria and hematuria. Musculoskeletal: Negative for gait problem. Skin: Negative for rash. Neurological: Negative for speech difficulty, weakness and headaches. Psychiatric/Behavioral: Positive for dysphoric mood. The patient is not nervous/anxious. All other systems reviewed and are negative. Physical Exam   Physical Exam  Vitals and nursing note reviewed. Constitutional:       General: She is not in acute distress. Appearance: Normal appearance. She is not ill-appearing or toxic-appearing. HENT:      Head: Normocephalic and atraumatic. Nose: Nose normal.      Mouth/Throat:      Mouth: Mucous membranes are dry. Pharynx: Oropharynx is clear. Eyes:      Extraocular Movements: Extraocular movements intact. Conjunctiva/sclera: Conjunctivae normal.      Pupils: Pupils are equal, round, and reactive to light. Cardiovascular:      Rate and Rhythm: Normal rate. Pulses: Normal pulses. Heart sounds: Normal heart sounds. Pulmonary:      Effort: Pulmonary effort is normal. No respiratory distress. Breath sounds: Normal breath sounds. Abdominal:      General: Bowel sounds are normal.      Palpations: Abdomen is soft. There is no mass. Tenderness: There is no abdominal tenderness. There is no guarding or rebound. Musculoskeletal:         General: No deformity or signs of injury. Normal range of motion. Cervical back: Normal range of motion. Skin:     General: Skin is warm and dry. Capillary Refill: Capillary refill takes less than 2 seconds. Findings: No rash.    Neurological:      General: No focal deficit present. Mental Status: She is alert and oriented to person, place, and time. Cranial Nerves: No cranial nerve deficit. Psychiatric:         Mood and Affect: Mood is depressed. Affect is tearful. Diagnostic Study Results     Labs -     Recent Results (from the past 200 hour(s))   LIPASE    Collection Time: 05/13/22 10:22 AM   Result Value Ref Range    Lipase 242 73 - 634 U/L   METABOLIC PANEL, COMPREHENSIVE    Collection Time: 05/13/22 10:22 AM   Result Value Ref Range    Sodium 136 136 - 145 mmol/L    Potassium 3.0 (L) 3.5 - 5.1 mmol/L    Chloride 98 97 - 108 mmol/L    CO2 30 21 - 32 mmol/L    Anion gap 8 5 - 15 mmol/L    Glucose 246 (H) 65 - 100 mg/dL    BUN 14 6 - 20 mg/dL    Creatinine 0.91 0.55 - 1.02 mg/dL    BUN/Creatinine ratio 15 12 - 20      GFR est AA >60 >60 ml/min/1.73m2    GFR est non-AA >60 >60 ml/min/1.73m2    Calcium 9.5 8.5 - 10.1 mg/dL    Bilirubin, total 0.8 0.2 - 1.0 mg/dL    AST (SGOT) 11 (L) 15 - 37 U/L    ALT (SGPT) 19 12 - 78 U/L    Alk.  phosphatase 56 45 - 117 U/L    Protein, total 7.6 6.4 - 8.2 g/dL    Albumin 4.0 3.5 - 5.0 g/dL    Globulin 3.6 2.0 - 4.0 g/dL    A-G Ratio 1.1 1.1 - 2.2     TROPONIN-HIGH SENSITIVITY    Collection Time: 05/13/22 10:22 AM   Result Value Ref Range    Troponin-High Sensitivity 9 0 - 51 ng/L   NT-PRO BNP    Collection Time: 05/13/22 10:22 AM   Result Value Ref Range    NT pro- (H) <125 pg/mL   CBC WITH AUTOMATED DIFF    Collection Time: 05/13/22 10:22 AM   Result Value Ref Range    WBC 4.9 3.6 - 11.0 K/uL    RBC 4.46 3.80 - 5.20 M/uL    HGB 13.4 11.5 - 16.0 g/dL    HCT 38.6 35.0 - 47.0 %    MCV 86.5 80.0 - 99.0 FL    MCH 30.0 26.0 - 34.0 PG    MCHC 34.7 30.0 - 36.5 g/dL    RDW 12.9 11.5 - 14.5 %    PLATELET 574 133 - 366 K/uL    MPV 9.4 8.9 - 12.9 FL    NRBC 0.0 0.0  WBC    ABSOLUTE NRBC 0.00 0.00 - 0.01 K/uL    NEUTROPHILS 59 32 - 75 %    LYMPHOCYTES 28 12 - 49 %    MONOCYTES 12 5 - 13 %    EOSINOPHILS 1 0 - 7 % BASOPHILS 0 0 - 1 %    IMMATURE GRANULOCYTES 0 0 - 0.5 %    ABS. NEUTROPHILS 2.9 1.8 - 8.0 K/UL    ABS. LYMPHOCYTES 1.4 0.8 - 3.5 K/UL    ABS. MONOCYTES 0.6 0.0 - 1.0 K/UL    ABS. EOSINOPHILS 0.0 0.0 - 0.4 K/UL    ABS. BASOPHILS 0.0 0.0 - 0.1 K/UL    ABS. IMM.  GRANS. 0.0 0.00 - 0.04 K/UL    DF AUTOMATED     EKG, 12 LEAD, INITIAL    Collection Time: 05/13/22 11:12 AM   Result Value Ref Range    Ventricular Rate 86 BPM    Atrial Rate 86 BPM    P-R Interval 134 ms    QRS Duration 80 ms    Q-T Interval 378 ms    QTC Calculation (Bezet) 452 ms    Calculated P Axis 67 degrees    Calculated R Axis 93 degrees    Calculated T Axis 69 degrees    Diagnosis       Normal sinus rhythm  Rightward axis  Borderline ECG  When compared with ECG of 06-MAR-2022 08:07,  No significant change was found  Confirmed by Malorie Perez ([id number]) on 5/13/2022 3:51:51 PM     TROPONIN-HIGH SENSITIVITY    Collection Time: 05/13/22 12:02 PM   Result Value Ref Range    Troponin-High Sensitivity 10 0 - 51 ng/L   EKG, 12 LEAD, INITIAL    Collection Time: 05/14/22  1:36 PM   Result Value Ref Range    Ventricular Rate 97 BPM    Atrial Rate 97 BPM    P-R Interval 134 ms    QRS Duration 78 ms    Q-T Interval 358 ms    QTC Calculation (Bezet) 454 ms    Calculated P Axis 82 degrees    Calculated R Axis 90 degrees    Calculated T Axis 69 degrees    Diagnosis       Normal sinus rhythm  Possible Left atrial enlargement  Rightward axis  Borderline ECG  When compared with ECG of 13-MAY-2022 11:12,  No significant change was found  Confirmed by Anand CHANG MD ([id number]) on 5/14/2022 11:20:57 PM     CBC WITH AUTOMATED DIFF    Collection Time: 05/14/22  1:44 PM   Result Value Ref Range    WBC 6.0 3.6 - 11.0 K/uL    RBC 4.56 3.80 - 5.20 M/uL    HGB 13.9 11.5 - 16.0 g/dL    HCT 39.0 35.0 - 47.0 %    MCV 85.5 80.0 - 99.0 FL    MCH 30.5 26.0 - 34.0 PG    MCHC 35.6 30.0 - 36.5 g/dL    RDW 12.4 11.5 - 14.5 %    PLATELET 815 529 - 504 K/uL    MPV 9.5 8.9 - 12.9 FL    NRBC 0.0 0. 0  WBC    ABSOLUTE NRBC 0.00 0.00 - 0.01 K/uL    NEUTROPHILS 71 32 - 75 %    LYMPHOCYTES 22 12 - 49 %    MONOCYTES 6 5 - 13 %    EOSINOPHILS 0 0 - 7 %    BASOPHILS 1 0 - 1 %    IMMATURE GRANULOCYTES 0 0 - 0.5 %    ABS. NEUTROPHILS 4.3 1.8 - 8.0 K/UL    ABS. LYMPHOCYTES 1.4 0.8 - 3.5 K/UL    ABS. MONOCYTES 0.4 0.0 - 1.0 K/UL    ABS. EOSINOPHILS 0.0 0.0 - 0.4 K/UL    ABS. BASOPHILS 0.0 0.0 - 0.1 K/UL    ABS. IMM. GRANS. 0.0 0.00 - 0.04 K/UL    DF AUTOMATED     METABOLIC PANEL, COMPREHENSIVE    Collection Time: 05/14/22  1:44 PM   Result Value Ref Range    Sodium 139 136 - 145 mmol/L    Potassium 3.3 (L) 3.5 - 5.1 mmol/L    Chloride 103 97 - 108 mmol/L    CO2 29 21 - 32 mmol/L    Anion gap 7 5 - 15 mmol/L    Glucose 173 (H) 65 - 100 mg/dL    BUN 11 6 - 20 mg/dL    Creatinine 0.59 0.55 - 1.02 mg/dL    BUN/Creatinine ratio 19 12 - 20      GFR est AA >60 >60 ml/min/1.73m2    GFR est non-AA >60 >60 ml/min/1.73m2    Calcium 8.7 8.5 - 10.1 mg/dL    Bilirubin, total 0.6 0.2 - 1.0 mg/dL    AST (SGOT) 14 (L) 15 - 37 U/L    ALT (SGPT) 18 12 - 78 U/L    Alk. phosphatase 54 45 - 117 U/L    Protein, total 7.1 6.4 - 8.2 g/dL    Albumin 3.8 3.5 - 5.0 g/dL    Globulin 3.3 2.0 - 4.0 g/dL    A-G Ratio 1.2 1.1 - 2.2     TROPONIN-HIGH SENSITIVITY    Collection Time: 05/14/22  1:44 PM   Result Value Ref Range    Troponin-High Sensitivity 7 0 - 51 ng/L       Radiologic Studies -   XR Results (most recent):  Results from Hospital Encounter encounter on 05/14/22    XR CHEST PORT    Narrative  History: Shortness of breath    A single portable view of  the chest was obtained. Heart size and vascularity  are within normal limits. Lungs are clear. Bony structures are within normal  limits. Impression  Normal findings.      CT Results  (Last 48 hours)    None        CT Results (most recent):  Results from Hospital Encounter encounter on 05/13/22    CT ABD PELV W CONT    Narrative  CT abdomen and pelvis with IV contrast.    Axial images are reviewed along with reformatted sagittal/coronal images. 100 mL  Isovue 370 administered. Dose reduction: All CT scans at this facility are performed using dose reduction  optimization techniques as appropriate to a performed exam including the  following-  automated exposure control, adjustments of mA and/or Kv according to patient  size, or use of iterative reconstructive technique. Lung bases are clear. Normal enhancement for the liver. Gallbladder nondistended. Pancreas, spleen,  and bilateral adrenal glands appear unremarkable. Normal enhancement bilateral  kidneys. No hydronephrosis. Stomach and small bowel loops are decompressed. Appendix unremarkable. Stool and  air through colon. No CT evidence for appendicitis or diverticulitis. No  ascites. Nonspecific 1.5 cm right ovarian cyst.    Normal caliber abdominal aorta. Impression  No bowel obstruction. No CT evidence for appendicitis or  diverticulitis. Nonspecific 1.5 cm right ovarian cyst.          Medical Decision Making and ED Course   I am the first provider for this patient. I reviewed the vital signs, available nursing notes, past medical history, past surgical history, family history and social history. Vital Signs-Reviewed the patient's vital signs. Wt Readings from Last 3 Encounters:   05/14/22 54.4 kg (120 lb)   05/13/22 54.4 kg (120 lb)   03/06/22 52.2 kg (115 lb)     Temp Readings from Last 3 Encounters:   05/14/22 99.3 °F (37.4 °C)   05/13/22 98.2 °F (36.8 °C)   03/06/22 98.2 °F (36.8 °C)     BP Readings from Last 3 Encounters:   05/14/22 (!) 183/99   05/13/22 (!) 146/97   03/06/22 127/88     Pulse Readings from Last 3 Encounters:   05/14/22 90   05/13/22 86   03/06/22 92       No data found.      Records Reviewed: Nursing Notes and Old Medical Records    Provider Notes (Medical Decision Making):       MDM  Number of Diagnoses or Management Options  Chest pressure  Depression, unspecified depression type  Nausea and vomiting, unspecified vomiting type  Diagnosis management comments: 44-year-old female presenting with a chief complaint of chest pressure as well as nausea and vomiting x2. She also reports a significant amount of weight loss in the last few weeks. She admits to feeling depressed due to an ex partner but does not have any SI. No acute psychosis. Her abdomen is soft and nontender. CT scan of abdomen and pelvis reveals no acute process, her cardiac work-up is negative. She is tolerating p.o. and is feeling better after IV fluids. I suspect her weight loss is secondary to a lack of appetite due to depression. She has been given resources by [employer] for outpatient therapy and voices understanding of the discharge plan. Amount and/or Complexity of Data Reviewed  Clinical lab tests: ordered and reviewed  Tests in the radiology section of CPT®: ordered and reviewed  Review and summarize past medical records: yes          ED Course:   Initial assessment performed. The patients presenting problems have been discussed, and they are in agreement with the care plan formulated and outlined with them. I have encouraged them to ask questions as they arise throughout their visit.    1:04 PM  Progress Note:  Patient was re-evaluated at bedside. Patient is feeling better, p.o. trial.              Procedures       DORIE Quinteros PA-C        Disposition     Disposition: DC- Adult Discharges: All of the diagnostic tests were reviewed and questions answered. Diagnosis, care plan and treatment options were discussed. The patient understands the instructions and will follow up as directed. The patients results have been reviewed with them. They have been counseled regarding their diagnosis.   The patient verbally convey understanding and agreement of the signs, symptoms, diagnosis, treatment and prognosis and additionally agrees to follow up as recommended with their PCP in 24 - 48 hours.  They also agree with the care-plan and convey that all of their questions have been answered. I have also put together some discharge instructions for them that include: 1) educational information regarding their diagnosis, 2) how to care for their diagnosis at home, as well a 3) list of reasons why they would want to return to the ED prior to their follow-up appointment, should their condition change. Discharged    DISCHARGE PLAN:  1. Current Discharge Medication List      CONTINUE these medications which have NOT CHANGED    Details   ondansetron hcl (Zofran) 4 mg tablet Take 1 Tablet by mouth every eight (8) hours as needed for Nausea or Vomiting. Qty: 20 Tablet, Refills: 0      insulin glargine (LANTUS,BASAGLAR) 100 unit/mL (3 mL) inpn Inject 10 units at bedtime  Qty: 1 Adjustable Dose Pre-filled Pen Syringe, Refills: 3    Associated Diagnoses: Type 2 diabetes mellitus with hyperglycemia, with long-term current use of insulin ([facility])      Insulin Needles, Disposable, 31 gauge x 5/16\" ndle Once a day  Qty: 1 Each, Refills: 3    Associated Diagnoses: Type 2 diabetes mellitus with hyperglycemia, with long-term current use of insulin ([address]      ! ! glucose blood VI test strips (OneTouch Verio test strips) strip Check glucose twice a day  Qty: 100 Strip, Refills: 5    Associated Diagnoses: Type 2 diabetes mellitus with hyperglycemia, with long-term current use of insulin ([address]      ! ! Blood-Glucose Meter (OneTouch Verio IQ Meter) monitoring kit Check glucose twice a day  Qty: 1 Kit, Refills: 0    Associated Diagnoses: Type 2 diabetes mellitus with hyperglycemia, with long-term current use of insulin ([facility])      lancets (One Touch Delica) 33 gauge misc Check glucose twice a day  Qty: 100 Each, Refills: 5    Associated Diagnoses: Type 2 diabetes mellitus with hyperglycemia, with long-term current use of insulin ([facility])      polyethylene glycol (Miralax) 17 gram/dose powder Take 17 g by mouth daily.  1 tablespoon with 8 oz of water daily  Qty: 235 g, Refills: 0      Blood-Glucose Meter (True Metrix Glucose Meter) misc Check glucose once a day  Qty: 1 Each, Refills: 0    Associated Diagnoses: Type 2 diabetes mellitus with hyperglycemia, without long-term current use of insulin ([address]      ! ! glucose blood VI test strips (True Metrix Glucose Test Strip) strip Check glucose once a day  Qty: 50 Strip, Refills: 5    Associated Diagnoses: Type 2 diabetes mellitus with hyperglycemia, without long-term current use of insulin ([address]      ! ! lancets misc Check glucose once a day  Qty: 50 Each, Refills: 5    Associated Diagnoses: Type 2 diabetes mellitus with hyperglycemia, without long-term current use of insulin ([address]      ! ! Blood-Glucose Meter monitoring kit Check once a day  Qty: 1 Kit, Refills: 0    Associated Diagnoses: Type 2 diabetes mellitus with hyperglycemia, without long-term current use of insulin ([address]      ! ! lancets misc Check once a day  Qty: 50 Each, Refills: 5    Associated Diagnoses: Type 2 diabetes mellitus with hyperglycemia, without long-term current use of insulin ([facility])      butalbital-acetaminophen-caff (Fioricet) -40 mg per capsule Take 1 Cap by mouth every four (4) hours as needed for Headache for up to 10 doses. Qty: 10 Cap, Refills: 0       !! - Potential duplicate medications found. Please discuss with provider. 2.   Follow-up Information     Follow up With Specialties Details Why [address]  Schedule an appointment as soon as possible for a visit  for follow up from ER visit [address]  Call  for follow up Address: [facility]  Hours: Closed · Sandra Lynch  Phone: [phone]    [facility] EMERGENCY DEPT Emergency Medicine  As needed, If symptoms worsen [address] [id number] [phone]        3. Return to ED if worse   4.    Discharge Medication List as of 5/13/2022  1:55 PM      START taking these medications    Details   ondansetron (ZOFRAN ODT) 4 mg disintegrating tablet Take 1 Tablet by mouth every eight (8) hours as needed for Nausea or Vomiting., Normal, Disp-10 Tablet, R-0         CONTINUE these medications which have NOT CHANGED    Details   ondansetron hcl (Zofran) 4 mg tablet Take 1 Tablet by mouth every eight (8) hours as needed for Nausea or Vomiting., Normal, Disp-20 Tablet, R-0      insulin glargine (LANTUS,BASAGLAR) 100 unit/mL (3 mL) inpn Inject 10 units at bedtime, Normal, Disp-1 Adjustable Dose Pre-filled Pen Syringe, R-3      Insulin Needles, Disposable, 31 gauge x 5/16\" ndle Once a day, Normal, Disp-1 Each, R-3      !! glucose blood VI test strips (OneTouch Verio test strips) strip Check glucose twice a day, Normal, Disp-100 Strip, R-5      !! Blood-Glucose Meter (OneTouch Verio IQ Meter) monitoring kit Check glucose twice a day, Normal, Disp-1 Kit, R-0      lancets (One Touch Delica) 33 gauge misc Check glucose twice a day, Normal, Disp-100 Each, R-5      polyethylene glycol (Miralax) 17 gram/dose powder Take 17 g by mouth daily. 1 tablespoon with 8 oz of water daily, Normal, Disp-235 g, R-0      Blood-Glucose Meter (True Metrix Glucose Meter) misc Check glucose once a day, Normal, Disp-1 Each, R-0      !! glucose blood VI test strips (True Metrix Glucose Test Strip) strip Check glucose once a day, Normal, Disp-50 Strip, R-5      !! lancets misc Check glucose once a day, Normal, Disp-50 Each, R-5      !! Blood-Glucose Meter monitoring kit Check once a day, Normal, Disp-1 Kit, R-0      !! lancets misc Check once a day, Normal, Disp-50 Each, R-5      butalbital-acetaminophen-caff (Fioricet) -40 mg per capsule Take 1 Cap by mouth every four (4) hours as needed for Headache for up to 10 doses. , Normal, Disp-10 Cap, R-0       !! - Potential duplicate medications found. Please discuss with provider.           Diagnosis Clinical Impression:   1. Nausea and vomiting, unspecified vomiting type    2. Depression, unspecified depression type    3. Chest pressure        Attestations:    Lucas Mcleod PA-C    Please note that this dictation was completed with [employer], the computer voice recognition software. Quite often unanticipated grammatical, syntax, homophones, and other interpretive errors are inadvertently transcribed by the computer software. Please disregard these errors. Please excuse any errors that have escaped final proofreading. Thank you.

## 2022-05-13 NOTE — Clinical Note
[facility] EMERGENCY DEPT  [address] [place] [id number]  [phone]    Work/School Note    Date: 5/13/2022    To Whom It May concern:    Marguerite King was seen and treated today in the emergency room by the following provider(s):  Attending Provider: Valorie Miller MD  Physician Assistant: Marianna Chand PA-C. Marguerite King is excused from work/school on 05/13/22 and 05/14/22. She is medically clear to return to work/school on 5/15/2022.        Sincerely,          Francine Avila PA-C

## 2022-05-14 ENCOUNTER — APPOINTMENT (OUTPATIENT)
Dept: GENERAL RADIOLOGY | Age: 42
End: 2022-05-14
Attending: EMERGENCY MEDICINE
Payer: MEDICAID

## 2022-05-14 ENCOUNTER — HOSPITAL ENCOUNTER (EMERGENCY)
Age: 42
Discharge: HOME | End: 2022-05-14
Attending: EMERGENCY MEDICINE
Payer: MEDICAID

## 2022-05-14 VITALS
WEIGHT: 120 LBS | SYSTOLIC BLOOD PRESSURE: 183 MMHG | TEMPERATURE: 99.3 F | OXYGEN SATURATION: 100 % | RESPIRATION RATE: 18 BRPM | HEART RATE: 90 BPM | HEIGHT: 66 IN | DIASTOLIC BLOOD PRESSURE: 99 MMHG | BODY MASS INDEX: 19.29 KG/M2

## 2022-05-14 DIAGNOSIS — F12.188 CANNABIS HYPEREMESIS SYNDROME CONCURRENT WITH AND DUE TO CANNABIS ABUSE (HCC): Primary | ICD-10-CM

## 2022-05-14 LAB
ALBUMIN SERPL-MCNC: 3.8 G/DL (ref 3.5–5)
ALBUMIN/GLOB SERPL: 1.2 {RATIO} (ref 1.1–2.2)
ALP SERPL-CCNC: 54 U/L (ref 45–117)
ALT SERPL-CCNC: 18 U/L (ref 12–78)
ANION GAP SERPL CALC-SCNC: 7 MMOL/L (ref 5–15)
AST SERPL W P-5'-P-CCNC: 14 U/L (ref 15–37)
ATRIAL RATE: 97 BPM
BASOPHILS # BLD: 0 K/UL (ref 0–0.1)
BASOPHILS NFR BLD: 1 % (ref 0–1)
BILIRUB SERPL-MCNC: 0.6 MG/DL (ref 0.2–1)
BUN SERPL-MCNC: 11 MG/DL (ref 6–20)
BUN/CREAT SERPL: 19 (ref 12–20)
CA-I BLD-MCNC: 8.7 MG/DL (ref 8.5–10.1)
CALCULATED P AXIS, ECG09: 82 DEGREES
CALCULATED R AXIS, ECG10: 90 DEGREES
CALCULATED T AXIS, ECG11: 69 DEGREES
CHLORIDE SERPL-SCNC: 103 MMOL/L (ref 97–108)
CO2 SERPL-SCNC: 29 MMOL/L (ref 21–32)
CREAT SERPL-MCNC: 0.59 MG/DL (ref 0.55–1.02)
DIAGNOSIS, 93000: NORMAL
DIFFERENTIAL METHOD BLD: NORMAL
EOSINOPHIL # BLD: 0 K/UL (ref 0–0.4)
EOSINOPHIL NFR BLD: 0 % (ref 0–7)
ERYTHROCYTE [DISTWIDTH] IN BLOOD BY AUTOMATED COUNT: 12.4 % (ref 11.5–14.5)
GLOBULIN SER CALC-MCNC: 3.3 G/DL (ref 2–4)
GLUCOSE SERPL-MCNC: 173 MG/DL (ref 65–100)
HCT VFR BLD AUTO: 39 % (ref 35–47)
HGB BLD-MCNC: 13.9 G/DL (ref 11.5–16)
IMM GRANULOCYTES # BLD AUTO: 0 K/UL (ref 0–0.04)
IMM GRANULOCYTES NFR BLD AUTO: 0 % (ref 0–0.5)
LYMPHOCYTES # BLD: 1.4 K/UL (ref 0.8–3.5)
LYMPHOCYTES NFR BLD: 22 % (ref 12–49)
MCH RBC QN AUTO: 30.5 PG (ref 26–34)
MCHC RBC AUTO-ENTMCNC: 35.6 G/DL (ref 30–36.5)
MCV RBC AUTO: 85.5 FL (ref 80–99)
MONOCYTES # BLD: 0.4 K/UL (ref 0–1)
MONOCYTES NFR BLD: 6 % (ref 5–13)
NEUTS SEG # BLD: 4.3 K/UL (ref 1.8–8)
NEUTS SEG NFR BLD: 71 % (ref 32–75)
NRBC # BLD: 0 K/UL (ref 0–0.01)
NRBC BLD-RTO: 0 PER 100 WBC
P-R INTERVAL, ECG05: 134 MS
PLATELET # BLD AUTO: 372 K/UL (ref 150–400)
PMV BLD AUTO: 9.5 FL (ref 8.9–12.9)
POTASSIUM SERPL-SCNC: 3.3 MMOL/L (ref 3.5–5.1)
PROT SERPL-MCNC: 7.1 G/DL (ref 6.4–8.2)
Q-T INTERVAL, ECG07: 358 MS
QRS DURATION, ECG06: 78 MS
QTC CALCULATION (BEZET), ECG08: 454 MS
RBC # BLD AUTO: 4.56 M/UL (ref 3.8–5.2)
SODIUM SERPL-SCNC: 139 MMOL/L (ref 136–145)
TROPONIN-HIGH SENSITIVITY: 7 NG/L (ref 0–51)
VENTRICULAR RATE, ECG03: 97 BPM
WBC # BLD AUTO: 6 K/UL (ref 3.6–11)

## 2022-05-14 PROCEDURE — 99285 EMERGENCY DEPT VISIT HI MDM: CPT

## 2022-05-14 PROCEDURE — 74011250636 HC RX REV CODE- 250/636: Performed by: EMERGENCY MEDICINE

## 2022-05-14 PROCEDURE — 85025 COMPLETE CBC W/AUTO DIFF WBC: CPT

## 2022-05-14 PROCEDURE — 71045 X-RAY EXAM CHEST 1 VIEW: CPT

## 2022-05-14 PROCEDURE — 93005 ELECTROCARDIOGRAM TRACING: CPT

## 2022-05-14 PROCEDURE — 36415 COLL VENOUS BLD VENIPUNCTURE: CPT

## 2022-05-14 PROCEDURE — 80053 COMPREHEN METABOLIC PANEL: CPT

## 2022-05-14 PROCEDURE — 84484 ASSAY OF TROPONIN QUANT: CPT

## 2022-05-14 PROCEDURE — 96375 TX/PRO/DX INJ NEW DRUG ADDON: CPT

## 2022-05-14 PROCEDURE — 96374 THER/PROPH/DIAG INJ IV PUSH: CPT

## 2022-05-14 RX ORDER — ONDANSETRON 4 MG/1
4 TABLET, FILM COATED ORAL
Qty: 20 TABLET | Refills: 0 | Status: SHIPPED | OUTPATIENT
Start: 2022-05-14

## 2022-05-14 RX ORDER — HALOPERIDOL 5 MG/ML
2.5 INJECTION INTRAMUSCULAR ONCE
Status: COMPLETED | OUTPATIENT
Start: 2022-05-14 | End: 2022-05-14

## 2022-05-14 RX ORDER — HYDRALAZINE HYDROCHLORIDE 20 MG/ML
10 INJECTION INTRAMUSCULAR; INTRAVENOUS ONCE
Status: DISCONTINUED | OUTPATIENT
Start: 2022-05-14 | End: 2022-05-14 | Stop reason: HOSPADM

## 2022-05-14 RX ORDER — KETOROLAC TROMETHAMINE 30 MG/ML
30 INJECTION, SOLUTION INTRAMUSCULAR; INTRAVENOUS
Status: COMPLETED | OUTPATIENT
Start: 2022-05-14 | End: 2022-05-14

## 2022-05-14 RX ORDER — METOCLOPRAMIDE HYDROCHLORIDE 5 MG/ML
10 INJECTION INTRAMUSCULAR; INTRAVENOUS
Status: COMPLETED | OUTPATIENT
Start: 2022-05-14 | End: 2022-05-14

## 2022-05-14 RX ADMIN — HALOPERIDOL LACTATE 2.5 MG: 5 INJECTION, SOLUTION INTRAMUSCULAR at 15:47

## 2022-05-14 RX ADMIN — KETOROLAC TROMETHAMINE 30 MG: 30 INJECTION, SOLUTION INTRAMUSCULAR; INTRAVENOUS at 15:51

## 2022-05-14 RX ADMIN — METOCLOPRAMIDE HYDROCHLORIDE 10 MG: 5 INJECTION INTRAMUSCULAR; INTRAVENOUS at 14:22

## 2022-05-14 RX ADMIN — SODIUM CHLORIDE 1000 ML: 9 INJECTION, SOLUTION INTRAVENOUS at 14:22

## 2022-05-14 NOTE — ED PROVIDER NOTES
EMERGENCY DEPARTMENT HISTORY AND PHYSICAL EXAM      Date: 5/14/2022  Patient Name: Charlie Riojas      History of Presenting Illness     Chief Complaint   Patient presents with    Vomiting    Nausea    Chest Pain    Shortness of Breath    Hypertension       History Provided By: Patient    HPI: Charlie Riojas, 39 y.o. female with a past medical history significant diabetes, hypertension and hyperlipidemia presents to the ED with cc of nausea vomiting some chest pain little bit of elevated blood pressure and chest tightness. Is been going on for the past couple of days. She thinks it is because she is dehydrated from vomiting. She denies any fever, chills, rash, diarrhea, headache, night sweats. Symptoms are mild to moderate without exacerbating or relieving factors. There are no other complaints, changes, or physical findings at this time. PCP: Cecilia Valadez MD    Current Facility-Administered Medications   Medication Dose Route Frequency Provider Last Rate Last Admin    hydrALAZINE (APRESOLINE) 20 mg/mL injection 10 mg  10 mg IntraVENous ONCE Ishaan Wallis MD         Current Outpatient Medications   Medication Sig Dispense Refill    ondansetron hcl (Zofran) 4 mg tablet Take 1 Tablet by mouth every eight (8) hours as needed for Nausea. 20 Tablet 0    insulin glargine (LANTUS,BASAGLAR) 100 unit/mL (3 mL) inpn Inject 10 units at bedtime 1 Adjustable Dose Pre-filled Pen Syringe 3    Insulin Needles, Disposable, 31 gauge x 5/16\" ndle Once a day 1 Each 3    glucose blood VI test strips (OneTouch Verio test strips) strip Check glucose twice a day 100 Strip 5    Blood-Glucose Meter (OneTouch Verio IQ Meter) monitoring kit Check glucose twice a day 1 Kit 0    lancets (One Touch Delica) 33 gauge misc Check glucose twice a day 100 Each 5    polyethylene glycol (Miralax) 17 gram/dose powder Take 17 g by mouth daily.  1 tablespoon with 8 oz of water daily 235 g 0    Blood-Glucose Meter (True Metrix Glucose Meter) misc Check glucose once a day 1 Each 0    glucose blood VI test strips (True Metrix Glucose Test Strip) strip Check glucose once a day 50 Strip 5    lancets misc Check glucose once a day 50 Each 5    Blood-Glucose Meter monitoring kit Check once a day 1 Kit 0    lancets misc Check once a day 50 Each 5    butalbital-acetaminophen-caff (Fioricet) -40 mg per capsule Take 1 Cap by mouth every four (4) hours as needed for Headache for up to 10 doses. 10 Cap 0       Past History     Past Medical History:  Past Medical History:   Diagnosis Date    Cholesterol serum increased 5/19/2010    Diabetes mellitus ([address] 5/19/2010    HTN (hypertension), benign 5/19/2010    Vitamin D deficiency 11/16/2020       Past Surgical History:  Past Surgical History:   Procedure Laterality Date    HX GYN      s/p Hysterectomy       Family History:  Family History   Problem Relation Age of Onset    Diabetes Mother     Cancer Father     Alcohol abuse Neg Hx     Arthritis-rheumatoid Neg Hx     Asthma Neg Hx     Bleeding Prob Neg Hx     Elevated Lipids Neg Hx     Headache Neg Hx     Heart Disease Neg Hx     Hypertension Neg Hx     Lung Disease Neg Hx     Migraines Neg Hx     Psychiatric Disorder Neg Hx     Stroke Neg Hx     Mental Retardation Neg Hx        Social History:  Social History     Tobacco Use    Smoking status: Current Every Day Smoker     Packs/day: 1.00    Smokeless tobacco: Never Used   Vaping Use    Vaping Use: Never used   Substance Use Topics    Alcohol use: No    Drug use: No       Allergies:  No Known Allergies      Review of Systems     Review of Systems   Constitutional: Negative. Negative for appetite change, chills, fatigue and fever. HENT: Negative. Negative for congestion and sinus pain. Eyes: Negative. Negative for pain and visual disturbance. Respiratory: Positive for chest tightness and shortness of breath. Cardiovascular: Positive for chest pain. Gastrointestinal: Positive for nausea and vomiting. Negative for abdominal pain and diarrhea. Genitourinary: Negative. Negative for difficulty urinating. No discharge   Musculoskeletal: Negative. Negative for arthralgias. Skin: Negative. Negative for rash. Neurological: Negative. Negative for weakness and headaches. Hematological: Negative. Psychiatric/Behavioral: Negative. Negative for agitation. The patient is not nervous/anxious. All other systems reviewed and are negative. Physical Exam     Physical Exam  Vitals and nursing note reviewed. Constitutional:       General: She is not in acute distress. Appearance: She is well-developed. HENT:      Head: Normocephalic and atraumatic. Nose: Nose normal.      Mouth/Throat:      Mouth: Mucous membranes are moist.      Pharynx: Oropharynx is clear. No oropharyngeal exudate. Eyes:      General:         Right eye: No discharge. Left eye: No discharge. Conjunctiva/sclera: Conjunctivae normal.      Pupils: Pupils are equal, round, and reactive to light. Cardiovascular:      Rate and Rhythm: Normal rate and regular rhythm. Chest Wall: PMI is not displaced. No thrill. Heart sounds: Normal heart sounds. No murmur heard. No friction rub. No gallop. Pulmonary:      Effort: Pulmonary effort is normal. No respiratory distress. Breath sounds: Normal breath sounds. No wheezing or rales. Chest:      Chest wall: No tenderness. Abdominal:      General: Bowel sounds are normal. There is no distension. Palpations: Abdomen is soft. There is no mass. Tenderness: There is no abdominal tenderness. There is no guarding or rebound. Musculoskeletal:         General: Normal range of motion. Cervical back: Normal range of motion and neck supple. Lymphadenopathy:      Cervical: No cervical adenopathy. Skin:     General: Skin is warm and dry.       Capillary Refill: Capillary refill takes less than 2 seconds. Findings: No erythema or rash. Neurological:      Mental Status: She is alert and oriented to person, place, and time. Cranial Nerves: No cranial nerve deficit. Coordination: Coordination normal.   Psychiatric:         Mood and Affect: Mood normal.         Behavior: Behavior normal.         Lab and Diagnostic Study Results     Labs -     Recent Results (from the past 12 hour(s))   CBC WITH AUTOMATED DIFF    Collection Time: 05/14/22  1:44 PM   Result Value Ref Range    WBC 6.0 3.6 - 11.0 K/uL    RBC 4.56 3.80 - 5.20 M/uL    HGB 13.9 11.5 - 16.0 g/dL    HCT 39.0 35.0 - 47.0 %    MCV 85.5 80.0 - 99.0 FL    MCH 30.5 26.0 - 34.0 PG    MCHC 35.6 30.0 - 36.5 g/dL    RDW 12.4 11.5 - 14.5 %    PLATELET 881 409 - 638 K/uL    MPV 9.5 8.9 - 12.9 FL    NRBC 0.0 0.0  WBC    ABSOLUTE NRBC 0.00 0.00 - 0.01 K/uL    NEUTROPHILS 71 32 - 75 %    LYMPHOCYTES 22 12 - 49 %    MONOCYTES 6 5 - 13 %    EOSINOPHILS 0 0 - 7 %    BASOPHILS 1 0 - 1 %    IMMATURE GRANULOCYTES 0 0 - 0.5 %    ABS. NEUTROPHILS 4.3 1.8 - 8.0 K/UL    ABS. LYMPHOCYTES 1.4 0.8 - 3.5 K/UL    ABS. MONOCYTES 0.4 0.0 - 1.0 K/UL    ABS. EOSINOPHILS 0.0 0.0 - 0.4 K/UL    ABS. BASOPHILS 0.0 0.0 - 0.1 K/UL    ABS. IMM. GRANS. 0.0 0.00 - 0.04 K/UL    DF AUTOMATED     METABOLIC PANEL, COMPREHENSIVE    Collection Time: 05/14/22  1:44 PM   Result Value Ref Range    Sodium 139 136 - 145 mmol/L    Potassium 3.3 (L) 3.5 - 5.1 mmol/L    Chloride 103 97 - 108 mmol/L    CO2 29 21 - 32 mmol/L    Anion gap 7 5 - 15 mmol/L    Glucose 173 (H) 65 - 100 mg/dL    BUN 11 6 - 20 mg/dL    Creatinine 0.59 0.55 - 1.02 mg/dL    BUN/Creatinine ratio 19 12 - 20      GFR est AA >60 >60 ml/min/1.73m2    GFR est non-AA >60 >60 ml/min/1.73m2    Calcium 8.7 8.5 - 10.1 mg/dL    Bilirubin, total 0.6 0.2 - 1.0 mg/dL    AST (SGOT) 14 (L) 15 - 37 U/L    ALT (SGPT) 18 12 - 78 U/L    Alk.  phosphatase 54 45 - 117 U/L    Protein, total 7.1 6.4 - 8.2 g/dL    Albumin 3.8 3.5 - 5.0 g/dL    Globulin 3.3 2.0 - 4.0 g/dL    A-G Ratio 1.2 1.1 - 2.2     TROPONIN-HIGH SENSITIVITY    Collection Time: 05/14/22  1:44 PM   Result Value Ref Range    Troponin-High Sensitivity 7 0 - 51 ng/L       Radiologic Studies -   [unfilled]  CT Results  (Last 48 hours)               05/13/22 1208  CT ABD PELV W CONT Final result    Impression:  No bowel obstruction. No CT evidence for appendicitis or   diverticulitis. Nonspecific 1.5 cm right ovarian cyst.       Narrative:  CT abdomen and pelvis with IV contrast.       Axial images are reviewed along with reformatted sagittal/coronal images. 100 mL   Isovue 370 administered. Dose reduction: All CT scans at this facility are performed using dose reduction   optimization techniques as appropriate to a performed exam including the   following-   automated exposure control, adjustments of mA and/or Kv according to patient   size, or use of iterative reconstructive technique. Lung bases are clear. Normal enhancement for the liver. Gallbladder nondistended. Pancreas, spleen,   and bilateral adrenal glands appear unremarkable. Normal enhancement bilateral   kidneys. No hydronephrosis. Stomach and small bowel loops are decompressed. Appendix unremarkable. Stool and   air through colon. No CT evidence for appendicitis or diverticulitis. No   ascites. Nonspecific 1.5 cm right ovarian cyst.        Normal caliber abdominal aorta. CXR Results  (Last 48 hours)               05/14/22 1359  XR CHEST PORT Final result    Impression:  Normal findings. Narrative:  History: Shortness of breath       A single portable view of  the chest was obtained. Heart size and vascularity   are within normal limits. Lungs are clear. Bony structures are within normal   limits. 05/13/22 1052  XR CHEST PORT Final result    Impression:  No acute pulmonary process.            Narrative:  Chest, frontal view, 5/13/2022 History: Chest pain. Comparison: Including chest 3/6/2022. Findings: The cardiac silhouette is within normal limits. The lungs are   adequately expanded. No hydrostatic edema is present. No focal consolidation,   pleural effusions or pneumothorax is identified. The osseous structures appear   intact. Medical Decision Making and ED Course   - I am the first and primary provider for this patient AND AM THE PRIMARY PROVIDER OF RECORD. - I reviewed the vital signs, available nursing notes, past medical history, past surgical history, family history and social history. - Initial assessment performed. The patients presenting problems have been discussed, and the staff are in agreement with the care plan formulated and outlined with them. I have encouraged them to ask questions as they arise throughout their visit. Vital Signs-Reviewed the patient's vital signs. Patient Vitals for the past 12 hrs:   Temp Pulse Resp BP SpO2   05/14/22 1545 -- -- -- (!) 191/94 --   05/14/22 1515 -- -- -- (!) 178/98 --   05/14/22 1501 -- 100 16 (!) 188/116 100 %   05/14/22 1425 -- 92 18 (!) 191/108 100 %   05/14/22 1337 99.3 °F (37.4 °C) 96 11 (!) 205/124 100 %       Will assess with basic cardiac labs EKG chest x-ray. ED Course:       ED Course as of 05/14/22 1558   Sat May 14, 2022   1537 Patient now admits to smoking marijuana. Will treat with Haldol for hyperemesis cannabis. [CS]      ED Course User Index  [CS] Arun Boyd MD         Provider Notes (Medical Decision Making):   Patient is feeling slightly better and her blood pressure is now improved. We will encourage her to discontinue substance abuse at this time will provide discharge instructions to the effect.   We will also provide her some medication for symptom  MDM           Consultations:       Consultations: - NONE        Procedures and Critical Care       Performed by: Deshaun Smalls MD  PROCEDURES:  Procedures Disposition     Disposition: Condition stable    Discharged    Remove if not discharged  DISCHARGE PLAN:  1. Current Discharge Medication List      CONTINUE these medications which have NOT CHANGED    Details   ondansetron (ZOFRAN ODT) 4 mg disintegrating tablet Take 1 Tablet by mouth every eight (8) hours as needed for Nausea or Vomiting. Qty: 10 Tablet, Refills: 0      ondansetron hcl (Zofran) 4 mg tablet Take 1 Tablet by mouth every eight (8) hours as needed for Nausea or Vomiting. Qty: 20 Tablet, Refills: 0      insulin glargine (LANTUS,BASAGLAR) 100 unit/mL (3 mL) inpn Inject 10 units at bedtime  Qty: 1 Adjustable Dose Pre-filled Pen Syringe, Refills: 3    Associated Diagnoses: Type 2 diabetes mellitus with hyperglycemia, with long-term current use of insulin ([facility])      Insulin Needles, Disposable, 31 gauge x 5/16\" ndle Once a day  Qty: 1 Each, Refills: 3    Associated Diagnoses: Type 2 diabetes mellitus with hyperglycemia, with long-term current use of insulin ([address]      ! ! glucose blood VI test strips (OneTouch Verio test strips) strip Check glucose twice a day  Qty: 100 Strip, Refills: 5    Associated Diagnoses: Type 2 diabetes mellitus with hyperglycemia, with long-term current use of insulin ([address]      ! ! Blood-Glucose Meter (OneTouch Verio IQ Meter) monitoring kit Check glucose twice a day  Qty: 1 Kit, Refills: 0    Associated Diagnoses: Type 2 diabetes mellitus with hyperglycemia, with long-term current use of insulin ([facility])      lancets (One Touch Delica) 33 gauge misc Check glucose twice a day  Qty: 100 Each, Refills: 5    Associated Diagnoses: Type 2 diabetes mellitus with hyperglycemia, with long-term current use of insulin ([facility])      polyethylene glycol (Miralax) 17 gram/dose powder Take 17 g by mouth daily.  1 tablespoon with 8 oz of water daily  Qty: 235 g, Refills: 0      Blood-Glucose Meter (True Metrix Glucose Meter) misc Check glucose once a day  Qty: 1 Each, Refills: 0    Associated Diagnoses: Type 2 diabetes mellitus with hyperglycemia, without long-term current use of insulin ([address]      ! ! glucose blood VI test strips (True Metrix Glucose Test Strip) strip Check glucose once a day  Qty: 50 Strip, Refills: 5    Associated Diagnoses: Type 2 diabetes mellitus with hyperglycemia, without long-term current use of insulin ([address]      ! ! lancets misc Check glucose once a day  Qty: 50 Each, Refills: 5    Associated Diagnoses: Type 2 diabetes mellitus with hyperglycemia, without long-term current use of insulin ([address]      ! ! Blood-Glucose Meter monitoring kit Check once a day  Qty: 1 Kit, Refills: 0    Associated Diagnoses: Type 2 diabetes mellitus with hyperglycemia, without long-term current use of insulin ([address]      ! ! lancets misc Check once a day  Qty: 50 Each, Refills: 5    Associated Diagnoses: Type 2 diabetes mellitus with hyperglycemia, without long-term current use of insulin ([facility])      butalbital-acetaminophen-caff (Fioricet) -40 mg per capsule Take 1 Cap by mouth every four (4) hours as needed for Headache for up to 10 doses. Qty: 10 Cap, Refills: 0       !! - Potential duplicate medications found. Please discuss with provider. 2.   Follow-up Information     Follow up With Specialties Details Why [facility] EMERGENCY DEPT Emergency Medicine Call in 2 days  [address] [id number]  [phone]        3. Return to ED if worse   4. Current Discharge Medication List      START taking these medications    Details   ondansetron hcl (Zofran) 4 mg tablet Take 1 Tablet by mouth every eight (8) hours as needed for Nausea. Qty: 20 Tablet, Refills: 0  Start date: 5/14/2022             Diagnosis     Clinical Impression:   1. Cannabis hyperemesis syndrome concurrent with and due to cannabis abuse [facility])        Attestations:    Faustino Quiroga MD    Please note that this dictation was completed with [employer], the [employer] voice recognition software. Quite often unanticipated grammatical, syntax, homophones, and other interpretive errors are inadvertently transcribed by the computer software. Please disregard these errors. Please excuse any errors that have escaped final proofreading. Thank you.

## 2023-05-25 RX ORDER — INSULIN GLARGINE 100 [IU]/ML
INJECTION, SOLUTION SUBCUTANEOUS
COMMUNITY
Start: 2021-11-10

## 2023-05-25 RX ORDER — ONDANSETRON 4 MG/1
4 TABLET, FILM COATED ORAL EVERY 8 HOURS PRN
COMMUNITY
Start: 2022-05-14

## 2023-05-25 RX ORDER — BUTALBITAL, ACETAMINOPHEN AND CAFFEINE 300; 40; 50 MG/1; MG/1; MG/1
1 CAPSULE ORAL EVERY 4 HOURS PRN
COMMUNITY
Start: 2021-02-16

## 2023-05-25 RX ORDER — POLYETHYLENE GLYCOL 3350 17 G/17G
17 POWDER, FOR SOLUTION ORAL DAILY
COMMUNITY
Start: 2021-05-11

## 2023-05-25 RX ORDER — LANCETS 30 GAUGE
EACH MISCELLANEOUS
COMMUNITY
Start: 2021-03-09